# Patient Record
Sex: MALE | Race: WHITE | Employment: OTHER | URBAN - NONMETROPOLITAN AREA
[De-identification: names, ages, dates, MRNs, and addresses within clinical notes are randomized per-mention and may not be internally consistent; named-entity substitution may affect disease eponyms.]

---

## 2017-01-09 ENCOUNTER — HOSPITAL ENCOUNTER (OUTPATIENT)
Dept: CT IMAGING | Facility: MEDICAL CENTER | Age: 77
Discharge: HOME | End: 2017-01-09
Attending: INTERNAL MEDICINE | Admitting: INTERNAL MEDICINE

## 2017-01-09 DIAGNOSIS — R91.1 SOLITARY PULMONARY NODULE: ICD-10-CM

## 2017-01-09 DIAGNOSIS — R91.1 PULMONARY NODULE: ICD-10-CM

## 2017-01-11 ENCOUNTER — OFFICE VISIT (OUTPATIENT)
Dept: PULMONOLOGY | Age: 77
End: 2017-01-11

## 2017-01-11 VITALS
WEIGHT: 211 LBS | SYSTOLIC BLOOD PRESSURE: 124 MMHG | HEIGHT: 67 IN | RESPIRATION RATE: 18 BRPM | BODY MASS INDEX: 33.12 KG/M2 | OXYGEN SATURATION: 94 % | HEART RATE: 74 BPM | TEMPERATURE: 97 F | DIASTOLIC BLOOD PRESSURE: 62 MMHG

## 2017-01-11 DIAGNOSIS — R09.82 PND (POST-NASAL DRIP): ICD-10-CM

## 2017-01-11 DIAGNOSIS — R91.1 PULMONARY NODULE: Primary | ICD-10-CM

## 2017-01-11 DIAGNOSIS — J44.9 COPD, SEVERE (HCC): ICD-10-CM

## 2017-01-11 PROCEDURE — 99214 OFFICE O/P EST MOD 30 MIN: CPT | Performed by: INTERNAL MEDICINE

## 2017-01-11 RX ORDER — FLUTICASONE PROPIONATE 50 MCG
2 SPRAY, SUSPENSION (ML) NASAL DAILY
Qty: 1 BOTTLE | Refills: 11 | Status: SHIPPED | OUTPATIENT
Start: 2017-01-11 | End: 2017-05-24 | Stop reason: SDUPTHER

## 2017-01-27 ENCOUNTER — TELEPHONE (OUTPATIENT)
Dept: FAMILY MEDICINE CLINIC | Age: 77
End: 2017-01-27

## 2017-02-09 DIAGNOSIS — J43.1 PANLOBULAR EMPHYSEMA (HCC): ICD-10-CM

## 2017-02-10 RX ORDER — FLUTICASONE FUROATE AND VILANTEROL 100; 25 UG/1; UG/1
1 POWDER RESPIRATORY (INHALATION) DAILY
Qty: 30 EACH | Refills: 8 | Status: CANCELLED | OUTPATIENT
Start: 2017-02-10

## 2017-03-13 ENCOUNTER — OFFICE VISIT (OUTPATIENT)
Dept: FAMILY MEDICINE CLINIC | Age: 77
End: 2017-03-13

## 2017-03-13 VITALS
DIASTOLIC BLOOD PRESSURE: 62 MMHG | OXYGEN SATURATION: 92 % | WEIGHT: 215 LBS | HEART RATE: 61 BPM | BODY MASS INDEX: 33.74 KG/M2 | SYSTOLIC BLOOD PRESSURE: 142 MMHG | HEIGHT: 67 IN

## 2017-03-13 DIAGNOSIS — M54.2 NECK PAIN: Primary | ICD-10-CM

## 2017-03-13 DIAGNOSIS — J44.1 COPD WITH ACUTE EXACERBATION (HCC): ICD-10-CM

## 2017-03-13 PROCEDURE — 3023F SPIROM DOC REV: CPT | Performed by: FAMILY MEDICINE

## 2017-03-13 PROCEDURE — 3017F COLORECTAL CA SCREEN DOC REV: CPT | Performed by: FAMILY MEDICINE

## 2017-03-13 PROCEDURE — 4040F PNEUMOC VAC/ADMIN/RCVD: CPT | Performed by: FAMILY MEDICINE

## 2017-03-13 PROCEDURE — G8484 FLU IMMUNIZE NO ADMIN: HCPCS | Performed by: FAMILY MEDICINE

## 2017-03-13 PROCEDURE — G8417 CALC BMI ABV UP PARAM F/U: HCPCS | Performed by: FAMILY MEDICINE

## 2017-03-13 PROCEDURE — 1123F ACP DISCUSS/DSCN MKR DOCD: CPT | Performed by: FAMILY MEDICINE

## 2017-03-13 PROCEDURE — 99214 OFFICE O/P EST MOD 30 MIN: CPT | Performed by: FAMILY MEDICINE

## 2017-03-13 PROCEDURE — G8926 SPIRO NO PERF OR DOC: HCPCS | Performed by: FAMILY MEDICINE

## 2017-03-13 PROCEDURE — G8427 DOCREV CUR MEDS BY ELIG CLIN: HCPCS | Performed by: FAMILY MEDICINE

## 2017-03-13 PROCEDURE — 1036F TOBACCO NON-USER: CPT | Performed by: FAMILY MEDICINE

## 2017-03-13 RX ORDER — PREDNISONE 10 MG/1
20 TABLET ORAL 2 TIMES DAILY
Qty: 20 TABLET | Refills: 0 | Status: SHIPPED | OUTPATIENT
Start: 2017-03-13 | End: 2017-03-18

## 2017-03-13 RX ORDER — FLUTICASONE FUROATE AND VILANTEROL 100; 25 UG/1; UG/1
1 POWDER RESPIRATORY (INHALATION) DAILY
Qty: 2 EACH | Refills: 0 | COMMUNITY
Start: 2017-03-13 | End: 2017-05-24 | Stop reason: SDUPTHER

## 2017-03-13 RX ORDER — HYDROCODONE BITARTRATE AND ACETAMINOPHEN 5; 325 MG/1; MG/1
1 TABLET ORAL EVERY 6 HOURS PRN
Qty: 30 TABLET | Refills: 0 | Status: SHIPPED | OUTPATIENT
Start: 2017-03-13 | End: 2017-03-20

## 2017-03-13 ASSESSMENT — ENCOUNTER SYMPTOMS
WHEEZING: 1
COUGH: 1
SHORTNESS OF BREATH: 1

## 2017-05-24 ENCOUNTER — OFFICE VISIT (OUTPATIENT)
Dept: FAMILY MEDICINE CLINIC | Age: 77
End: 2017-05-24

## 2017-05-24 VITALS
BODY MASS INDEX: 34.37 KG/M2 | WEIGHT: 219 LBS | SYSTOLIC BLOOD PRESSURE: 134 MMHG | HEART RATE: 63 BPM | OXYGEN SATURATION: 94 % | HEIGHT: 67 IN | TEMPERATURE: 97.9 F | RESPIRATION RATE: 20 BRPM | DIASTOLIC BLOOD PRESSURE: 62 MMHG

## 2017-05-24 DIAGNOSIS — E78.5 HYPERLIPIDEMIA WITH TARGET LDL LESS THAN 130: ICD-10-CM

## 2017-05-24 DIAGNOSIS — I10 ESSENTIAL HYPERTENSION: ICD-10-CM

## 2017-05-24 DIAGNOSIS — J43.1 PANLOBULAR EMPHYSEMA (HCC): ICD-10-CM

## 2017-05-24 DIAGNOSIS — N40.1 BENIGN PROSTATIC HYPERPLASIA WITH LOWER URINARY TRACT SYMPTOMS, UNSPECIFIED MORPHOLOGY: ICD-10-CM

## 2017-05-24 DIAGNOSIS — F32.89 OTHER DEPRESSION: ICD-10-CM

## 2017-05-24 PROCEDURE — G8417 CALC BMI ABV UP PARAM F/U: HCPCS | Performed by: FAMILY MEDICINE

## 2017-05-24 PROCEDURE — G8926 SPIRO NO PERF OR DOC: HCPCS | Performed by: FAMILY MEDICINE

## 2017-05-24 PROCEDURE — G8427 DOCREV CUR MEDS BY ELIG CLIN: HCPCS | Performed by: FAMILY MEDICINE

## 2017-05-24 PROCEDURE — 4040F PNEUMOC VAC/ADMIN/RCVD: CPT | Performed by: FAMILY MEDICINE

## 2017-05-24 PROCEDURE — 1123F ACP DISCUSS/DSCN MKR DOCD: CPT | Performed by: FAMILY MEDICINE

## 2017-05-24 PROCEDURE — 3023F SPIROM DOC REV: CPT | Performed by: FAMILY MEDICINE

## 2017-05-24 PROCEDURE — 1036F TOBACCO NON-USER: CPT | Performed by: FAMILY MEDICINE

## 2017-05-24 PROCEDURE — 99214 OFFICE O/P EST MOD 30 MIN: CPT | Performed by: FAMILY MEDICINE

## 2017-05-24 PROCEDURE — 3017F COLORECTAL CA SCREEN DOC REV: CPT | Performed by: FAMILY MEDICINE

## 2017-05-24 RX ORDER — FLUOXETINE HYDROCHLORIDE 20 MG/1
CAPSULE ORAL
Qty: 30 CAPSULE | Refills: 5 | Status: SHIPPED | OUTPATIENT
Start: 2017-05-24 | End: 2017-11-10 | Stop reason: SDUPTHER

## 2017-05-24 RX ORDER — FLUTICASONE FUROATE AND VILANTEROL 100; 25 UG/1; UG/1
1 POWDER RESPIRATORY (INHALATION) DAILY
Qty: 30 EACH | Refills: 8 | Status: SHIPPED | OUTPATIENT
Start: 2017-05-24 | End: 2018-02-19 | Stop reason: SDUPTHER

## 2017-05-24 RX ORDER — ALBUTEROL SULFATE 90 UG/1
2 AEROSOL, METERED RESPIRATORY (INHALATION) EVERY 6 HOURS PRN
Qty: 3 INHALER | Refills: 5 | Status: SHIPPED | OUTPATIENT
Start: 2017-05-24 | End: 2018-01-11 | Stop reason: SDUPTHER

## 2017-05-24 RX ORDER — HYDROCHLOROTHIAZIDE 25 MG/1
25 TABLET ORAL DAILY
Qty: 30 TABLET | Refills: 5 | Status: SHIPPED | OUTPATIENT
Start: 2017-05-24 | End: 2017-11-10 | Stop reason: SDUPTHER

## 2017-05-24 RX ORDER — SIMVASTATIN 10 MG
TABLET ORAL
Qty: 30 TABLET | Refills: 5 | Status: SHIPPED | OUTPATIENT
Start: 2017-05-24 | End: 2017-11-10 | Stop reason: SDUPTHER

## 2017-05-24 RX ORDER — TAMSULOSIN HYDROCHLORIDE 0.4 MG/1
CAPSULE ORAL
Qty: 60 CAPSULE | Refills: 5 | Status: SHIPPED | OUTPATIENT
Start: 2017-05-24 | End: 2017-11-10 | Stop reason: SDUPTHER

## 2017-05-24 RX ORDER — ZOLPIDEM TARTRATE 5 MG/1
TABLET ORAL
Qty: 15 TABLET | Refills: 5 | Status: ON HOLD | OUTPATIENT
Start: 2017-05-24 | End: 2018-04-03 | Stop reason: HOSPADM

## 2017-05-24 RX ORDER — FLUTICASONE PROPIONATE 50 MCG
2 SPRAY, SUSPENSION (ML) NASAL DAILY
Qty: 1 BOTTLE | Refills: 11 | Status: SHIPPED | OUTPATIENT
Start: 2017-05-24 | End: 2018-08-13 | Stop reason: SDUPTHER

## 2017-05-24 RX ORDER — LISINOPRIL 20 MG/1
TABLET ORAL
Qty: 30 TABLET | Refills: 5 | Status: SHIPPED | OUTPATIENT
Start: 2017-05-24 | End: 2017-07-28 | Stop reason: SDUPTHER

## 2017-05-24 ASSESSMENT — PATIENT HEALTH QUESTIONNAIRE - PHQ9
2. FEELING DOWN, DEPRESSED OR HOPELESS: 0
SUM OF ALL RESPONSES TO PHQ QUESTIONS 1-9: 0
1. LITTLE INTEREST OR PLEASURE IN DOING THINGS: 0
SUM OF ALL RESPONSES TO PHQ9 QUESTIONS 1 & 2: 0

## 2017-05-24 ASSESSMENT — ENCOUNTER SYMPTOMS
GASTROINTESTINAL NEGATIVE: 1
EYES NEGATIVE: 1

## 2017-07-28 DIAGNOSIS — I10 ESSENTIAL HYPERTENSION: ICD-10-CM

## 2017-07-28 RX ORDER — LISINOPRIL 20 MG/1
TABLET ORAL
Qty: 30 TABLET | Refills: 4 | Status: SHIPPED | OUTPATIENT
Start: 2017-07-28 | End: 2017-11-10 | Stop reason: SDUPTHER

## 2017-11-10 ENCOUNTER — OFFICE VISIT (OUTPATIENT)
Dept: FAMILY MEDICINE CLINIC | Age: 77
End: 2017-11-10

## 2017-11-10 VITALS
DIASTOLIC BLOOD PRESSURE: 60 MMHG | HEIGHT: 67 IN | BODY MASS INDEX: 34.69 KG/M2 | OXYGEN SATURATION: 95 % | WEIGHT: 221 LBS | SYSTOLIC BLOOD PRESSURE: 116 MMHG | HEART RATE: 62 BPM

## 2017-11-10 DIAGNOSIS — F32.89 OTHER DEPRESSION: ICD-10-CM

## 2017-11-10 DIAGNOSIS — I10 ESSENTIAL HYPERTENSION: Primary | ICD-10-CM

## 2017-11-10 DIAGNOSIS — C34.32 MALIGNANT NEOPLASM OF LOWER LOBE OF LEFT LUNG (HCC): ICD-10-CM

## 2017-11-10 DIAGNOSIS — E78.5 HYPERLIPIDEMIA WITH TARGET LDL LESS THAN 130: ICD-10-CM

## 2017-11-10 LAB
A/G RATIO: 1.6 (ref 1.1–2.2)
ALBUMIN SERPL-MCNC: 4.4 G/DL (ref 3.4–5)
ALP BLD-CCNC: 58 U/L (ref 40–129)
ALT SERPL-CCNC: 23 U/L (ref 10–40)
ANION GAP SERPL CALCULATED.3IONS-SCNC: 12 MMOL/L (ref 3–16)
AST SERPL-CCNC: 22 U/L (ref 15–37)
BILIRUB SERPL-MCNC: 0.5 MG/DL (ref 0–1)
BUN BLDV-MCNC: 18 MG/DL (ref 7–20)
CALCIUM SERPL-MCNC: 10.7 MG/DL (ref 8.3–10.6)
CHLORIDE BLD-SCNC: 96 MMOL/L (ref 99–110)
CHOLESTEROL, TOTAL: 162 MG/DL (ref 0–199)
CO2: 33 MMOL/L (ref 21–32)
CREAT SERPL-MCNC: 0.7 MG/DL (ref 0.8–1.3)
GFR AFRICAN AMERICAN: >60
GFR NON-AFRICAN AMERICAN: >60
GLOBULIN: 2.7 G/DL
GLUCOSE BLD-MCNC: 97 MG/DL (ref 70–99)
HDLC SERPL-MCNC: 64 MG/DL (ref 40–60)
LDL CHOLESTEROL CALCULATED: 82 MG/DL
POTASSIUM SERPL-SCNC: 4.1 MMOL/L (ref 3.5–5.1)
SODIUM BLD-SCNC: 141 MMOL/L (ref 136–145)
TOTAL PROTEIN: 7.1 G/DL (ref 6.4–8.2)
TRIGL SERPL-MCNC: 78 MG/DL (ref 0–150)
VLDLC SERPL CALC-MCNC: 16 MG/DL

## 2017-11-10 PROCEDURE — G8427 DOCREV CUR MEDS BY ELIG CLIN: HCPCS | Performed by: FAMILY MEDICINE

## 2017-11-10 PROCEDURE — 4040F PNEUMOC VAC/ADMIN/RCVD: CPT | Performed by: FAMILY MEDICINE

## 2017-11-10 PROCEDURE — 1123F ACP DISCUSS/DSCN MKR DOCD: CPT | Performed by: FAMILY MEDICINE

## 2017-11-10 PROCEDURE — 3017F COLORECTAL CA SCREEN DOC REV: CPT | Performed by: FAMILY MEDICINE

## 2017-11-10 PROCEDURE — G8484 FLU IMMUNIZE NO ADMIN: HCPCS | Performed by: FAMILY MEDICINE

## 2017-11-10 PROCEDURE — 1036F TOBACCO NON-USER: CPT | Performed by: FAMILY MEDICINE

## 2017-11-10 PROCEDURE — 99214 OFFICE O/P EST MOD 30 MIN: CPT | Performed by: FAMILY MEDICINE

## 2017-11-10 PROCEDURE — G8417 CALC BMI ABV UP PARAM F/U: HCPCS | Performed by: FAMILY MEDICINE

## 2017-11-10 PROCEDURE — 36415 COLL VENOUS BLD VENIPUNCTURE: CPT | Performed by: FAMILY MEDICINE

## 2017-11-10 RX ORDER — TAMSULOSIN HYDROCHLORIDE 0.4 MG/1
CAPSULE ORAL
Qty: 60 CAPSULE | Refills: 5 | Status: SHIPPED | OUTPATIENT
Start: 2017-11-10 | End: 2018-02-27 | Stop reason: DRUGHIGH

## 2017-11-10 RX ORDER — HYDROCHLOROTHIAZIDE 25 MG/1
25 TABLET ORAL DAILY
Qty: 30 TABLET | Refills: 5 | Status: SHIPPED | OUTPATIENT
Start: 2017-11-10 | End: 2018-06-01 | Stop reason: ALTCHOICE

## 2017-11-10 RX ORDER — FINASTERIDE 5 MG/1
5 TABLET, FILM COATED ORAL DAILY
Status: ON HOLD | COMMUNITY
Start: 2017-08-22 | End: 2018-04-03 | Stop reason: HOSPADM

## 2017-11-10 RX ORDER — FLUOXETINE HYDROCHLORIDE 20 MG/1
CAPSULE ORAL
Qty: 30 CAPSULE | Refills: 5 | Status: ON HOLD | OUTPATIENT
Start: 2017-11-10 | End: 2018-04-03 | Stop reason: HOSPADM

## 2017-11-10 RX ORDER — SIMVASTATIN 10 MG
TABLET ORAL
Qty: 30 TABLET | Refills: 5 | Status: SHIPPED | OUTPATIENT
Start: 2017-11-10 | End: 2018-10-01 | Stop reason: SDUPTHER

## 2017-11-10 RX ORDER — FLUTICASONE FUROATE AND VILANTEROL 100; 25 UG/1; UG/1
POWDER RESPIRATORY (INHALATION)
Qty: 1 EACH | Refills: 0 | COMMUNITY
Start: 2017-11-10 | End: 2018-02-19 | Stop reason: SDUPTHER

## 2017-11-10 RX ORDER — LISINOPRIL 20 MG/1
TABLET ORAL
Qty: 30 TABLET | Refills: 5 | Status: SHIPPED | OUTPATIENT
Start: 2017-11-10 | End: 2018-06-01 | Stop reason: DRUGHIGH

## 2017-11-10 NOTE — PROGRESS NOTES
TABLET DAILY  -     Comprehensive Metabolic Panel    Other depression  -     FLUoxetine (PROZAC) 20 MG capsule; TAKE 1 CAPSULE ONCE DAILY    Hyperlipidemia with target LDL less than 130  -     simvastatin (ZOCOR) 10 MG tablet; TAKE ONE TABLET BY MOUTH EVERY EVENING  -     Lipid Panel  -     Comprehensive Metabolic Panel    Malignant neoplasm of lower lobe of left lung (HCC)    Other orders  -     hydrochlorothiazide (HYDRODIURIL) 25 MG tablet; Take 1 tablet by mouth daily  -     tamsulosin (FLOMAX) 0.4 MG capsule; TAKE 2 CAPSULES AT BEDTIME  -     Umeclidinium Bromide (INCRUSE ELLIPTA) 62.5 MCG/INH AEPB; LOT [id number]  EXP 08/2018  2 samples were given  -     fluticasone-vilanterol (BREO ELLIPTA) 100-25 MCG/INH AEPB inhaler; LOT [id number]  EXP 11/2018  2 samples were given           Plan:      Return in about 6 months (around 5/10/2018). There are no Patient Instructions on file for this visit.

## 2017-11-10 NOTE — LETTER
[place]  [facility] [address] [place] [address]  Phone: [phone]  Fax: [phone]    Светлана Singleton MD        November 10, 2017     Patient: Lizbeth Gamez   YOB: 1940   Date of Visit: 11/10/2017       To Whom It May Concern: It is my medical opinion that Ines Lan requires a disability parking placard for the following reasons:  He cannot walk 200 feet without stopping to rest.  Duration of need: permanent    If you have any questions or concerns, please don't hesitate to call.     Sincerely,          Светлана Singleton MD

## 2018-01-02 RX ORDER — FLUTICASONE FUROATE AND VILANTEROL TRIFENATATE 100; 25 UG/1; UG/1
POWDER RESPIRATORY (INHALATION)
Qty: 1 EACH | Refills: 5 | Status: SHIPPED | OUTPATIENT
Start: 2018-01-02 | End: 2018-10-01 | Stop reason: SDUPTHER

## 2018-01-09 ENCOUNTER — HOSPITAL ENCOUNTER (OUTPATIENT)
Dept: CT IMAGING | Facility: MEDICAL CENTER | Age: 78
Discharge: HOME | End: 2018-01-09
Attending: INTERNAL MEDICINE | Admitting: INTERNAL MEDICINE

## 2018-01-09 DIAGNOSIS — R91.1 PULMONARY NODULE: ICD-10-CM

## 2018-01-09 DIAGNOSIS — C80.1 PRIMARY MALIGNANT NEOPLASM (HCC): ICD-10-CM

## 2018-01-11 ENCOUNTER — OFFICE VISIT (OUTPATIENT)
Dept: PULMONOLOGY | Age: 78
End: 2018-01-11

## 2018-01-11 VITALS
OXYGEN SATURATION: 91 % | RESPIRATION RATE: 18 BRPM | WEIGHT: 226 LBS | HEIGHT: 67 IN | BODY MASS INDEX: 35.47 KG/M2 | HEART RATE: 72 BPM | SYSTOLIC BLOOD PRESSURE: 122 MMHG | DIASTOLIC BLOOD PRESSURE: 76 MMHG | TEMPERATURE: 97.8 F

## 2018-01-11 DIAGNOSIS — J44.9 STAGE 3 SEVERE COPD BY GOLD CLASSIFICATION (HCC): ICD-10-CM

## 2018-01-11 DIAGNOSIS — R91.1 PULMONARY NODULE: Primary | ICD-10-CM

## 2018-01-11 PROCEDURE — G8484 FLU IMMUNIZE NO ADMIN: HCPCS | Performed by: INTERNAL MEDICINE

## 2018-01-11 PROCEDURE — 3017F COLORECTAL CA SCREEN DOC REV: CPT | Performed by: INTERNAL MEDICINE

## 2018-01-11 PROCEDURE — 1123F ACP DISCUSS/DSCN MKR DOCD: CPT | Performed by: INTERNAL MEDICINE

## 2018-01-11 PROCEDURE — G8417 CALC BMI ABV UP PARAM F/U: HCPCS | Performed by: INTERNAL MEDICINE

## 2018-01-11 PROCEDURE — 4040F PNEUMOC VAC/ADMIN/RCVD: CPT | Performed by: INTERNAL MEDICINE

## 2018-01-11 PROCEDURE — 3023F SPIROM DOC REV: CPT | Performed by: INTERNAL MEDICINE

## 2018-01-11 PROCEDURE — 1036F TOBACCO NON-USER: CPT | Performed by: INTERNAL MEDICINE

## 2018-01-11 PROCEDURE — G8926 SPIRO NO PERF OR DOC: HCPCS | Performed by: INTERNAL MEDICINE

## 2018-01-11 PROCEDURE — 99214 OFFICE O/P EST MOD 30 MIN: CPT | Performed by: INTERNAL MEDICINE

## 2018-01-11 PROCEDURE — G8427 DOCREV CUR MEDS BY ELIG CLIN: HCPCS | Performed by: INTERNAL MEDICINE

## 2018-01-11 RX ORDER — ALBUTEROL SULFATE 90 UG/1
2 AEROSOL, METERED RESPIRATORY (INHALATION) EVERY 6 HOURS PRN
Qty: 1 INHALER | Refills: 5 | Status: SHIPPED | OUTPATIENT
Start: 2018-01-11 | End: 2019-02-21 | Stop reason: SDUPTHER

## 2018-01-11 NOTE — PATIENT INSTRUCTIONS
Please keep all of your future appointments scheduled by [facility] Naren MCCLELLAN [facility] Pulmonary office.

## 2018-02-19 ENCOUNTER — OFFICE VISIT (OUTPATIENT)
Dept: FAMILY MEDICINE CLINIC | Age: 78
End: 2018-02-19

## 2018-02-19 VITALS
BODY MASS INDEX: 35.4 KG/M2 | TEMPERATURE: 97.7 F | HEART RATE: 59 BPM | SYSTOLIC BLOOD PRESSURE: 129 MMHG | WEIGHT: 226 LBS | OXYGEN SATURATION: 94 % | DIASTOLIC BLOOD PRESSURE: 66 MMHG

## 2018-02-19 DIAGNOSIS — K59.00 CONSTIPATION, UNSPECIFIED CONSTIPATION TYPE: ICD-10-CM

## 2018-02-19 DIAGNOSIS — R10.30 LOWER ABDOMINAL PAIN: Primary | ICD-10-CM

## 2018-02-19 LAB
ALBUMIN SERPL-MCNC: 4.2 G/DL (ref 3.4–5)
ANION GAP SERPL CALCULATED.3IONS-SCNC: 17 MMOL/L (ref 3–16)
BASOPHILS ABSOLUTE: 0 K/UL (ref 0–0.2)
BASOPHILS RELATIVE PERCENT: 0.2 %
BUN BLDV-MCNC: 23 MG/DL (ref 7–20)
CALCIUM SERPL-MCNC: 9.4 MG/DL (ref 8.3–10.6)
CHLORIDE BLD-SCNC: 94 MMOL/L (ref 99–110)
CO2: 27 MMOL/L (ref 21–32)
CREAT SERPL-MCNC: 1.6 MG/DL (ref 0.8–1.3)
EOSINOPHILS ABSOLUTE: 0.1 K/UL (ref 0–0.6)
EOSINOPHILS RELATIVE PERCENT: 1 %
GFR AFRICAN AMERICAN: 51
GFR NON-AFRICAN AMERICAN: 42
GLUCOSE BLD-MCNC: 107 MG/DL (ref 70–99)
HCT VFR BLD CALC: 41.5 % (ref 40.5–52.5)
HEMOGLOBIN: 14 G/DL (ref 13.5–17.5)
LYMPHOCYTES ABSOLUTE: 1.1 K/UL (ref 1–5.1)
LYMPHOCYTES RELATIVE PERCENT: 12.6 %
MCH RBC QN AUTO: 31 PG (ref 26–34)
MCHC RBC AUTO-ENTMCNC: 33.7 G/DL (ref 31–36)
MCV RBC AUTO: 92 FL (ref 80–100)
MONOCYTES ABSOLUTE: 0.8 K/UL (ref 0–1.3)
MONOCYTES RELATIVE PERCENT: 8.5 %
NEUTROPHILS ABSOLUTE: 6.9 K/UL (ref 1.7–7.7)
NEUTROPHILS RELATIVE PERCENT: 77.7 %
PDW BLD-RTO: 14.3 % (ref 12.4–15.4)
PHOSPHORUS: 2.9 MG/DL (ref 2.5–4.9)
PLATELET # BLD: 198 K/UL (ref 135–450)
PMV BLD AUTO: 8.1 FL (ref 5–10.5)
POTASSIUM SERPL-SCNC: 3.5 MMOL/L (ref 3.5–5.1)
RBC # BLD: 4.51 M/UL (ref 4.2–5.9)
SODIUM BLD-SCNC: 138 MMOL/L (ref 136–145)
WBC # BLD: 8.9 K/UL (ref 4–11)

## 2018-02-19 PROCEDURE — G8417 CALC BMI ABV UP PARAM F/U: HCPCS | Performed by: FAMILY MEDICINE

## 2018-02-19 PROCEDURE — 36415 COLL VENOUS BLD VENIPUNCTURE: CPT | Performed by: FAMILY MEDICINE

## 2018-02-19 PROCEDURE — 3017F COLORECTAL CA SCREEN DOC REV: CPT | Performed by: FAMILY MEDICINE

## 2018-02-19 PROCEDURE — G8484 FLU IMMUNIZE NO ADMIN: HCPCS | Performed by: FAMILY MEDICINE

## 2018-02-19 PROCEDURE — 4040F PNEUMOC VAC/ADMIN/RCVD: CPT | Performed by: FAMILY MEDICINE

## 2018-02-19 PROCEDURE — 1123F ACP DISCUSS/DSCN MKR DOCD: CPT | Performed by: FAMILY MEDICINE

## 2018-02-19 PROCEDURE — 1036F TOBACCO NON-USER: CPT | Performed by: FAMILY MEDICINE

## 2018-02-19 PROCEDURE — 99214 OFFICE O/P EST MOD 30 MIN: CPT | Performed by: FAMILY MEDICINE

## 2018-02-19 PROCEDURE — G8427 DOCREV CUR MEDS BY ELIG CLIN: HCPCS | Performed by: FAMILY MEDICINE

## 2018-02-19 RX ORDER — LACTULOSE 10 G/15ML
10 SOLUTION ORAL EVERY EVENING
Qty: 240 ML | Refills: 0 | Status: SHIPPED | OUTPATIENT
Start: 2018-02-19

## 2018-02-19 NOTE — PROGRESS NOTES
regular rhythm and normal heart sounds. Pulmonary/Chest: Effort normal. He has wheezes (bilateral). He has no rales. He exhibits no tenderness. Bilateral diminished breath sounds   Abdominal: Soft. He exhibits no mass. There is no hepatosplenomegaly. There is no tenderness (patient states he would've been tender yesterday). No hernia. Musculoskeletal: He exhibits no edema. Neurological: He is alert. Skin: No cyanosis. No pallor. Nails show no clubbing. Psychiatric: He has a normal mood and affect. Nursing note and vitals reviewed. /66 (Site: Left Arm, Position: Sitting, Cuff Size: Medium Adult)   Pulse 59   Temp 97.7 °F (36.5 °C) (Oral)   Wt 226 lb (102.5 kg)   SpO2 94% Comment: room air  BMI 35.40 kg/m²       Assessment:      Cara Sevilla was seen today for abdominal pain, lower back pain and constipation. Diagnoses and all orders for this visit:    Lower abdominal pain  -     CBC Auto Differential  -     Renal Function Panel    Constipation, unspecified constipation type  -     lactulose (CHRONULAC) 10 GM/15ML solution; Take 15 mLs by mouth every evening           Plan:        Patient Instructions   May increase lactulose to one tablespoon twice a day if no result. May decrease to every other day if too loose    If more difficulties,please call.

## 2018-02-20 ENCOUNTER — NURSE ONLY (OUTPATIENT)
Dept: FAMILY MEDICINE CLINIC | Age: 78
End: 2018-02-20

## 2018-02-20 ENCOUNTER — TELEPHONE (OUTPATIENT)
Dept: FAMILY MEDICINE CLINIC | Age: 78
End: 2018-02-20

## 2018-02-20 DIAGNOSIS — R10.9 ABDOMINAL PAIN, UNSPECIFIED ABDOMINAL LOCATION: Primary | ICD-10-CM

## 2018-02-20 LAB
BILIRUBIN URINE: NEGATIVE
BILIRUBIN, POC: ABNORMAL
BLOOD URINE, POC: ABNORMAL
BLOOD, URINE: NEGATIVE
CLARITY, POC: CLEAR
CLARITY: CLEAR
COLOR, POC: YELLOW
COLOR: YELLOW
COMMENT UA: ABNORMAL
EPITHELIAL CELLS, UA: 6 /HPF (ref 0–5)
GLUCOSE URINE, POC: ABNORMAL
GLUCOSE URINE: NEGATIVE MG/DL
HYALINE CASTS: 1 /LPF (ref 0–8)
KETONES, POC: ABNORMAL
KETONES, URINE: NEGATIVE MG/DL
LEUKOCYTE EST, POC: ABNORMAL
LEUKOCYTE ESTERASE, URINE: ABNORMAL
MICROSCOPIC EXAMINATION: YES
NITRITE, POC: ABNORMAL
NITRITE, URINE: NEGATIVE
PH UA: 6
PH, POC: 5.5
PROTEIN UA: ABNORMAL MG/DL
PROTEIN, POC: ABNORMAL
RBC UA: 2 /HPF (ref 0–4)
SPECIFIC GRAVITY UA: 1.02
SPECIFIC GRAVITY, POC: 1.01
UROBILINOGEN, POC: 0.2
UROBILINOGEN, URINE: 0.2 E.U./DL
WBC UA: 8 /HPF (ref 0–5)

## 2018-02-20 PROCEDURE — 81002 URINALYSIS NONAUTO W/O SCOPE: CPT | Performed by: FAMILY MEDICINE

## 2018-02-20 ASSESSMENT — ENCOUNTER SYMPTOMS
BLOOD IN STOOL: 0
SHORTNESS OF BREATH: 1
RECTAL PAIN: 0
DIARRHEA: 0
ANAL BLEEDING: 0
ABDOMINAL PAIN: 1
ABDOMINAL DISTENTION: 1
CONSTIPATION: 1
NAUSEA: 0
VOMITING: 0

## 2018-02-21 ENCOUNTER — TELEPHONE (OUTPATIENT)
Dept: FAMILY MEDICINE CLINIC | Age: 78
End: 2018-02-21

## 2018-02-21 NOTE — TELEPHONE ENCOUNTER
Patient relates he still has not had a bowel movement even though he took the medicine. He is passing gas, but no stool. Patient can not remember when he had a good bowel movement.

## 2018-02-22 LAB — URINE CULTURE, ROUTINE: NORMAL

## 2018-02-27 ENCOUNTER — OFFICE VISIT (OUTPATIENT)
Dept: FAMILY MEDICINE CLINIC | Age: 78
End: 2018-02-27

## 2018-02-27 ENCOUNTER — HOSPITAL ENCOUNTER (OUTPATIENT)
Dept: OTHER | Age: 78
Discharge: HOME | End: 2018-02-27
Attending: FAMILY MEDICINE | Admitting: FAMILY MEDICINE

## 2018-02-27 VITALS
HEART RATE: 69 BPM | TEMPERATURE: 98.2 F | OXYGEN SATURATION: 94 % | BODY MASS INDEX: 36.02 KG/M2 | SYSTOLIC BLOOD PRESSURE: 138 MMHG | WEIGHT: 230 LBS | DIASTOLIC BLOOD PRESSURE: 83 MMHG

## 2018-02-27 DIAGNOSIS — E86.0 DEHYDRATION: Primary | ICD-10-CM

## 2018-02-27 DIAGNOSIS — M54.50 CHRONIC MIDLINE LOW BACK PAIN WITHOUT SCIATICA: ICD-10-CM

## 2018-02-27 DIAGNOSIS — R10.9 LEFT FLANK PAIN: ICD-10-CM

## 2018-02-27 DIAGNOSIS — G89.29 CHRONIC MIDLINE LOW BACK PAIN WITHOUT SCIATICA: ICD-10-CM

## 2018-02-27 DIAGNOSIS — K59.00 CONSTIPATION, UNSPECIFIED CONSTIPATION TYPE: ICD-10-CM

## 2018-02-27 PROCEDURE — 36415 COLL VENOUS BLD VENIPUNCTURE: CPT | Performed by: FAMILY MEDICINE

## 2018-02-27 PROCEDURE — G8417 CALC BMI ABV UP PARAM F/U: HCPCS | Performed by: FAMILY MEDICINE

## 2018-02-27 PROCEDURE — 3017F COLORECTAL CA SCREEN DOC REV: CPT | Performed by: FAMILY MEDICINE

## 2018-02-27 PROCEDURE — 1036F TOBACCO NON-USER: CPT | Performed by: FAMILY MEDICINE

## 2018-02-27 PROCEDURE — G8427 DOCREV CUR MEDS BY ELIG CLIN: HCPCS | Performed by: FAMILY MEDICINE

## 2018-02-27 PROCEDURE — 99214 OFFICE O/P EST MOD 30 MIN: CPT | Performed by: FAMILY MEDICINE

## 2018-02-27 PROCEDURE — G8484 FLU IMMUNIZE NO ADMIN: HCPCS | Performed by: FAMILY MEDICINE

## 2018-02-27 PROCEDURE — 4040F PNEUMOC VAC/ADMIN/RCVD: CPT | Performed by: FAMILY MEDICINE

## 2018-02-27 PROCEDURE — 1123F ACP DISCUSS/DSCN MKR DOCD: CPT | Performed by: FAMILY MEDICINE

## 2018-02-27 RX ORDER — TAMSULOSIN HYDROCHLORIDE 0.4 MG/1
CAPSULE ORAL
Qty: 30 CAPSULE | Refills: 5 | Status: SHIPPED
Start: 2018-02-27 | End: 2018-07-26 | Stop reason: SDUPTHER

## 2018-02-27 RX ORDER — MELOXICAM 15 MG/1
15 TABLET ORAL DAILY
Qty: 15 TABLET | Refills: 0 | Status: ON HOLD | OUTPATIENT
Start: 2018-02-27 | End: 2018-03-13 | Stop reason: HOSPADM

## 2018-02-27 ASSESSMENT — ENCOUNTER SYMPTOMS
BLOOD IN STOOL: 0
ABDOMINAL DISTENTION: 1
BACK PAIN: 1
ABDOMINAL PAIN: 0
RECTAL PAIN: 0
SHORTNESS OF BREATH: 1
CONSTIPATION: 1

## 2018-02-27 NOTE — PROGRESS NOTES
Subjective:      Patient ID: Wing Jiang is a 78 y.o. male. HPI  Chief Complaint   Patient presents with    Follow-up     1 week follow up stopped HCTZ     Lower Back Pain     follow up in one week    Abdominal Cramping     lactulose (CHRONULAC) 10 GM/15ML solution      Chief complaint and present illness: 44-year-old white male presents in follow-up to recent visit when he came in related to constipation. Renal panel at that time revealed an elevated creatinine.  of diuretic was stopped and is here in follow-up. With regard to the constipation, improved on lactulose twice a day but then he stopped using it and now he is constipated again. He also had some crampy pain this morning after using lactulose last night. On 70 did have a bowel movement which was satisfactory in size but somewhat hard. Patient feels slightly bloated. With regard to his renal dysfunction, recent states that he's been taking 5 tablets of 200 mg each ibuprofen 5 times a day. He did not disclose this on the last visit. Moreover he is also taking some other over-the-counter medicine which she did not bring with him for his pain. The pain is he is speaking about is his chronic lower mid lumbar pain but also new left-sided flank pain mild in nature which started with this entire illness. Patient also has known kidney stones. Discussion held RE too much NSAIDs. Review of Systems   Constitutional: Positive for activity change. Negative for appetite change and fever. Respiratory: Positive for shortness of breath (no change). Cardiovascular: Negative. Gastrointestinal: Positive for abdominal distention and constipation. Negative for abdominal pain, blood in stool and rectal pain. Genitourinary: Negative. Musculoskeletal: Positive for back pain. Negative for gait problem.        Objective:   Physical Exam   Constitutional:   Elderly alert white male who does not feel well   Cardiovascular: Normal rate and

## 2018-02-28 ENCOUNTER — TELEPHONE (OUTPATIENT)
Dept: FAMILY MEDICINE CLINIC | Age: 78
End: 2018-02-28

## 2018-02-28 ENCOUNTER — HOSPITAL ENCOUNTER (OUTPATIENT)
Dept: CT IMAGING | Facility: MEDICAL CENTER | Age: 78
Discharge: HOME | End: 2018-02-28
Attending: FAMILY MEDICINE | Admitting: FAMILY MEDICINE

## 2018-02-28 DIAGNOSIS — M54.50 LOW BACK PAIN: ICD-10-CM

## 2018-02-28 DIAGNOSIS — K59.00 CONSTIPATION, UNSPECIFIED CONSTIPATION TYPE: ICD-10-CM

## 2018-02-28 DIAGNOSIS — R10.9 FLANK PAIN: ICD-10-CM

## 2018-02-28 DIAGNOSIS — R94.4 KIDNEY FUNCTION TEST ABNORMAL: Primary | ICD-10-CM

## 2018-02-28 DIAGNOSIS — N20.0 KIDNEY STONE ON LEFT SIDE: ICD-10-CM

## 2018-02-28 DIAGNOSIS — M54.50 LOW BACK PAIN WITHOUT SCIATICA, UNSPECIFIED BACK PAIN LATERALITY, UNSPECIFIED CHRONICITY: Primary | ICD-10-CM

## 2018-02-28 DIAGNOSIS — M54.50 LOW BACK PAIN WITHOUT SCIATICA, UNSPECIFIED BACK PAIN LATERALITY, UNSPECIFIED CHRONICITY: ICD-10-CM

## 2018-02-28 LAB
A/G RATIO: 1.4 (ref 1.1–2.2)
ALBUMIN SERPL-MCNC: 3.7 G/DL (ref 3.4–5)
ALP BLD-CCNC: 51 U/L (ref 40–129)
ALT SERPL-CCNC: 17 U/L (ref 10–40)
ANION GAP SERPL CALCULATED.3IONS-SCNC: 14 MMOL/L (ref 3–16)
AST SERPL-CCNC: 19 U/L (ref 15–37)
BILIRUB SERPL-MCNC: <0.2 MG/DL (ref 0–1)
BUN BLDV-MCNC: 28 MG/DL (ref 7–20)
CALCIUM SERPL-MCNC: 8.7 MG/DL (ref 8.3–10.6)
CHLORIDE BLD-SCNC: 101 MMOL/L (ref 99–110)
CO2: 28 MMOL/L (ref 21–32)
CREAT SERPL-MCNC: 1.5 MG/DL (ref 0.8–1.3)
GFR AFRICAN AMERICAN: 55
GFR NON-AFRICAN AMERICAN: 46
GLOBULIN: 2.6 G/DL
GLUCOSE BLD-MCNC: 148 MG/DL (ref 70–99)
PHOSPHORUS: 2.2 MG/DL (ref 2.5–4.9)
POTASSIUM SERPL-SCNC: 4 MMOL/L (ref 3.5–5.1)
SODIUM BLD-SCNC: 143 MMOL/L (ref 136–145)
TOTAL PROTEIN: 6.3 G/DL (ref 6.4–8.2)

## 2018-03-06 ENCOUNTER — OFFICE VISIT (OUTPATIENT)
Dept: FAMILY MEDICINE CLINIC | Age: 78
End: 2018-03-06

## 2018-03-06 DIAGNOSIS — I10 ESSENTIAL HYPERTENSION: Primary | ICD-10-CM

## 2018-03-06 DIAGNOSIS — N18.30 STAGE 3 CHRONIC KIDNEY DISEASE (HCC): ICD-10-CM

## 2018-03-06 PROCEDURE — G8427 DOCREV CUR MEDS BY ELIG CLIN: HCPCS | Performed by: FAMILY MEDICINE

## 2018-03-06 PROCEDURE — G8417 CALC BMI ABV UP PARAM F/U: HCPCS | Performed by: FAMILY MEDICINE

## 2018-03-06 PROCEDURE — 1123F ACP DISCUSS/DSCN MKR DOCD: CPT | Performed by: FAMILY MEDICINE

## 2018-03-06 PROCEDURE — 1036F TOBACCO NON-USER: CPT | Performed by: FAMILY MEDICINE

## 2018-03-06 PROCEDURE — 3017F COLORECTAL CA SCREEN DOC REV: CPT | Performed by: FAMILY MEDICINE

## 2018-03-06 PROCEDURE — G8482 FLU IMMUNIZE ORDER/ADMIN: HCPCS | Performed by: FAMILY MEDICINE

## 2018-03-06 PROCEDURE — 36415 COLL VENOUS BLD VENIPUNCTURE: CPT | Performed by: FAMILY MEDICINE

## 2018-03-06 PROCEDURE — 4040F PNEUMOC VAC/ADMIN/RCVD: CPT | Performed by: FAMILY MEDICINE

## 2018-03-06 PROCEDURE — 99213 OFFICE O/P EST LOW 20 MIN: CPT | Performed by: FAMILY MEDICINE

## 2018-03-06 PROCEDURE — G8598 ASA/ANTIPLAT THER USED: HCPCS | Performed by: FAMILY MEDICINE

## 2018-03-06 NOTE — PROGRESS NOTES
Subjective:      Patient ID: Nita Machado is a 78 y.o. male. HPI  Chief Complaint   Patient presents with    Back Pain     1 week follow up left flank pain (kidney stone)      Chief complaint and present illness: 82-year-old white male presents in follow-up after recent series of investigations and office visits concerning abdominal pain, constipation and nephrolithiasis. Patient's abdominal pain and constipation are gone. A subsequent issue was his overuse of NSAIDs and resulting renal impairment. Review of Systems   Constitutional: Negative. Respiratory:        No change   Cardiovascular: Negative. Gastrointestinal: Negative. Neurological: Negative. Objective:   Physical Exam   Constitutional: He appears well-developed and well-nourished. No distress. Cardiovascular: Normal rate, regular rhythm and normal heart sounds. Pulmonary/Chest: Effort normal.   Decreased breath sounds on left per usual given his lung resection; otherwise clear   Nursing note and vitals reviewed. BP (!) 170/70 Comment: Off hydrochlorothiazide  Pulse 58   Temp 98.3 °F (36.8 °C) (Oral)   Resp 15   Wt 232 lb (105.2 kg)   SpO2 94%   BMI 36.34 kg/m²     Assessment:      Margo Sarabia was seen today for back pain. Diagnoses and all orders for this visit:    Essential hypertension  -     Renal Function Panel    Stage 3 chronic kidney disease  -     Renal Function Panel           Plan:        Patient Instructions   Once the blood work returns,we will decide on follow-up and what to prescribe for the blood pressure.

## 2018-03-07 LAB
ALBUMIN SERPL-MCNC: 3.6 G/DL (ref 3.4–5)
ANION GAP SERPL CALCULATED.3IONS-SCNC: 11 MMOL/L (ref 3–16)
BUN BLDV-MCNC: 17 MG/DL (ref 7–20)
CALCIUM SERPL-MCNC: 9.5 MG/DL (ref 8.3–10.6)
CHLORIDE BLD-SCNC: 100 MMOL/L (ref 99–110)
CO2: 33 MMOL/L (ref 21–32)
CREAT SERPL-MCNC: 0.9 MG/DL (ref 0.8–1.3)
GFR AFRICAN AMERICAN: >60
GFR NON-AFRICAN AMERICAN: >60
GLUCOSE BLD-MCNC: 144 MG/DL (ref 70–99)
PHOSPHORUS: 2.7 MG/DL (ref 2.5–4.9)
POTASSIUM SERPL-SCNC: 4 MMOL/L (ref 3.5–5.1)
SODIUM BLD-SCNC: 144 MMOL/L (ref 136–145)

## 2018-03-09 PROBLEM — I62.9 INTRACRANIAL HEMORRHAGE (HCC): Status: ACTIVE | Noted: 2018-03-09

## 2018-03-11 VITALS
OXYGEN SATURATION: 94 % | HEART RATE: 58 BPM | SYSTOLIC BLOOD PRESSURE: 170 MMHG | DIASTOLIC BLOOD PRESSURE: 70 MMHG | BODY MASS INDEX: 36.34 KG/M2 | TEMPERATURE: 98.3 F | WEIGHT: 232 LBS | RESPIRATION RATE: 15 BRPM

## 2018-03-11 ASSESSMENT — ENCOUNTER SYMPTOMS: GASTROINTESTINAL NEGATIVE: 1

## 2018-03-13 PROBLEM — I62.9: Status: ACTIVE | Noted: 2018-03-13

## 2018-03-29 ENCOUNTER — TELEPHONE (OUTPATIENT)
Dept: FAMILY MEDICINE CLINIC | Age: 78
End: 2018-03-29

## 2018-05-14 DIAGNOSIS — Z12.11 SCREENING FOR COLON CANCER: Primary | ICD-10-CM

## 2018-05-30 ENCOUNTER — TELEPHONE (OUTPATIENT)
Dept: FAMILY MEDICINE CLINIC | Age: 78
End: 2018-05-30

## 2018-05-30 DIAGNOSIS — R33.9 URINARY RETENTION: Primary | ICD-10-CM

## 2018-06-01 ENCOUNTER — OFFICE VISIT (OUTPATIENT)
Dept: FAMILY MEDICINE CLINIC | Age: 78
End: 2018-06-01

## 2018-06-01 ENCOUNTER — TELEPHONE (OUTPATIENT)
Dept: FAMILY MEDICINE CLINIC | Age: 78
End: 2018-06-01

## 2018-06-01 VITALS
DIASTOLIC BLOOD PRESSURE: 78 MMHG | OXYGEN SATURATION: 90 % | HEART RATE: 106 BPM | WEIGHT: 186 LBS | SYSTOLIC BLOOD PRESSURE: 124 MMHG | BODY MASS INDEX: 29.13 KG/M2 | TEMPERATURE: 98.6 F

## 2018-06-01 DIAGNOSIS — R12 HEARTBURN: ICD-10-CM

## 2018-06-01 DIAGNOSIS — I62.9 INTRACRANIAL HEMORRHAGE (HCC): Primary | ICD-10-CM

## 2018-06-01 DIAGNOSIS — I10 ESSENTIAL HYPERTENSION: ICD-10-CM

## 2018-06-01 PROCEDURE — 99495 TRANSJ CARE MGMT MOD F2F 14D: CPT | Performed by: FAMILY MEDICINE

## 2018-06-01 PROCEDURE — 1111F DSCHRG MED/CURRENT MED MERGE: CPT | Performed by: FAMILY MEDICINE

## 2018-06-01 RX ORDER — FINASTERIDE 5 MG/1
5 TABLET, FILM COATED ORAL DAILY
COMMUNITY
End: 2019-03-04 | Stop reason: SDUPTHER

## 2018-06-01 RX ORDER — LISINOPRIL 10 MG/1
TABLET ORAL
Qty: 90 TABLET | Refills: 1 | Status: SHIPPED
Start: 2018-06-01 | End: 2018-09-13 | Stop reason: SDUPTHER

## 2018-06-01 RX ORDER — POLYETHYLENE GLYCOL 3350 17 G/17G
17 POWDER, FOR SOLUTION ORAL DAILY
COMMUNITY

## 2018-06-01 RX ORDER — PANTOPRAZOLE SODIUM 40 MG/1
40 TABLET, DELAYED RELEASE ORAL DAILY
Qty: 30 TABLET | Refills: 3 | Status: SHIPPED | OUTPATIENT
Start: 2018-06-01 | End: 2018-09-24 | Stop reason: SDUPTHER

## 2018-06-01 RX ORDER — HYDROCHLOROTHIAZIDE 25 MG/1
25 TABLET ORAL DAILY
COMMUNITY
End: 2018-09-27 | Stop reason: SDUPTHER

## 2018-06-01 ASSESSMENT — PATIENT HEALTH QUESTIONNAIRE - PHQ9
2. FEELING DOWN, DEPRESSED OR HOPELESS: 1
SUM OF ALL RESPONSES TO PHQ9 QUESTIONS 1 & 2: 2
1. LITTLE INTEREST OR PLEASURE IN DOING THINGS: 1
SUM OF ALL RESPONSES TO PHQ QUESTIONS 1-9: 2

## 2018-06-04 ASSESSMENT — ENCOUNTER SYMPTOMS
GASTROINTESTINAL NEGATIVE: 1
WHEEZING: 1
COUGH: 1

## 2018-06-08 ENCOUNTER — TELEPHONE (OUTPATIENT)
Dept: FAMILY MEDICINE CLINIC | Age: 78
End: 2018-06-08

## 2018-07-02 ENCOUNTER — OFFICE VISIT (OUTPATIENT)
Dept: FAMILY MEDICINE CLINIC | Age: 78
End: 2018-07-02

## 2018-07-02 DIAGNOSIS — J43.1 PANLOBULAR EMPHYSEMA (HCC): Primary | ICD-10-CM

## 2018-07-02 DIAGNOSIS — I48.0 PAROXYSMAL ATRIAL FIBRILLATION (HCC): ICD-10-CM

## 2018-07-02 DIAGNOSIS — I62.9 INTRACRANIAL HEMORRHAGE (HCC): ICD-10-CM

## 2018-07-02 PROCEDURE — G8598 ASA/ANTIPLAT THER USED: HCPCS | Performed by: FAMILY MEDICINE

## 2018-07-02 PROCEDURE — 3023F SPIROM DOC REV: CPT | Performed by: FAMILY MEDICINE

## 2018-07-02 PROCEDURE — 99214 OFFICE O/P EST MOD 30 MIN: CPT | Performed by: FAMILY MEDICINE

## 2018-07-02 PROCEDURE — 4040F PNEUMOC VAC/ADMIN/RCVD: CPT | Performed by: FAMILY MEDICINE

## 2018-07-02 PROCEDURE — G8417 CALC BMI ABV UP PARAM F/U: HCPCS | Performed by: FAMILY MEDICINE

## 2018-07-02 PROCEDURE — 1036F TOBACCO NON-USER: CPT | Performed by: FAMILY MEDICINE

## 2018-07-02 PROCEDURE — 3017F COLORECTAL CA SCREEN DOC REV: CPT | Performed by: FAMILY MEDICINE

## 2018-07-02 PROCEDURE — 1123F ACP DISCUSS/DSCN MKR DOCD: CPT | Performed by: FAMILY MEDICINE

## 2018-07-02 PROCEDURE — G8926 SPIRO NO PERF OR DOC: HCPCS | Performed by: FAMILY MEDICINE

## 2018-07-02 PROCEDURE — G8427 DOCREV CUR MEDS BY ELIG CLIN: HCPCS | Performed by: FAMILY MEDICINE

## 2018-07-02 NOTE — PROGRESS NOTES
Subjective:      Patient ID: Sagrario Gonzales is a 78 y.o. male. HPI  Chief Complaint   Patient presents with    Follow-up     4 week f/u     COPD     on 2l;never before;seeing [employer];baudilio screwup with spiriva;uses rescue inhaler bid;    Hypertension    Hyperlipidemia    Other     depression;discussed;    Other     stroke-walker at home;no trouble eating;has pt/ot in home;     Chief complaint present illness: 68-year-old white male presents accompanied by spouse in follow-up to chronic medical problems as well as his recent stroke and reintroduction to life at home. Wife doesn't say a lot but she seems to be a little stressed out by the changes in the patient. Patient does admit to some depression. Patient does admit to some limitations in activities and thinking. But tends to make light of them. Review of Systems   Constitutional: Positive for activity change and fatigue. Negative for appetite change and fever. Respiratory: Positive for cough and shortness of breath. On oxygen   Cardiovascular: Negative. Gastrointestinal: Negative. Negative for blood in stool and constipation. Genitourinary: Negative. Musculoskeletal: Positive for gait problem (uses walker at home; no falls in the last month). Skin: Negative. Neurological: Positive for speech difficulty (admits to some word finding difficulties) and weakness (continues with some weakness in the left upper extremity). Negative for headaches. Psychiatric/Behavioral: Positive for dysphoric mood. Negative for confusion. background/entire past medical,social and family history obtained and reviewed/updated today   Objective:   Physical Exam   Constitutional: He appears well-developed and well-nourished. No distress. Cardiovascular: Normal rate and normal heart sounds. Slight irregularity of rhythmrare PVC or PAC? Pulmonary/Chest: Effort normal. He has rales (scattered rales).    Bilateral markedly diminished breath sounds   Musculoskeletal: He exhibits no edema or tenderness. Neurological: He is alert. Skin: Skin is warm. No rash noted. No pallor. Psychiatric: He has a normal mood and affect. Nursing note and vitals reviewed. /60   Pulse 61   Temp 98.3 °F (36.8 °C) (Oral)   Wt 181 lb 3.2 oz (82.2 kg)   SpO2 92%   BMI 28.38 kg/m²     Assessment:      Yusuf Wright was seen today for follow-up, copd, hypertension, hyperlipidemia, other and other. Diagnoses and all orders for this visit:    Panlobular emphysema ([address]    Intracranial hemorrhage ([facility] [address]           Plan:      Return in about 3 months (around 10/2/2018). There are no Patient Instructions on file for this visit.

## 2018-07-03 NOTE — TELEPHONE ENCOUNTER
Have 2 possibilitiessome type of card so that he can get it cheaper or put him on ipratropium solution unit dose to use via hand-held nebulizer 4 times a day

## 2018-07-03 NOTE — TELEPHONE ENCOUNTER
I spoke to the pharmacy and they checked to see if there was anything else cheaper then spirva and there is not. If we do the ipratropium we will have to send that to a [facility] pharmacy. We need to check with patient to see if he is willing to do a nebulizer. I have not looked for any copay cards for spiriva yet.

## 2018-07-03 NOTE — TELEPHONE ENCOUNTER
There is no savings/discount card for spiriva, so I called the patient and asked him if he wanted to do a nebulizer with solution 4 times a day.   Patient stated he would have to think about and then let us know

## 2018-07-04 VITALS
OXYGEN SATURATION: 92 % | DIASTOLIC BLOOD PRESSURE: 60 MMHG | TEMPERATURE: 98.3 F | SYSTOLIC BLOOD PRESSURE: 120 MMHG | WEIGHT: 181.2 LBS | BODY MASS INDEX: 28.38 KG/M2 | HEART RATE: 61 BPM

## 2018-07-04 ASSESSMENT — ENCOUNTER SYMPTOMS
GASTROINTESTINAL NEGATIVE: 1
CONSTIPATION: 0
BLOOD IN STOOL: 0
SHORTNESS OF BREATH: 1
COUGH: 1

## 2018-07-26 RX ORDER — TAMSULOSIN HYDROCHLORIDE 0.4 MG/1
CAPSULE ORAL
Qty: 60 CAPSULE | Refills: 5 | Status: SHIPPED | OUTPATIENT
Start: 2018-07-26 | End: 2018-10-01 | Stop reason: SDUPTHER

## 2018-08-03 ENCOUNTER — TELEPHONE (OUTPATIENT)
Dept: FAMILY MEDICINE CLINIC | Age: 78
End: 2018-08-03

## 2018-08-03 NOTE — TELEPHONE ENCOUNTER
[id number] is re-certifying patient for in home physical therapy on 8-5-18, twice a week for 8 weeks.

## 2018-08-13 RX ORDER — FLUTICASONE PROPIONATE 50 MCG
SPRAY, SUSPENSION (ML) NASAL
Qty: 16 G | Refills: 5 | Status: SHIPPED | OUTPATIENT
Start: 2018-08-13 | End: 2018-10-01 | Stop reason: SDUPTHER

## 2018-08-21 NOTE — TELEPHONE ENCOUNTER
Refill Request for sertraline (ZOLOFT) 50 MG tablet     Last visit- 7/2/18    Told to follow up- 3 months    Pending appointment- None    Additional Comments -

## 2018-09-13 DIAGNOSIS — I10 ESSENTIAL HYPERTENSION: ICD-10-CM

## 2018-09-13 RX ORDER — LISINOPRIL 10 MG/1
TABLET ORAL
Qty: 90 TABLET | Refills: 1 | Status: SHIPPED | OUTPATIENT
Start: 2018-09-13 | End: 2018-10-01 | Stop reason: SDUPTHER

## 2018-09-13 NOTE — TELEPHONE ENCOUNTER
Patient went to refill his Lisinopril but the pharmacy only has an rx for 20 MG. On 6/21/18 you changed his medication from 20 MG to 10 MG and the new Rx was never sent to the pharmacy.

## 2018-09-24 DIAGNOSIS — R12 HEARTBURN: ICD-10-CM

## 2018-09-24 RX ORDER — PANTOPRAZOLE SODIUM 40 MG/1
TABLET, DELAYED RELEASE ORAL
Qty: 30 TABLET | Refills: 0 | Status: SHIPPED | OUTPATIENT
Start: 2018-09-24 | End: 2018-10-01 | Stop reason: SDUPTHER

## 2018-09-27 DIAGNOSIS — I10 ESSENTIAL HYPERTENSION: ICD-10-CM

## 2018-09-27 RX ORDER — HYDROCHLOROTHIAZIDE 25 MG/1
TABLET ORAL
Qty: 30 TABLET | Refills: 0 | Status: SHIPPED | OUTPATIENT
Start: 2018-09-27 | End: 2018-10-01 | Stop reason: SDUPTHER

## 2018-10-01 ENCOUNTER — OFFICE VISIT (OUTPATIENT)
Dept: FAMILY MEDICINE CLINIC | Age: 78
End: 2018-10-01
Payer: MEDICARE

## 2018-10-01 VITALS
SYSTOLIC BLOOD PRESSURE: 126 MMHG | WEIGHT: 182 LBS | BODY MASS INDEX: 28.51 KG/M2 | DIASTOLIC BLOOD PRESSURE: 58 MMHG | OXYGEN SATURATION: 94 % | TEMPERATURE: 97.3 F | HEART RATE: 64 BPM

## 2018-10-01 DIAGNOSIS — I10 ESSENTIAL HYPERTENSION: ICD-10-CM

## 2018-10-01 DIAGNOSIS — F32.A DEPRESSION, UNSPECIFIED DEPRESSION TYPE: ICD-10-CM

## 2018-10-01 DIAGNOSIS — J43.1 PANLOBULAR EMPHYSEMA (HCC): ICD-10-CM

## 2018-10-01 DIAGNOSIS — E78.5 HYPERLIPIDEMIA WITH TARGET LDL LESS THAN 130: ICD-10-CM

## 2018-10-01 DIAGNOSIS — Z23 NEED FOR INFLUENZA VACCINATION: Primary | ICD-10-CM

## 2018-10-01 DIAGNOSIS — R12 HEARTBURN: ICD-10-CM

## 2018-10-01 PROCEDURE — G8926 SPIRO NO PERF OR DOC: HCPCS | Performed by: FAMILY MEDICINE

## 2018-10-01 PROCEDURE — G8417 CALC BMI ABV UP PARAM F/U: HCPCS | Performed by: FAMILY MEDICINE

## 2018-10-01 PROCEDURE — G0008 ADMIN INFLUENZA VIRUS VAC: HCPCS | Performed by: FAMILY MEDICINE

## 2018-10-01 PROCEDURE — 4040F PNEUMOC VAC/ADMIN/RCVD: CPT | Performed by: FAMILY MEDICINE

## 2018-10-01 PROCEDURE — G8598 ASA/ANTIPLAT THER USED: HCPCS | Performed by: FAMILY MEDICINE

## 2018-10-01 PROCEDURE — G8427 DOCREV CUR MEDS BY ELIG CLIN: HCPCS | Performed by: FAMILY MEDICINE

## 2018-10-01 PROCEDURE — 3023F SPIROM DOC REV: CPT | Performed by: FAMILY MEDICINE

## 2018-10-01 PROCEDURE — 99214 OFFICE O/P EST MOD 30 MIN: CPT | Performed by: FAMILY MEDICINE

## 2018-10-01 PROCEDURE — 36415 COLL VENOUS BLD VENIPUNCTURE: CPT | Performed by: FAMILY MEDICINE

## 2018-10-01 PROCEDURE — 1101F PT FALLS ASSESS-DOCD LE1/YR: CPT | Performed by: FAMILY MEDICINE

## 2018-10-01 PROCEDURE — 1123F ACP DISCUSS/DSCN MKR DOCD: CPT | Performed by: FAMILY MEDICINE

## 2018-10-01 PROCEDURE — G8482 FLU IMMUNIZE ORDER/ADMIN: HCPCS | Performed by: FAMILY MEDICINE

## 2018-10-01 PROCEDURE — 90662 IIV NO PRSV INCREASED AG IM: CPT | Performed by: FAMILY MEDICINE

## 2018-10-01 PROCEDURE — 1036F TOBACCO NON-USER: CPT | Performed by: FAMILY MEDICINE

## 2018-10-01 RX ORDER — SIMVASTATIN 10 MG
TABLET ORAL
Qty: 30 TABLET | Refills: 3 | Status: SHIPPED | OUTPATIENT
Start: 2018-10-01 | End: 2018-10-08 | Stop reason: ALTCHOICE

## 2018-10-01 RX ORDER — FLUTICASONE FUROATE AND VILANTEROL 100; 25 UG/1; UG/1
POWDER RESPIRATORY (INHALATION)
Qty: 1 EACH | Refills: 5 | Status: SHIPPED | OUTPATIENT
Start: 2018-10-01 | End: 2019-07-18 | Stop reason: SDUPTHER

## 2018-10-01 RX ORDER — FLUTICASONE PROPIONATE 50 MCG
SPRAY, SUSPENSION (ML) NASAL
Qty: 16 G | Refills: 5 | Status: SHIPPED | OUTPATIENT
Start: 2018-10-01 | End: 2019-09-24 | Stop reason: SDUPTHER

## 2018-10-01 RX ORDER — PANTOPRAZOLE SODIUM 40 MG/1
TABLET, DELAYED RELEASE ORAL
Qty: 30 TABLET | Refills: 3 | Status: SHIPPED | OUTPATIENT
Start: 2018-10-01 | End: 2019-01-04 | Stop reason: SDUPTHER

## 2018-10-01 RX ORDER — HYDROCHLOROTHIAZIDE 25 MG/1
TABLET ORAL
Qty: 30 TABLET | Refills: 3 | Status: SHIPPED | OUTPATIENT
Start: 2018-10-01 | End: 2019-01-04 | Stop reason: SDUPTHER

## 2018-10-01 RX ORDER — LISINOPRIL 10 MG/1
TABLET ORAL
Qty: 90 TABLET | Refills: 1 | Status: SHIPPED | OUTPATIENT
Start: 2018-10-01 | End: 2019-01-04 | Stop reason: SDUPTHER

## 2018-10-01 RX ORDER — TAMSULOSIN HYDROCHLORIDE 0.4 MG/1
CAPSULE ORAL
Qty: 60 CAPSULE | Refills: 3 | Status: SHIPPED | OUTPATIENT
Start: 2018-10-01 | End: 2019-01-04 | Stop reason: SDUPTHER

## 2018-10-01 NOTE — PROGRESS NOTES
Blood drawn per order. Needle size: 21 g  Site: L Antecubital.  First attempt successful Yes    Second attempt na    Pressure applied until bleeding stopped. Cotton ball and band aid  applied. Patient informed to call office or return if bleeding reoccurs and unable to stop.     Tubes drawn: 1 purple     1 red
hypertension  -     hydrochlorothiazide (HYDRODIURIL) 25 MG tablet; TAKE (1) TABLET DAILY  -     lisinopril (PRINIVIL;ZESTRIL) 10 MG tablet; TAKE (1) TABLET DAILY  -     metoprolol tartrate (LOPRESSOR) 25 MG tablet; TAKE 1 TABLET TWICE DAILY  -     Renal Function Panel    Heartburn  -     pantoprazole (PROTONIX) 40 MG tablet; TAKE (1) TABLET DAILY    Hyperlipidemia with target LDL less than 130  -     Lipid Panel  -     simvastatin (ZOCOR) 10 MG tablet; TAKE ONE TABLET BY MOUTH EVERY EVENING    Depression, unspecified depression type  -     sertraline (ZOLOFT) 50 MG tablet; TAKE (1) TABLET DAILY    Panlobular emphysema (HCC)  -     fluticasone-vilanterol (BREO ELLIPTA) 100-25 MCG/INH AEPB inhaler; INHALE 1 PUFF DAILY    Other orders  -     INFLUENZA, HIGH DOSE, 65 YRS +, IM, PF, PREFILL SYR, 0.5ML (FLUZONE HD)  -     fluticasone (FLONASE) 50 MCG/ACT nasal spray; USE 2 SPRAYS INTO EACH NOSTRIL DAILY  -     tamsulosin (FLOMAX) 0.4 MG capsule; TAKE 2 CAPSULES AT BEDTIME           Plan:      Return in about 3 months (around 1/1/2019). There are no Patient Instructions on file for this visit.          Leander Carrillo MA

## 2018-10-02 LAB
ALBUMIN SERPL-MCNC: 4.2 G/DL (ref 3.4–5)
ANION GAP SERPL CALCULATED.3IONS-SCNC: 13 MMOL/L (ref 3–16)
BUN BLDV-MCNC: 13 MG/DL (ref 7–20)
CALCIUM SERPL-MCNC: 10.1 MG/DL (ref 8.3–10.6)
CHLORIDE BLD-SCNC: 93 MMOL/L (ref 99–110)
CHOLESTEROL, TOTAL: 199 MG/DL (ref 0–199)
CO2: 32 MMOL/L (ref 21–32)
CREAT SERPL-MCNC: 0.7 MG/DL (ref 0.8–1.3)
GFR AFRICAN AMERICAN: >60
GFR NON-AFRICAN AMERICAN: >60
GLUCOSE BLD-MCNC: 120 MG/DL (ref 70–99)
HDLC SERPL-MCNC: 54 MG/DL (ref 40–60)
LDL CHOLESTEROL CALCULATED: 129 MG/DL
PHOSPHORUS: 2.8 MG/DL (ref 2.5–4.9)
POTASSIUM SERPL-SCNC: 3.8 MMOL/L (ref 3.5–5.1)
SODIUM BLD-SCNC: 138 MMOL/L (ref 136–145)
TRIGL SERPL-MCNC: 82 MG/DL (ref 0–150)
VLDLC SERPL CALC-MCNC: 16 MG/DL

## 2018-10-08 RX ORDER — ATORVASTATIN CALCIUM 20 MG/1
20 TABLET, FILM COATED ORAL DAILY
Qty: 30 TABLET | Refills: 5 | Status: SHIPPED | OUTPATIENT
Start: 2018-10-08 | End: 2019-01-04 | Stop reason: SDUPTHER

## 2018-10-09 ENCOUNTER — TELEPHONE (OUTPATIENT)
Dept: FAMILY MEDICINE CLINIC | Age: 78
End: 2018-10-09

## 2018-10-09 NOTE — TELEPHONE ENCOUNTER
Patient states he got a flu shot on 10/1 and he is having terrible pain in his left arm and he can't lift his arm, states the pain is really bad. That is the arm that was affected by the stroke when he had it, he is wondering if that has anything to do with why he is having so much pain from the injection?   And what needs to be done to evaluate why he is having this pain

## 2018-10-10 ENCOUNTER — OFFICE VISIT (OUTPATIENT)
Dept: FAMILY MEDICINE CLINIC | Age: 78
End: 2018-10-10
Payer: MEDICARE

## 2018-10-10 VITALS
TEMPERATURE: 97.4 F | SYSTOLIC BLOOD PRESSURE: 105 MMHG | DIASTOLIC BLOOD PRESSURE: 60 MMHG | OXYGEN SATURATION: 95 % | HEART RATE: 59 BPM

## 2018-10-10 DIAGNOSIS — M19.212 OTHER SECONDARY OSTEOARTHRITIS OF LEFT SHOULDER: Primary | ICD-10-CM

## 2018-10-10 PROCEDURE — 1036F TOBACCO NON-USER: CPT | Performed by: FAMILY MEDICINE

## 2018-10-10 PROCEDURE — G8427 DOCREV CUR MEDS BY ELIG CLIN: HCPCS | Performed by: FAMILY MEDICINE

## 2018-10-10 PROCEDURE — 1123F ACP DISCUSS/DSCN MKR DOCD: CPT | Performed by: FAMILY MEDICINE

## 2018-10-10 PROCEDURE — G8598 ASA/ANTIPLAT THER USED: HCPCS | Performed by: FAMILY MEDICINE

## 2018-10-10 PROCEDURE — 20605 DRAIN/INJ JOINT/BURSA W/O US: CPT | Performed by: FAMILY MEDICINE

## 2018-10-10 PROCEDURE — G8482 FLU IMMUNIZE ORDER/ADMIN: HCPCS | Performed by: FAMILY MEDICINE

## 2018-10-10 PROCEDURE — 1101F PT FALLS ASSESS-DOCD LE1/YR: CPT | Performed by: FAMILY MEDICINE

## 2018-10-10 PROCEDURE — 4040F PNEUMOC VAC/ADMIN/RCVD: CPT | Performed by: FAMILY MEDICINE

## 2018-10-10 PROCEDURE — 99213 OFFICE O/P EST LOW 20 MIN: CPT | Performed by: FAMILY MEDICINE

## 2018-10-10 PROCEDURE — G8417 CALC BMI ABV UP PARAM F/U: HCPCS | Performed by: FAMILY MEDICINE

## 2018-10-11 ENCOUNTER — APPOINTMENT (OUTPATIENT)
Dept: GENERAL RADIOLOGY | Age: 78
End: 2018-10-11
Payer: MEDICARE

## 2018-10-11 ENCOUNTER — HOSPITAL ENCOUNTER (EMERGENCY)
Age: 78
Discharge: HOME | End: 2018-10-11
Payer: MEDICARE

## 2018-10-11 VITALS
BODY MASS INDEX: 28.25 KG/M2 | WEIGHT: 180 LBS | OXYGEN SATURATION: 94 % | HEIGHT: 67 IN | TEMPERATURE: 98.1 F | RESPIRATION RATE: 20 BRPM | HEART RATE: 58 BPM | DIASTOLIC BLOOD PRESSURE: 82 MMHG | SYSTOLIC BLOOD PRESSURE: 107 MMHG
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
## 2018-10-11 DIAGNOSIS — M25.552 LEFT HIP PAIN: ICD-10-CM

## 2018-10-11 DIAGNOSIS — S39.012A LOW BACK STRAIN, INITIAL ENCOUNTER: ICD-10-CM

## 2018-10-11 DIAGNOSIS — M25.512 ARTHRALGIA OF SHOULDER REGION, LEFT: Primary | ICD-10-CM

## 2018-10-11 LAB
BILIRUBIN URINE: NEGATIVE
BLOOD, URINE: NEGATIVE
CLARITY: CLEAR
COLOR: YELLOW
GLUCOSE URINE: NEGATIVE MG/DL
KETONES, URINE: NEGATIVE MG/DL
LEUKOCYTE ESTERASE, URINE: NEGATIVE
MICROSCOPIC EXAMINATION: NORMAL
NITRITE, URINE: NEGATIVE
PH UA: 6.5
PROTEIN UA: NEGATIVE MG/DL
SPECIFIC GRAVITY UA: 1.01
URINE REFLEX TO CULTURE: NORMAL
URINE TYPE: NORMAL
UROBILINOGEN, URINE: 1 E.U./DL

## 2018-10-11 PROCEDURE — 72100 X-RAY EXAM L-S SPINE 2/3 VWS: CPT

## 2018-10-11 PROCEDURE — 72170 X-RAY EXAM OF PELVIS: CPT

## 2018-10-11 PROCEDURE — 99284 EMERGENCY DEPT VISIT MOD MDM: CPT

## 2018-10-11 PROCEDURE — 81003 URINALYSIS AUTO W/O SCOPE: CPT

## 2018-10-11 PROCEDURE — 73030 X-RAY EXAM OF SHOULDER: CPT

## 2018-10-11 PROCEDURE — 6370000000 HC RX 637 (ALT 250 FOR IP): Performed by: PHYSICIAN ASSISTANT

## 2018-10-11 RX ORDER — METHOCARBAMOL 500 MG/1
500 TABLET, FILM COATED ORAL 3 TIMES DAILY
Qty: 10 TABLET | Refills: 0 | Status: SHIPPED | OUTPATIENT
Start: 2018-10-11 | End: 2018-10-15

## 2018-10-11 RX ORDER — HYDROCODONE BITARTRATE AND ACETAMINOPHEN 5; 325 MG/1; MG/1
1 TABLET ORAL ONCE
Status: COMPLETED | OUTPATIENT
Start: 2018-10-11 | End: 2018-10-11

## 2018-10-11 RX ADMIN — HYDROCODONE BITARTRATE AND ACETAMINOPHEN 1 TABLET: 5; 325 TABLET ORAL at 15:20

## 2018-10-11 ASSESSMENT — ENCOUNTER SYMPTOMS
SHORTNESS OF BREATH: 0
COLOR CHANGE: 0
ABDOMINAL PAIN: 0
VOMITING: 0
BACK PAIN: 1

## 2018-10-11 ASSESSMENT — PAIN DESCRIPTION - LOCATION
LOCATION: BACK;SHOULDER
LOCATION: SHOULDER;BACK

## 2018-10-11 ASSESSMENT — PAIN DESCRIPTION - DESCRIPTORS
DESCRIPTORS: ACHING;DISCOMFORT
DESCRIPTORS: ACHING;DISCOMFORT

## 2018-10-11 ASSESSMENT — PAIN SCALES - GENERAL
PAINLEVEL_OUTOF10: 6
PAINLEVEL_OUTOF10: 10
PAINLEVEL_OUTOF10: 8

## 2018-10-11 ASSESSMENT — PAIN DESCRIPTION - PAIN TYPE
TYPE: ACUTE PAIN
TYPE: CHRONIC PAIN

## 2018-10-11 ASSESSMENT — PAIN DESCRIPTION - ORIENTATION
ORIENTATION: LOWER
ORIENTATION: LEFT;LOWER

## 2018-10-11 ASSESSMENT — PAIN DESCRIPTION - FREQUENCY
FREQUENCY: INTERMITTENT
FREQUENCY: INTERMITTENT

## 2018-10-11 NOTE — ED PROVIDER NOTES
Evaluated by JUNIOR supervising physician available for consult    Patient came in complaining of low back pain and left shoulder pain. States he fell about 5 days ago he was walking into the bedroom and his wife was coming out states he thought she was going to trip over his oxygen tank and he bent over to move it out of the way and he stumbled and lost his balance he fell down on his knees. States did not fall onto the back, did not hit his head or loss consciousness. No dizziness. mechanical fall. States his lower back has been bothering him since the fall thinks he twisted it. Hx chronic low back pain. States even more painful is his left shoulder this has been bothering him for the past 2 weeks constant pain states it started when he got his flu shot at the left upper arm. States it was a little sore and then it worsened and now painful with movement and putting pressure on his arm and difficulty with movement of his shoulder. He have a history of prior stroke with residual left-sided weakness of left arm and left leg. No redness, swelling or any signs of infection of the shoulder. States pain is deep in the shoulder joint hurts when he moves his shoulder. He saw his family doctor yesterday who gave cortisone injection at the shoulder states it has not helped. States they did not give him any pain medication because they were afraid he would fall again. He is requesting pain medication here. He has a history of left shoulder problem with evaluation MRI of his shoulder in 2013 showing tendinitis. Does not think he hit his shoulder during the fall but may have twisted it exacerbating his pain. Patient denies any chest pain. No shortness of breath. Has chronic lung disease. States nothing else bothering him. No abdominal pain no urinary symptoms. No pain into legs states a little soreness at left hip. Has been ambulatory.  States his doctor told him he will need to see orthopedist. Alvah Schaumann in today for fluticasone (FLONASE) 50 MCG/ACT nasal spray USE 2 SPRAYS INTO EACH NOSTRIL DAILY 10/1/18  Yes Rickey Hoffmann MD   hydrochlorothiazide (HYDRODIURIL) 25 MG tablet TAKE (1) TABLET DAILY 10/1/18  Yes Rickey Hoffmann MD   lisinopril (PRINIVIL;ZESTRIL) 10 MG tablet TAKE (1) TABLET DAILY 10/1/18  Yes Rickey Hoffmann MD   metoprolol tartrate (LOPRESSOR) 25 MG tablet TAKE 1 TABLET TWICE DAILY 10/1/18  Yes Rickey Hoffmann MD   pantoprazole (PROTONIX) 40 MG tablet TAKE (1) TABLET DAILY 10/1/18  Yes Rickey Hoffmann MD   sertraline (ZOLOFT) 50 MG tablet TAKE (1) TABLET DAILY 10/1/18  Yes Rickey Hoffmann MD   tamsulosin (FLOMAX) 0.4 MG capsule TAKE 2 CAPSULES AT BEDTIME 10/1/18  Yes Rickey Hoffmann MD   tiotropium (SPIRIVA) 18 MCG inhalation capsule Inhale 1 capsule into the lungs daily 6/8/18  Yes Rickey Hoffmann MD   finasteride (PROSCAR) 5 MG tablet Take 5 mg by mouth daily   Yes Historical Provider, MD   polyethylene glycol (GLYCOLAX) powder Take 17 g by mouth daily   Yes Historical Provider, MD   acetaminophen (TYLENOL) 325 MG tablet Take 2 tablets by mouth every 4 hours as needed for Fever (Fever >100.5 (38 C), pain) 4/3/18  Yes Willy Bustamante MD   lactulose (CHRONULAC) 10 GM/15ML solution Take 15 mLs by mouth every evening 2/19/18  Yes Rickey Hoffmann MD   albuterol sulfate HFA (VENTOLIN HFA) 108 (90 Base) MCG/ACT inhaler Inhale 2 puffs into the lungs every 6 hours as needed for Wheezing 1/11/18  Yes Fede Fine MD   potassium chloride (KLOR-CON M) 20 MEQ extended release tablet Take 1 tablet by mouth daily (with breakfast) 4/4/18   Willy Bustamante MD        ALLERGIES  has No Known Allergies. /82   Pulse 58   Temp 98.1 °F (36.7 °C) (Oral)   Resp 20   Ht 5' 7\" (1.702 m)   Wt 180 lb (81.6 kg)   SpO2 94%   BMI 28.19 kg/m²     Physical Exam   Constitutional: He is oriented to person, place, and time. He appears well-developed and well-nourished.   Non-toxic

## 2018-10-16 ENCOUNTER — OFFICE VISIT (OUTPATIENT)
Dept: ORTHOPEDIC SURGERY | Age: 78
End: 2018-10-16
Payer: MEDICARE

## 2018-10-16 VITALS — BODY MASS INDEX: 28.24 KG/M2 | WEIGHT: 179.9 LBS | HEIGHT: 67 IN

## 2018-10-16 DIAGNOSIS — M25.512 ACUTE PAIN OF LEFT SHOULDER: Primary | ICD-10-CM

## 2018-10-16 PROCEDURE — G8482 FLU IMMUNIZE ORDER/ADMIN: HCPCS | Performed by: ORTHOPAEDIC SURGERY

## 2018-10-16 PROCEDURE — 99203 OFFICE O/P NEW LOW 30 MIN: CPT | Performed by: ORTHOPAEDIC SURGERY

## 2018-10-16 PROCEDURE — 4040F PNEUMOC VAC/ADMIN/RCVD: CPT | Performed by: ORTHOPAEDIC SURGERY

## 2018-10-16 PROCEDURE — 1036F TOBACCO NON-USER: CPT | Performed by: ORTHOPAEDIC SURGERY

## 2018-10-16 PROCEDURE — G8417 CALC BMI ABV UP PARAM F/U: HCPCS | Performed by: ORTHOPAEDIC SURGERY

## 2018-10-16 PROCEDURE — G8427 DOCREV CUR MEDS BY ELIG CLIN: HCPCS | Performed by: ORTHOPAEDIC SURGERY

## 2018-10-16 PROCEDURE — 1101F PT FALLS ASSESS-DOCD LE1/YR: CPT | Performed by: ORTHOPAEDIC SURGERY

## 2018-10-16 PROCEDURE — G8598 ASA/ANTIPLAT THER USED: HCPCS | Performed by: ORTHOPAEDIC SURGERY

## 2018-10-16 PROCEDURE — 1123F ACP DISCUSS/DSCN MKR DOCD: CPT | Performed by: ORTHOPAEDIC SURGERY

## 2018-10-16 RX ORDER — TRAMADOL HYDROCHLORIDE 50 MG/1
50 TABLET ORAL EVERY 6 HOURS PRN
Qty: 28 TABLET | Refills: 0 | Status: SHIPPED | OUTPATIENT
Start: 2018-10-16 | End: 2018-10-23

## 2018-10-16 NOTE — PROGRESS NOTES
Packs/day: 1.00     Years: 52.00     Types: Cigarettes     Quit date: 5/6/2010    Smokeless tobacco: Never Used    Alcohol use 0.0 oz/week      Comment: occasionally    Drug use: No    Sexual activity: Not Currently     Partners: Female     Other Topics Concern    Not on file     Social History Narrative    No narrative on file       Family History   Problem Relation Age of Onset    Emphysema Mother     Cancer Brother         lung cancer    Asthma Neg Hx     Heart Failure Neg Hx         Review of Systems  Pertinent items are noted in HPI  Review of systems reviewed from Patient History Form dated on 10/16/18 and available in the patient's chart under the Media tab. Vital Signs  There were no vitals filed for this visit. General Exam:   Constitutional: Patient is adequately groomed with no evidence of malnutrition  Mental Status: The patient is oriented to time, place and person. The patient's mood and affect are appropriate. Lymphatic: The lymphatic examination bilaterally reveals all areas to be without enlargement or induration. Neurological: The patient has good coordination. There is no weakness or sensory deficit. Gait: normal    Left Shoulder Examination    Inspection:  No visible atrophy, slight bruising lateral aspect of deltoid likely at the site of his flu shot    Palpation:  Mild tenderness globally around the shoulder including the trapezius    Cervical Exam reproduces shoulder symptoms: Negative    Range of Motion: Forward elevation: 100 versus 170  External rotation at 0 degrees abduction: 30 versus 30  Internal rotation to:  Hip versus L5  External rotation at 90 degrees abduction: 90  Internal rotation at 90 degrees abduction: 80    Sensation: In tact to light touch all nerve distributions     Strength:   3/5 supraspinatus  4/5 external rotation  4/5 internal rotation  5/5 anterior deltoid strength testing  5/5 abduction strength testing  Lift off: not done, mild weakness

## 2018-10-19 ENCOUNTER — HOSPITAL ENCOUNTER (OUTPATIENT)
Dept: MRI IMAGING | Age: 78
Discharge: HOME | End: 2018-10-19
Payer: MEDICARE

## 2018-10-19 DIAGNOSIS — M25.512 ACUTE PAIN OF LEFT SHOULDER: ICD-10-CM

## 2018-10-19 PROCEDURE — 73221 MRI JOINT UPR EXTREM W/O DYE: CPT

## 2018-11-06 ENCOUNTER — OFFICE VISIT (OUTPATIENT)
Dept: ORTHOPEDIC SURGERY | Age: 78
End: 2018-11-06
Payer: MEDICARE

## 2018-11-06 VITALS — BODY MASS INDEX: 28.24 KG/M2 | HEIGHT: 67 IN | WEIGHT: 179.9 LBS

## 2018-11-06 DIAGNOSIS — M75.112 INCOMPLETE TEAR OF LEFT ROTATOR CUFF: Primary | ICD-10-CM

## 2018-11-06 PROCEDURE — 1101F PT FALLS ASSESS-DOCD LE1/YR: CPT | Performed by: ORTHOPAEDIC SURGERY

## 2018-11-06 PROCEDURE — 1123F ACP DISCUSS/DSCN MKR DOCD: CPT | Performed by: ORTHOPAEDIC SURGERY

## 2018-11-06 PROCEDURE — 99213 OFFICE O/P EST LOW 20 MIN: CPT | Performed by: ORTHOPAEDIC SURGERY

## 2018-11-06 PROCEDURE — 4040F PNEUMOC VAC/ADMIN/RCVD: CPT | Performed by: ORTHOPAEDIC SURGERY

## 2018-11-06 PROCEDURE — 1036F TOBACCO NON-USER: CPT | Performed by: ORTHOPAEDIC SURGERY

## 2018-11-06 PROCEDURE — G8427 DOCREV CUR MEDS BY ELIG CLIN: HCPCS | Performed by: ORTHOPAEDIC SURGERY

## 2018-11-06 PROCEDURE — G8598 ASA/ANTIPLAT THER USED: HCPCS | Performed by: ORTHOPAEDIC SURGERY

## 2018-11-06 PROCEDURE — G8417 CALC BMI ABV UP PARAM F/U: HCPCS | Performed by: ORTHOPAEDIC SURGERY

## 2018-11-06 PROCEDURE — G8482 FLU IMMUNIZE ORDER/ADMIN: HCPCS | Performed by: ORTHOPAEDIC SURGERY

## 2019-01-04 ENCOUNTER — OFFICE VISIT (OUTPATIENT)
Dept: FAMILY MEDICINE CLINIC | Age: 79
End: 2019-01-04
Payer: MEDICARE

## 2019-01-04 DIAGNOSIS — F32.A DEPRESSION, UNSPECIFIED DEPRESSION TYPE: ICD-10-CM

## 2019-01-04 DIAGNOSIS — I69.391 DYSPHAGIA AS LATE EFFECT OF CEREBROVASCULAR ACCIDENT (CVA): ICD-10-CM

## 2019-01-04 DIAGNOSIS — N40.1 BENIGN PROSTATIC HYPERPLASIA WITH LOWER URINARY TRACT SYMPTOMS, SYMPTOM DETAILS UNSPECIFIED: ICD-10-CM

## 2019-01-04 DIAGNOSIS — R12 HEARTBURN: ICD-10-CM

## 2019-01-04 DIAGNOSIS — R53.83 OTHER FATIGUE: ICD-10-CM

## 2019-01-04 DIAGNOSIS — I48.0 PAROXYSMAL ATRIAL FIBRILLATION (HCC): ICD-10-CM

## 2019-01-04 DIAGNOSIS — I10 ESSENTIAL HYPERTENSION: Primary | ICD-10-CM

## 2019-01-04 DIAGNOSIS — J43.1 PANLOBULAR EMPHYSEMA (HCC): ICD-10-CM

## 2019-01-04 LAB
A/G RATIO: 2 (ref 1.1–2.2)
ALBUMIN SERPL-MCNC: 4.3 G/DL (ref 3.4–5)
ALP BLD-CCNC: 69 U/L (ref 40–129)
ALT SERPL-CCNC: 14 U/L (ref 10–40)
ANION GAP SERPL CALCULATED.3IONS-SCNC: 14 MMOL/L (ref 3–16)
AST SERPL-CCNC: 16 U/L (ref 15–37)
BILIRUB SERPL-MCNC: 0.7 MG/DL (ref 0–1)
BUN BLDV-MCNC: 9 MG/DL (ref 7–20)
CALCIUM SERPL-MCNC: 9.9 MG/DL (ref 8.3–10.6)
CHLORIDE BLD-SCNC: 85 MMOL/L (ref 99–110)
CO2: 31 MMOL/L (ref 21–32)
CREAT SERPL-MCNC: 0.5 MG/DL (ref 0.8–1.3)
GFR AFRICAN AMERICAN: >60
GFR NON-AFRICAN AMERICAN: >60
GLOBULIN: 2.2 G/DL
GLUCOSE BLD-MCNC: 95 MG/DL (ref 70–99)
HCT VFR BLD CALC: 39.8 % (ref 40.5–52.5)
HEMOGLOBIN: 13.5 G/DL (ref 13.5–17.5)
MCH RBC QN AUTO: 31.5 PG (ref 26–34)
MCHC RBC AUTO-ENTMCNC: 34 G/DL (ref 31–36)
MCV RBC AUTO: 92.8 FL (ref 80–100)
PDW BLD-RTO: 13.8 % (ref 12.4–15.4)
PLATELET # BLD: 202 K/UL (ref 135–450)
PMV BLD AUTO: 7.3 FL (ref 5–10.5)
POTASSIUM SERPL-SCNC: 3.7 MMOL/L (ref 3.5–5.1)
PROSTATE SPECIFIC ANTIGEN: 0.45 NG/ML (ref 0–4)
RBC # BLD: 4.29 M/UL (ref 4.2–5.9)
SODIUM BLD-SCNC: 130 MMOL/L (ref 136–145)
TOTAL PROTEIN: 6.5 G/DL (ref 6.4–8.2)
WBC # BLD: 6.8 K/UL (ref 4–11)

## 2019-01-04 PROCEDURE — 36415 COLL VENOUS BLD VENIPUNCTURE: CPT | Performed by: FAMILY MEDICINE

## 2019-01-04 PROCEDURE — G8427 DOCREV CUR MEDS BY ELIG CLIN: HCPCS | Performed by: FAMILY MEDICINE

## 2019-01-04 PROCEDURE — 99214 OFFICE O/P EST MOD 30 MIN: CPT | Performed by: FAMILY MEDICINE

## 2019-01-04 PROCEDURE — 3023F SPIROM DOC REV: CPT | Performed by: FAMILY MEDICINE

## 2019-01-04 PROCEDURE — 1036F TOBACCO NON-USER: CPT | Performed by: FAMILY MEDICINE

## 2019-01-04 PROCEDURE — G8417 CALC BMI ABV UP PARAM F/U: HCPCS | Performed by: FAMILY MEDICINE

## 2019-01-04 PROCEDURE — 4040F PNEUMOC VAC/ADMIN/RCVD: CPT | Performed by: FAMILY MEDICINE

## 2019-01-04 PROCEDURE — G8926 SPIRO NO PERF OR DOC: HCPCS | Performed by: FAMILY MEDICINE

## 2019-01-04 PROCEDURE — 1101F PT FALLS ASSESS-DOCD LE1/YR: CPT | Performed by: FAMILY MEDICINE

## 2019-01-04 PROCEDURE — G8482 FLU IMMUNIZE ORDER/ADMIN: HCPCS | Performed by: FAMILY MEDICINE

## 2019-01-04 PROCEDURE — 1123F ACP DISCUSS/DSCN MKR DOCD: CPT | Performed by: FAMILY MEDICINE

## 2019-01-04 RX ORDER — TAMSULOSIN HYDROCHLORIDE 0.4 MG/1
CAPSULE ORAL
Qty: 60 CAPSULE | Refills: 5 | Status: SHIPPED | OUTPATIENT
Start: 2019-01-04 | End: 2019-09-12 | Stop reason: SDUPTHER

## 2019-01-04 RX ORDER — PANTOPRAZOLE SODIUM 40 MG/1
TABLET, DELAYED RELEASE ORAL
Qty: 30 TABLET | Refills: 5 | Status: SHIPPED | OUTPATIENT
Start: 2019-01-04

## 2019-01-04 RX ORDER — LISINOPRIL 10 MG/1
TABLET ORAL
Qty: 30 TABLET | Refills: 5 | Status: SHIPPED | OUTPATIENT
Start: 2019-01-04 | End: 2019-06-20 | Stop reason: SDUPTHER

## 2019-01-04 RX ORDER — M-VIT,TX,IRON,MINS/CALC/FOLIC 27MG-0.4MG
1 TABLET ORAL DAILY
COMMUNITY

## 2019-01-04 RX ORDER — HYDROCHLOROTHIAZIDE 25 MG/1
TABLET ORAL
Qty: 30 TABLET | Refills: 5 | Status: SHIPPED | OUTPATIENT
Start: 2019-01-04 | End: 2019-09-27 | Stop reason: SDUPTHER

## 2019-01-04 RX ORDER — ATORVASTATIN CALCIUM 20 MG/1
20 TABLET, FILM COATED ORAL DAILY
Qty: 30 TABLET | Refills: 5 | Status: SHIPPED | OUTPATIENT
Start: 2019-01-04 | End: 2019-09-27 | Stop reason: SDUPTHER

## 2019-01-04 RX ORDER — FLUTICASONE FUROATE AND VILANTEROL 100; 25 UG/1; UG/1
1 POWDER RESPIRATORY (INHALATION) DAILY
Qty: 3 EACH | Refills: 0 | COMMUNITY
Start: 2019-01-04 | End: 2019-02-27 | Stop reason: SDUPTHER

## 2019-01-07 ENCOUNTER — TELEPHONE (OUTPATIENT)
Dept: FAMILY MEDICINE CLINIC | Age: 79
End: 2019-01-07

## 2019-01-07 VITALS
OXYGEN SATURATION: 94 % | TEMPERATURE: 98.4 F | SYSTOLIC BLOOD PRESSURE: 130 MMHG | WEIGHT: 180 LBS | HEART RATE: 56 BPM | DIASTOLIC BLOOD PRESSURE: 60 MMHG | BODY MASS INDEX: 28.19 KG/M2

## 2019-01-07 DIAGNOSIS — R79.89 LOW SERUM SODIUM: Primary | ICD-10-CM

## 2019-01-07 ASSESSMENT — ENCOUNTER SYMPTOMS
TROUBLE SWALLOWING: 1
CHOKING: 1
GASTROINTESTINAL NEGATIVE: 1

## 2019-01-11 ENCOUNTER — HOSPITAL ENCOUNTER (OUTPATIENT)
Dept: CT IMAGING | Age: 79
Discharge: HOME | End: 2019-01-11
Payer: MEDICARE

## 2019-01-11 DIAGNOSIS — R91.1 PULMONARY NODULE: ICD-10-CM

## 2019-01-11 DIAGNOSIS — R91.1 SOLITARY PULMONARY NODULE: ICD-10-CM

## 2019-01-11 PROCEDURE — 71250 CT THORAX DX C-: CPT

## 2019-01-14 ENCOUNTER — TELEPHONE (OUTPATIENT)
Dept: PULMONOLOGY | Age: 79
End: 2019-01-14

## 2019-01-15 ENCOUNTER — TELEPHONE (OUTPATIENT)
Dept: FAMILY MEDICINE CLINIC | Age: 79
End: 2019-01-15

## 2019-01-23 ENCOUNTER — OFFICE VISIT (OUTPATIENT)
Dept: FAMILY MEDICINE CLINIC | Age: 79
End: 2019-01-23
Payer: MEDICARE

## 2019-01-23 VITALS
SYSTOLIC BLOOD PRESSURE: 108 MMHG | HEART RATE: 64 BPM | BODY MASS INDEX: 28.12 KG/M2 | WEIGHT: 179.6 LBS | DIASTOLIC BLOOD PRESSURE: 62 MMHG | OXYGEN SATURATION: 94 %

## 2019-01-23 DIAGNOSIS — M48.50XA VERTEBRAL COMPRESSION FRACTURE (HCC): ICD-10-CM

## 2019-01-23 DIAGNOSIS — E87.1 HYPONATREMIA: ICD-10-CM

## 2019-01-23 DIAGNOSIS — R13.19 ESOPHAGEAL DYSPHAGIA: Primary | ICD-10-CM

## 2019-01-23 DIAGNOSIS — M80.00XA AGE-RELATED OSTEOPOROSIS WITH CURRENT PATHOLOGICAL FRACTURE, INITIAL ENCOUNTER: ICD-10-CM

## 2019-01-23 PROCEDURE — G8482 FLU IMMUNIZE ORDER/ADMIN: HCPCS | Performed by: FAMILY MEDICINE

## 2019-01-23 PROCEDURE — G8427 DOCREV CUR MEDS BY ELIG CLIN: HCPCS | Performed by: FAMILY MEDICINE

## 2019-01-23 PROCEDURE — 1123F ACP DISCUSS/DSCN MKR DOCD: CPT | Performed by: FAMILY MEDICINE

## 2019-01-23 PROCEDURE — 4040F PNEUMOC VAC/ADMIN/RCVD: CPT | Performed by: FAMILY MEDICINE

## 2019-01-23 PROCEDURE — 1036F TOBACCO NON-USER: CPT | Performed by: FAMILY MEDICINE

## 2019-01-23 PROCEDURE — 1101F PT FALLS ASSESS-DOCD LE1/YR: CPT | Performed by: FAMILY MEDICINE

## 2019-01-23 PROCEDURE — 36415 COLL VENOUS BLD VENIPUNCTURE: CPT | Performed by: FAMILY MEDICINE

## 2019-01-23 PROCEDURE — G8417 CALC BMI ABV UP PARAM F/U: HCPCS | Performed by: FAMILY MEDICINE

## 2019-01-23 PROCEDURE — 99214 OFFICE O/P EST MOD 30 MIN: CPT | Performed by: FAMILY MEDICINE

## 2019-01-24 LAB
ALBUMIN SERPL-MCNC: 4.4 G/DL (ref 3.4–5)
ANION GAP SERPL CALCULATED.3IONS-SCNC: 12 MMOL/L (ref 3–16)
BUN BLDV-MCNC: 10 MG/DL (ref 7–20)
CALCIUM SERPL-MCNC: 9.8 MG/DL (ref 8.3–10.6)
CHLORIDE BLD-SCNC: 88 MMOL/L (ref 99–110)
CO2: 29 MMOL/L (ref 21–32)
CREAT SERPL-MCNC: 0.5 MG/DL (ref 0.8–1.3)
GFR AFRICAN AMERICAN: >60
GFR NON-AFRICAN AMERICAN: >60
GLUCOSE BLD-MCNC: 99 MG/DL (ref 70–99)
PHOSPHORUS: 3.8 MG/DL (ref 2.5–4.9)
POTASSIUM SERPL-SCNC: 3.8 MMOL/L (ref 3.5–5.1)
SODIUM BLD-SCNC: 129 MMOL/L (ref 136–145)
VITAMIN D 25-HYDROXY: 36.8 NG/ML

## 2019-02-20 ENCOUNTER — TELEPHONE (OUTPATIENT)
Dept: FAMILY MEDICINE CLINIC | Age: 79
End: 2019-02-20

## 2019-02-20 DIAGNOSIS — R13.10 DYSPHAGIA, UNSPECIFIED TYPE: Primary | ICD-10-CM

## 2019-02-20 DIAGNOSIS — E87.1 HYPONATREMIA: ICD-10-CM

## 2019-02-20 DIAGNOSIS — M48.50XA VERTEBRAL COMPRESSION FRACTURE (HCC): ICD-10-CM

## 2019-02-21 RX ORDER — ZOLEDRONIC ACID 5 MG/100ML
5 INJECTION, SOLUTION INTRAVENOUS ONCE
Qty: 100 ML | Refills: 0 | Status: SHIPPED | OUTPATIENT
Start: 2019-02-21 | End: 2019-02-21 | Stop reason: CLARIF

## 2019-02-25 DIAGNOSIS — C34.32 MALIGNANT NEOPLASM OF BRONCHUS OF LEFT LOWER LOBE (HCC): Primary | ICD-10-CM

## 2019-02-25 DIAGNOSIS — R13.19 ESOPHAGEAL DYSPHAGIA: ICD-10-CM

## 2019-02-25 PROBLEM — R13.10 DYSPHAGIA: Status: ACTIVE | Noted: 2019-02-25

## 2019-02-25 RX ORDER — SODIUM CHLORIDE 0.9 % (FLUSH) 0.9 %
10 SYRINGE (ML) INJECTION PRN
Status: CANCELLED | OUTPATIENT
Start: 2019-03-01

## 2019-02-25 RX ORDER — ZOLEDRONIC ACID 5 MG/100ML
5 INJECTION, SOLUTION INTRAVENOUS ONCE
Status: CANCELLED | OUTPATIENT
Start: 2019-03-01 | End: 2019-02-28

## 2019-02-25 RX ORDER — 0.9 % SODIUM CHLORIDE 0.9 %
250 INTRAVENOUS SOLUTION INTRAVENOUS ONCE
Status: CANCELLED | OUTPATIENT
Start: 2019-03-01 | End: 2019-02-28

## 2019-02-27 ENCOUNTER — APPOINTMENT (OUTPATIENT)
Dept: GENERAL RADIOLOGY | Age: 79
End: 2019-02-27
Payer: MEDICARE

## 2019-02-27 ENCOUNTER — HOSPITAL ENCOUNTER (EMERGENCY)
Age: 79
Discharge: HOME | End: 2019-02-27
Attending: EMERGENCY MEDICINE
Payer: MEDICARE

## 2019-02-27 ENCOUNTER — HOSPITAL ENCOUNTER (OUTPATIENT)
Age: 79
Discharge: HOME | End: 2019-02-27
Payer: MEDICARE

## 2019-02-27 ENCOUNTER — OFFICE VISIT (OUTPATIENT)
Dept: PULMONOLOGY | Age: 79
End: 2019-02-27
Payer: MEDICARE

## 2019-02-27 VITALS
OXYGEN SATURATION: 94 % | DIASTOLIC BLOOD PRESSURE: 72 MMHG | WEIGHT: 180 LBS | TEMPERATURE: 97.3 F | SYSTOLIC BLOOD PRESSURE: 124 MMHG | BODY MASS INDEX: 28.25 KG/M2 | HEIGHT: 67 IN | HEART RATE: 59 BPM | RESPIRATION RATE: 20 BRPM

## 2019-02-27 VITALS
WEIGHT: 180 LBS | RESPIRATION RATE: 18 BRPM | BODY MASS INDEX: 28.19 KG/M2 | OXYGEN SATURATION: 94 % | DIASTOLIC BLOOD PRESSURE: 69 MMHG | SYSTOLIC BLOOD PRESSURE: 139 MMHG | TEMPERATURE: 98 F | HEART RATE: 66 BPM

## 2019-02-27 DIAGNOSIS — R13.10 DYSPHAGIA, UNSPECIFIED TYPE: ICD-10-CM

## 2019-02-27 DIAGNOSIS — S51.002A OPEN WOUND OF LEFT ELBOW, INITIAL ENCOUNTER: ICD-10-CM

## 2019-02-27 DIAGNOSIS — R91.1 PULMONARY NODULE: Primary | ICD-10-CM

## 2019-02-27 DIAGNOSIS — S41.112A ARM LACERATION, LEFT, INITIAL ENCOUNTER: ICD-10-CM

## 2019-02-27 DIAGNOSIS — J44.9 COPD, SEVERE (HCC): ICD-10-CM
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
## 2019-02-27 DIAGNOSIS — E87.1 HYPONATREMIA: ICD-10-CM

## 2019-02-27 DIAGNOSIS — M48.50XA VERTEBRAL COMPRESSION FRACTURE (HCC): ICD-10-CM

## 2019-02-27 DIAGNOSIS — J43.1 PANLOBULAR EMPHYSEMA (HCC): ICD-10-CM

## 2019-02-27 PROCEDURE — 80069 RENAL FUNCTION PANEL: CPT

## 2019-02-27 PROCEDURE — G8417 CALC BMI ABV UP PARAM F/U: HCPCS | Performed by: INTERNAL MEDICINE

## 2019-02-27 PROCEDURE — G8427 DOCREV CUR MEDS BY ELIG CLIN: HCPCS | Performed by: INTERNAL MEDICINE

## 2019-02-27 PROCEDURE — 4500000024 HC ED LEVEL 4 PROCEDURE

## 2019-02-27 PROCEDURE — 99284 EMERGENCY DEPT VISIT MOD MDM: CPT

## 2019-02-27 PROCEDURE — 4040F PNEUMOC VAC/ADMIN/RCVD: CPT | Performed by: INTERNAL MEDICINE

## 2019-02-27 PROCEDURE — G8926 SPIRO NO PERF OR DOC: HCPCS | Performed by: INTERNAL MEDICINE

## 2019-02-27 PROCEDURE — 36415 COLL VENOUS BLD VENIPUNCTURE: CPT

## 2019-02-27 PROCEDURE — 73080 X-RAY EXAM OF ELBOW: CPT

## 2019-02-27 PROCEDURE — 1101F PT FALLS ASSESS-DOCD LE1/YR: CPT | Performed by: INTERNAL MEDICINE

## 2019-02-27 PROCEDURE — 1036F TOBACCO NON-USER: CPT | Performed by: INTERNAL MEDICINE

## 2019-02-27 PROCEDURE — 1123F ACP DISCUSS/DSCN MKR DOCD: CPT | Performed by: INTERNAL MEDICINE

## 2019-02-27 PROCEDURE — 3023F SPIROM DOC REV: CPT | Performed by: INTERNAL MEDICINE

## 2019-02-27 PROCEDURE — G8482 FLU IMMUNIZE ORDER/ADMIN: HCPCS | Performed by: INTERNAL MEDICINE

## 2019-02-27 PROCEDURE — 73060 X-RAY EXAM OF HUMERUS: CPT

## 2019-02-27 PROCEDURE — 99214 OFFICE O/P EST MOD 30 MIN: CPT | Performed by: INTERNAL MEDICINE

## 2019-02-27 RX ORDER — FLUTICASONE FUROATE AND VILANTEROL 100; 25 UG/1; UG/1
1 POWDER RESPIRATORY (INHALATION) DAILY
Qty: 2 EACH | Refills: 0 | COMMUNITY
Start: 2019-02-27 | End: 2019-03-25 | Stop reason: ALTCHOICE

## 2019-02-27 ASSESSMENT — PAIN SCALES - GENERAL: PAINLEVEL_OUTOF10: 8

## 2019-02-27 ASSESSMENT — PAIN DESCRIPTION - ORIENTATION: ORIENTATION: LEFT

## 2019-02-27 ASSESSMENT — ENCOUNTER SYMPTOMS: RESPIRATORY NEGATIVE: 1

## 2019-02-27 ASSESSMENT — PAIN DESCRIPTION - LOCATION: LOCATION: ARM

## 2019-02-28 LAB
ALBUMIN SERPL-MCNC: 4.2 G/DL (ref 3.4–5)
ANION GAP SERPL CALCULATED.3IONS-SCNC: 14 MMOL/L (ref 3–16)
BUN BLDV-MCNC: 11 MG/DL (ref 7–20)
CALCIUM SERPL-MCNC: 9.9 MG/DL (ref 8.3–10.6)
CHLORIDE BLD-SCNC: 85 MMOL/L (ref 99–110)
CO2: 31 MMOL/L (ref 21–32)
CREAT SERPL-MCNC: <0.5 MG/DL (ref 0.8–1.3)
GFR AFRICAN AMERICAN: >60
GFR NON-AFRICAN AMERICAN: >60
GLUCOSE BLD-MCNC: 106 MG/DL (ref 70–99)
PHOSPHORUS: 2.4 MG/DL (ref 2.5–4.9)
POTASSIUM SERPL-SCNC: 3.4 MMOL/L (ref 3.5–5.1)
SODIUM BLD-SCNC: 130 MMOL/L (ref 136–145)

## 2019-03-01 ENCOUNTER — HOSPITAL ENCOUNTER (OUTPATIENT)
Dept: NURSING | Age: 79
Setting detail: INFUSION SERIES
Discharge: HOME | End: 2019-03-01
Payer: MEDICARE

## 2019-03-01 VITALS
WEIGHT: 180 LBS | SYSTOLIC BLOOD PRESSURE: 139 MMHG | RESPIRATION RATE: 18 BRPM | BODY MASS INDEX: 28.25 KG/M2 | HEART RATE: 57 BPM | OXYGEN SATURATION: 95 % | DIASTOLIC BLOOD PRESSURE: 81 MMHG | HEIGHT: 67 IN

## 2019-03-01 DIAGNOSIS — R13.10 DYSPHAGIA, UNSPECIFIED TYPE: ICD-10-CM

## 2019-03-01 DIAGNOSIS — M48.50XA VERTEBRAL COMPRESSION FRACTURE (HCC): ICD-10-CM

## 2019-03-01 DIAGNOSIS — C34.32 MALIGNANT NEOPLASM OF BRONCHUS OF LEFT LOWER LOBE (HCC): ICD-10-CM

## 2019-03-01 PROCEDURE — 6360000002 HC RX W HCPCS: Performed by: FAMILY MEDICINE

## 2019-03-01 PROCEDURE — 96365 THER/PROPH/DIAG IV INF INIT: CPT

## 2019-03-01 PROCEDURE — 2580000003 HC RX 258: Performed by: FAMILY MEDICINE

## 2019-03-01 RX ORDER — SODIUM CHLORIDE 0.9 % (FLUSH) 0.9 %
10 SYRINGE (ML) INJECTION PRN
Status: CANCELLED | OUTPATIENT
Start: 2019-03-01

## 2019-03-01 RX ORDER — ZOLEDRONIC ACID 5 MG/100ML
5 INJECTION, SOLUTION INTRAVENOUS ONCE
Status: CANCELLED | OUTPATIENT
Start: 2019-03-01 | End: 2019-03-01

## 2019-03-01 RX ORDER — 0.9 % SODIUM CHLORIDE 0.9 %
250 INTRAVENOUS SOLUTION INTRAVENOUS ONCE
Status: CANCELLED | OUTPATIENT
Start: 2019-03-01 | End: 2019-03-01

## 2019-03-01 RX ORDER — ZOLEDRONIC ACID 5 MG/100ML
5 INJECTION, SOLUTION INTRAVENOUS ONCE
Status: COMPLETED | OUTPATIENT
Start: 2019-03-01 | End: 2019-03-01

## 2019-03-01 RX ORDER — 0.9 % SODIUM CHLORIDE 0.9 %
250 INTRAVENOUS SOLUTION INTRAVENOUS ONCE
Status: COMPLETED | OUTPATIENT
Start: 2019-03-01 | End: 2019-03-01

## 2019-03-01 RX ORDER — SODIUM CHLORIDE 0.9 % (FLUSH) 0.9 %
10 SYRINGE (ML) INJECTION PRN
Status: DISCONTINUED | OUTPATIENT
Start: 2019-03-01 | End: 2019-03-01

## 2019-03-01 RX ADMIN — SODIUM CHLORIDE 100 ML: 9 INJECTION, SOLUTION INTRAVENOUS at 10:50

## 2019-03-01 RX ADMIN — ZOLEDRONIC ACID 5 MG: 5 INJECTION, SOLUTION INTRAVENOUS at 10:50

## 2019-03-01 RX ADMIN — Medication 10 ML: at 10:49

## 2019-03-01 ASSESSMENT — PAIN SCALES - GENERAL: PAINLEVEL_OUTOF10: 0

## 2019-03-01 NOTE — PROGRESS NOTES
Pt returned from [facility] without difficulty  IV was removed  Cath tip intact  Pressure then pressure dressing was applied and secured with a coban dressing  Site unremarkable  Discharge instructions reviewed with pt and his sons and understanding verbalized  Copy given then pt was discharged via w/c per his sons in stable condition

## 2019-03-01 NOTE — PROGRESS NOTES
reclast infused and tubing was flushed with NS  Pt reports he needs to go to [facility]  Tubing disconnected  Pt assisted up to w/c per his sons and taken to the [facility]  Pt moses well

## 2019-03-01 NOTE — PROGRESS NOTES
Pt here via w/c per sons for a reclast infusion  Pt reports that he has never had this infusion before Discussed reclast infusion with all parties and questions were answered to their satisfaction  Print out from [facility] given to the sons for them to review  They are aware that is will also be on the discharge papers for today  All are aware that I will keep pt for 15 to 20 mins after the infusion is completed to monitor for any signs of allergic reactions   IV # 22 started in [facility] on 1st attempt without difficulty  Pt moses well  reclast 5 mg IVPB starting to infuse  Pt moses well  Legs elevated  Pt watching TV  Will monitor  Sons remain at side

## 2019-03-02 ENCOUNTER — APPOINTMENT (OUTPATIENT)
Dept: CT IMAGING | Age: 79
End: 2019-03-02
Payer: MEDICARE

## 2019-03-02 ENCOUNTER — HOSPITAL ENCOUNTER (EMERGENCY)
Age: 79
Discharge: HOME | End: 2019-03-02
Attending: EMERGENCY MEDICINE
Payer: MEDICARE

## 2019-03-02 VITALS
RESPIRATION RATE: 18 BRPM | HEIGHT: 67 IN | TEMPERATURE: 98.6 F | OXYGEN SATURATION: 90 % | HEART RATE: 87 BPM | BODY MASS INDEX: 28.25 KG/M2 | SYSTOLIC BLOOD PRESSURE: 115 MMHG | WEIGHT: 180 LBS | DIASTOLIC BLOOD PRESSURE: 62 MMHG

## 2019-03-02 DIAGNOSIS — I71.40 ABDOMINAL ANEURYSM: ICD-10-CM

## 2019-03-02 DIAGNOSIS — R19.7 DIARRHEA, UNSPECIFIED TYPE: ICD-10-CM

## 2019-03-02 DIAGNOSIS — R53.1 GENERAL WEAKNESS: Primary | ICD-10-CM

## 2019-03-02 LAB
A/G RATIO: 1.4 (ref 1.1–2.2)
ALBUMIN SERPL-MCNC: 3.7 G/DL (ref 3.4–5)
ALP BLD-CCNC: 65 U/L (ref 40–129)
ALT SERPL-CCNC: 20 U/L (ref 10–40)
ANION GAP SERPL CALCULATED.3IONS-SCNC: 12 MMOL/L (ref 3–16)
AST SERPL-CCNC: 26 U/L (ref 15–37)
BASOPHILS ABSOLUTE: 0 K/UL (ref 0–0.2)
BASOPHILS RELATIVE PERCENT: 0.4 %
BILIRUB SERPL-MCNC: 0.7 MG/DL (ref 0–1)
BILIRUBIN URINE: NEGATIVE
BLOOD, URINE: NEGATIVE
BUN BLDV-MCNC: 12 MG/DL (ref 7–20)
CALCIUM SERPL-MCNC: 9.1 MG/DL (ref 8.3–10.6)
CHLORIDE BLD-SCNC: 87 MMOL/L (ref 99–110)
CLARITY: CLEAR
CO2: 30 MMOL/L (ref 21–32)
COLOR: YELLOW
CREAT SERPL-MCNC: 0.7 MG/DL (ref 0.8–1.3)
EKG ATRIAL RATE: 64 BPM
EKG DIAGNOSIS: NORMAL
EKG P AXIS: 71 DEGREES
EKG P-R INTERVAL: 202 MS
EKG Q-T INTERVAL: 370 MS
EKG QRS DURATION: 88 MS
EKG QTC CALCULATION (BAZETT): 381 MS
EKG R AXIS: 76 DEGREES
EKG T AXIS: 74 DEGREES
EKG VENTRICULAR RATE: 64 BPM
EOSINOPHILS ABSOLUTE: 0 K/UL (ref 0–0.6)
EOSINOPHILS RELATIVE PERCENT: 0 %
GFR AFRICAN AMERICAN: >60
GFR NON-AFRICAN AMERICAN: >60
GLOBULIN: 2.7 G/DL
GLUCOSE BLD-MCNC: 138 MG/DL (ref 70–99)
GLUCOSE URINE: NEGATIVE MG/DL
HCT VFR BLD CALC: 38.9 % (ref 40.5–52.5)
HEMOGLOBIN: 13.4 G/DL (ref 13.5–17.5)
KETONES, URINE: NEGATIVE MG/DL
LEUKOCYTE ESTERASE, URINE: NEGATIVE
LYMPHOCYTES ABSOLUTE: 0.2 K/UL (ref 1–5.1)
LYMPHOCYTES RELATIVE PERCENT: 2.5 %
MCH RBC QN AUTO: 32 PG (ref 26–34)
MCHC RBC AUTO-ENTMCNC: 34.5 G/DL (ref 31–36)
MCV RBC AUTO: 92.8 FL (ref 80–100)
MICROSCOPIC EXAMINATION: NORMAL
MONOCYTES ABSOLUTE: 0.2 K/UL (ref 0–1.3)
MONOCYTES RELATIVE PERCENT: 1.6 %
NEUTROPHILS ABSOLUTE: 9.4 K/UL (ref 1.7–7.7)
NEUTROPHILS RELATIVE PERCENT: 95.5 %
NITRITE, URINE: NEGATIVE
PDW BLD-RTO: 13.4 % (ref 12.4–15.4)
PH UA: 8 (ref 5–8)
PLATELET # BLD: 174 K/UL (ref 135–450)
PMV BLD AUTO: 6.9 FL (ref 5–10.5)
POTASSIUM SERPL-SCNC: 3.6 MMOL/L (ref 3.5–5.1)
PROTEIN UA: NEGATIVE MG/DL
RAPID INFLUENZA  B AGN: NEGATIVE
RAPID INFLUENZA A AGN: NEGATIVE
RBC # BLD: 4.19 M/UL (ref 4.2–5.9)
SODIUM BLD-SCNC: 129 MMOL/L (ref 136–145)
SPECIFIC GRAVITY UA: <=1.005 (ref 1–1.03)
TOTAL PROTEIN: 6.4 G/DL (ref 6.4–8.2)
URINE REFLEX TO CULTURE: NORMAL
URINE TYPE: NORMAL
UROBILINOGEN, URINE: 0.2 E.U./DL
WBC # BLD: 9.9 K/UL (ref 4–11)

## 2019-03-02 PROCEDURE — 80053 COMPREHEN METABOLIC PANEL: CPT

## 2019-03-02 PROCEDURE — 6360000004 HC RX CONTRAST MEDICATION: Performed by: EMERGENCY MEDICINE

## 2019-03-02 PROCEDURE — 93010 ELECTROCARDIOGRAM REPORT: CPT | Performed by: INTERNAL MEDICINE

## 2019-03-02 PROCEDURE — 81003 URINALYSIS AUTO W/O SCOPE: CPT

## 2019-03-02 PROCEDURE — 87804 INFLUENZA ASSAY W/OPTIC: CPT

## 2019-03-02 PROCEDURE — 2580000003 HC RX 258: Performed by: EMERGENCY MEDICINE

## 2019-03-02 PROCEDURE — 85025 COMPLETE CBC W/AUTO DIFF WBC: CPT

## 2019-03-02 PROCEDURE — 74177 CT ABD & PELVIS W/CONTRAST: CPT

## 2019-03-02 PROCEDURE — 93005 ELECTROCARDIOGRAM TRACING: CPT | Performed by: EMERGENCY MEDICINE

## 2019-03-02 PROCEDURE — 96360 HYDRATION IV INFUSION INIT: CPT

## 2019-03-02 PROCEDURE — 99285 EMERGENCY DEPT VISIT HI MDM: CPT

## 2019-03-02 RX ORDER — 0.9 % SODIUM CHLORIDE 0.9 %
1000 INTRAVENOUS SOLUTION INTRAVENOUS ONCE
Status: COMPLETED | OUTPATIENT
Start: 2019-03-02 | End: 2019-03-02

## 2019-03-02 RX ADMIN — SODIUM CHLORIDE 1000 ML: 9 INJECTION, SOLUTION INTRAVENOUS at 12:17

## 2019-03-02 RX ADMIN — IOPAMIDOL 75 ML: 755 INJECTION, SOLUTION INTRAVENOUS at 11:17

## 2019-03-04 RX ORDER — FINASTERIDE 5 MG/1
5 TABLET, FILM COATED ORAL DAILY
Qty: 30 TABLET | Refills: 11 | Status: SHIPPED | OUTPATIENT
Start: 2019-03-04 | End: 2020-02-20

## 2019-03-11 ENCOUNTER — OFFICE VISIT (OUTPATIENT)
Dept: FAMILY MEDICINE CLINIC | Age: 79
End: 2019-03-11
Payer: MEDICARE

## 2019-03-11 VITALS — HEART RATE: 63 BPM | SYSTOLIC BLOOD PRESSURE: 100 MMHG | DIASTOLIC BLOOD PRESSURE: 60 MMHG | OXYGEN SATURATION: 93 %

## 2019-03-11 DIAGNOSIS — S41.112A LACERATION OF LEFT UPPER EXTREMITY WITH COMPLICATION, INITIAL ENCOUNTER: Primary | ICD-10-CM

## 2019-03-11 PROCEDURE — 1123F ACP DISCUSS/DSCN MKR DOCD: CPT | Performed by: FAMILY MEDICINE

## 2019-03-11 PROCEDURE — G8482 FLU IMMUNIZE ORDER/ADMIN: HCPCS | Performed by: FAMILY MEDICINE

## 2019-03-11 PROCEDURE — 1101F PT FALLS ASSESS-DOCD LE1/YR: CPT | Performed by: FAMILY MEDICINE

## 2019-03-11 PROCEDURE — 1036F TOBACCO NON-USER: CPT | Performed by: FAMILY MEDICINE

## 2019-03-11 PROCEDURE — G8417 CALC BMI ABV UP PARAM F/U: HCPCS | Performed by: FAMILY MEDICINE

## 2019-03-11 PROCEDURE — G8427 DOCREV CUR MEDS BY ELIG CLIN: HCPCS | Performed by: FAMILY MEDICINE

## 2019-03-11 PROCEDURE — 4040F PNEUMOC VAC/ADMIN/RCVD: CPT | Performed by: FAMILY MEDICINE

## 2019-03-11 PROCEDURE — 99213 OFFICE O/P EST LOW 20 MIN: CPT | Performed by: FAMILY MEDICINE

## 2019-03-12 RX ORDER — FLUTICASONE FUROATE AND VILANTEROL 100; 25 UG/1; UG/1
POWDER RESPIRATORY (INHALATION)
Qty: 2 EACH | Refills: 0 | COMMUNITY
Start: 2019-03-12 | End: 2019-03-25 | Stop reason: ALTCHOICE

## 2019-03-25 ENCOUNTER — OFFICE VISIT (OUTPATIENT)
Dept: FAMILY MEDICINE CLINIC | Age: 79
End: 2019-03-25
Payer: MEDICARE

## 2019-03-25 VITALS
SYSTOLIC BLOOD PRESSURE: 128 MMHG | DIASTOLIC BLOOD PRESSURE: 64 MMHG | OXYGEN SATURATION: 97 % | HEART RATE: 56 BPM | BODY MASS INDEX: 27.41 KG/M2 | WEIGHT: 175 LBS

## 2019-03-25 DIAGNOSIS — Z85.118 HISTORY OF LUNG CANCER: ICD-10-CM

## 2019-03-25 DIAGNOSIS — I48.0 PAROXYSMAL ATRIAL FIBRILLATION (HCC): ICD-10-CM

## 2019-03-25 DIAGNOSIS — J43.1 PANLOBULAR EMPHYSEMA (HCC): Primary | ICD-10-CM

## 2019-03-25 DIAGNOSIS — E87.1 HYPONATREMIA: ICD-10-CM

## 2019-03-25 DIAGNOSIS — E78.5 HYPERLIPIDEMIA WITH TARGET LDL LESS THAN 130: ICD-10-CM

## 2019-03-25 DIAGNOSIS — N40.1 BENIGN PROSTATIC HYPERPLASIA WITH LOWER URINARY TRACT SYMPTOMS, SYMPTOM DETAILS UNSPECIFIED: ICD-10-CM

## 2019-03-25 DIAGNOSIS — Z86.73 HISTORY OF CVA (CEREBROVASCULAR ACCIDENT): ICD-10-CM

## 2019-03-25 DIAGNOSIS — I10 ESSENTIAL HYPERTENSION: ICD-10-CM

## 2019-03-25 DIAGNOSIS — R26.81 UNSTEADY GAIT: ICD-10-CM

## 2019-03-25 DIAGNOSIS — R53.1 LEFT-SIDED WEAKNESS: ICD-10-CM

## 2019-03-25 DIAGNOSIS — F32.A DEPRESSION, UNSPECIFIED DEPRESSION TYPE: ICD-10-CM

## 2019-03-25 PROBLEM — C34.32 MALIGNANT NEOPLASM OF BRONCHUS OF LEFT LOWER LOBE (HCC): Status: RESOLVED | Noted: 2019-02-25 | Resolved: 2019-03-25

## 2019-03-25 PROBLEM — C34.32 MALIGNANT NEOPLASM OF LOWER LOBE OF LEFT LUNG (HCC): Status: RESOLVED | Noted: 2017-11-10 | Resolved: 2019-03-25

## 2019-03-25 PROBLEM — I62.9: Status: RESOLVED | Noted: 2018-03-13 | Resolved: 2019-03-25

## 2019-03-25 PROBLEM — I62.9 INTRACRANIAL HEMORRHAGE (HCC): Status: RESOLVED | Noted: 2018-03-09 | Resolved: 2019-03-25

## 2019-03-25 PROCEDURE — G8417 CALC BMI ABV UP PARAM F/U: HCPCS | Performed by: NURSE PRACTITIONER

## 2019-03-25 PROCEDURE — 1101F PT FALLS ASSESS-DOCD LE1/YR: CPT | Performed by: NURSE PRACTITIONER

## 2019-03-25 PROCEDURE — 1036F TOBACCO NON-USER: CPT | Performed by: NURSE PRACTITIONER

## 2019-03-25 PROCEDURE — 1123F ACP DISCUSS/DSCN MKR DOCD: CPT | Performed by: NURSE PRACTITIONER

## 2019-03-25 PROCEDURE — G8427 DOCREV CUR MEDS BY ELIG CLIN: HCPCS | Performed by: NURSE PRACTITIONER

## 2019-03-25 PROCEDURE — G8926 SPIRO NO PERF OR DOC: HCPCS | Performed by: NURSE PRACTITIONER

## 2019-03-25 PROCEDURE — 4040F PNEUMOC VAC/ADMIN/RCVD: CPT | Performed by: NURSE PRACTITIONER

## 2019-03-25 PROCEDURE — G8482 FLU IMMUNIZE ORDER/ADMIN: HCPCS | Performed by: NURSE PRACTITIONER

## 2019-03-25 PROCEDURE — 3023F SPIROM DOC REV: CPT | Performed by: NURSE PRACTITIONER

## 2019-03-25 PROCEDURE — 99215 OFFICE O/P EST HI 40 MIN: CPT | Performed by: NURSE PRACTITIONER

## 2019-03-25 PROCEDURE — 36415 COLL VENOUS BLD VENIPUNCTURE: CPT | Performed by: NURSE PRACTITIONER

## 2019-03-25 RX ORDER — SERTRALINE HYDROCHLORIDE 100 MG/1
TABLET, FILM COATED ORAL
Qty: 30 TABLET | Refills: 2 | Status: SHIPPED | OUTPATIENT
Start: 2019-03-25 | End: 2019-04-17

## 2019-03-25 ASSESSMENT — ENCOUNTER SYMPTOMS
EYES NEGATIVE: 1
ALLERGIC/IMMUNOLOGIC NEGATIVE: 1
GASTROINTESTINAL NEGATIVE: 1
TROUBLE SWALLOWING: 1
SHORTNESS OF BREATH: 1
COUGH: 1

## 2019-03-26 LAB
ANION GAP SERPL CALCULATED.3IONS-SCNC: 15 MMOL/L (ref 3–16)
BUN BLDV-MCNC: 12 MG/DL (ref 7–20)
CALCIUM SERPL-MCNC: 9.6 MG/DL (ref 8.3–10.6)
CHLORIDE BLD-SCNC: 89 MMOL/L (ref 99–110)
CO2: 32 MMOL/L (ref 21–32)
CREAT SERPL-MCNC: 0.6 MG/DL (ref 0.8–1.3)
GFR AFRICAN AMERICAN: >60
GFR NON-AFRICAN AMERICAN: >60
GLUCOSE BLD-MCNC: 104 MG/DL (ref 70–99)
POTASSIUM SERPL-SCNC: 3.5 MMOL/L (ref 3.5–5.1)
SODIUM BLD-SCNC: 136 MMOL/L (ref 136–145)

## 2019-04-17 ENCOUNTER — OFFICE VISIT (OUTPATIENT)
Dept: FAMILY MEDICINE CLINIC | Age: 79
End: 2019-04-17
Payer: MEDICARE

## 2019-04-17 VITALS — OXYGEN SATURATION: 93 % | SYSTOLIC BLOOD PRESSURE: 138 MMHG | HEART RATE: 72 BPM | DIASTOLIC BLOOD PRESSURE: 74 MMHG

## 2019-04-17 DIAGNOSIS — F32.A DEPRESSION, UNSPECIFIED DEPRESSION TYPE: ICD-10-CM

## 2019-04-17 DIAGNOSIS — F51.02 ADJUSTMENT INSOMNIA: Primary | ICD-10-CM

## 2019-04-17 PROCEDURE — 1123F ACP DISCUSS/DSCN MKR DOCD: CPT | Performed by: NURSE PRACTITIONER

## 2019-04-17 PROCEDURE — 99213 OFFICE O/P EST LOW 20 MIN: CPT | Performed by: NURSE PRACTITIONER

## 2019-04-17 PROCEDURE — 4040F PNEUMOC VAC/ADMIN/RCVD: CPT | Performed by: NURSE PRACTITIONER

## 2019-04-17 PROCEDURE — G8427 DOCREV CUR MEDS BY ELIG CLIN: HCPCS | Performed by: NURSE PRACTITIONER

## 2019-04-17 PROCEDURE — 1036F TOBACCO NON-USER: CPT | Performed by: NURSE PRACTITIONER

## 2019-04-17 PROCEDURE — G8417 CALC BMI ABV UP PARAM F/U: HCPCS | Performed by: NURSE PRACTITIONER

## 2019-04-17 RX ORDER — SERTRALINE HYDROCHLORIDE 25 MG/1
TABLET, FILM COATED ORAL
Qty: 7 TABLET | Refills: 0 | Status: SHIPPED | OUTPATIENT
Start: 2019-04-17 | End: 2019-05-08 | Stop reason: ALTCHOICE

## 2019-04-17 RX ORDER — SERTRALINE HYDROCHLORIDE 25 MG/1
TABLET, FILM COATED ORAL
Qty: 7 TABLET | Refills: 0 | Status: SHIPPED | OUTPATIENT
Start: 2019-04-17 | End: 2019-04-17 | Stop reason: SDUPTHER

## 2019-04-17 RX ORDER — TRAZODONE HYDROCHLORIDE 50 MG/1
25 TABLET ORAL NIGHTLY
Qty: 15 TABLET | Refills: 3 | Status: SHIPPED | OUTPATIENT
Start: 2019-04-17 | End: 2019-04-17

## 2019-04-17 RX ORDER — TRAZODONE HYDROCHLORIDE 50 MG/1
25 TABLET ORAL NIGHTLY
Qty: 15 TABLET | Refills: 3 | Status: SHIPPED | OUTPATIENT
Start: 2019-04-17 | End: 2019-06-20 | Stop reason: SDUPTHER

## 2019-04-17 ASSESSMENT — ENCOUNTER SYMPTOMS
EYES NEGATIVE: 1
GASTROINTESTINAL NEGATIVE: 1
ALLERGIC/IMMUNOLOGIC NEGATIVE: 1
RESPIRATORY NEGATIVE: 1

## 2019-04-17 NOTE — PATIENT INSTRUCTIONS
the evening, because it can cause you to wake in the middle of the night. · Do not eat a big meal close to bedtime. If you are hungry, eat a light snack. · Do not drink a lot of water close to bedtime, because the need to urinate may wake you up during the night. · Do not read or watch TV in bed. Use the bed only for sleeping and sexual activity. What to try  · Go to bed at the same time every night, and wake up at the same time every morning. Do not take naps during the day. · Keep your bedroom quiet, dark, and cool. · Sleep on a comfortable pillow and mattress. · If watching the clock makes you anxious, turn it facing away from you so you cannot see the time. · If you worry when you lie down, start a worry book. Well before bedtime, write down your worries, and then set the book and your concerns aside. · Try meditation or other relaxation techniques before you go to bed. · If you cannot fall asleep, get up and go to another room until you feel sleepy. Do something relaxing. Repeat your bedtime routine before you go to bed again. · Make your house quiet and calm about an hour before bedtime. Turn down the lights, turn off the TV, log off the computer, and turn down the volume on music. This can help you relax after a busy day. When should you call for help? Watch closely for changes in your health, and be sure to contact your doctor if:    · Your efforts to improve your sleep do not work.     · Your insomnia gets worse.     · You have been feeling down, depressed, or hopeless or have lost interest in things that you usually enjoy. Where can you learn more? Go to [email or web address] and sign in to your [employer] account. Enter P513 in the [employer] Backlinks box to learn more about \"Insomnia: Care Instructions. \"     If you do not have an account, please click on the \"Sign Up Now\" link. Current as of: June 28, 2018  Content Version: 11.9  © 1264-1875 [employer]. Care instructions adapted under license by [facility] ([facility]). If you have questions about a medical condition or this instruction, always ask your healthcare professional. [address] any warranty or liability for your use of this information. Patient Education        Learning About Sleeping Well  What does sleeping well mean? Sleeping well means getting enough sleep. How much sleep is enough varies among people. The number of hours you sleep is not as important as how you feel when you wake up. If you do not feel refreshed, you probably need more sleep. Another sign of not getting enough sleep is feeling tired during the day. The average total nightly sleep time is 7½ to 8 hours. Healthy adults may need a little more or a little less than this. Why is getting enough sleep important? Getting enough quality sleep is a basic part of good health. When your sleep suffers, your mood and your thoughts can suffer too. You may find yourself feeling more grumpy or stressed. Not getting enough sleep also can lead to serious problems, including injury, accidents, anxiety, and depression. What might cause poor sleeping? Many things can cause sleep problems, including:  · Stress. Stress can be caused by fear about a single event, such as giving a speech. Or you may have ongoing stress, such as worry about work or school. · Depression, anxiety, and other mental or emotional conditions. · Changes in your sleep habits or surroundings. This includes changes that happen where you sleep, such as noise, light, or sleeping in a different bed. It also includes changes in your sleep pattern, such as having jet lag or working a late shift. · Health problems, such as pain, breathing problems, and restless legs syndrome. · Lack of regular exercise. How can you help yourself? Here are some tips that may help you sleep more soundly and wake up feeling more refreshed.   Your sleeping area  · Use your sign in to your [employer] account. Enter G135 in the [facility] box to learn more about \"Learning About Sleeping Well. \"     If you do not have an account, please click on the \"Sign Up Now\" link. Current as of: September 11, 2018  Content Version: 11.9  © 5736-0637 [employer]. Care instructions adapted under license by [facility] [address] If you have questions about a medical condition or this instruction, always ask your healthcare professional. [address] any warranty or liability for your use of this information. Patient Education        trazodone  Pronunciation:  Santosh Granda oh done  Brand:  Oleptro  What is the most important information I should know about trazodone? You should not use trazodone if you are allergic to it, or if you are being treated with methylene blue injection. Do not use trazodone if you have taken an MAO inhibitor in the past 14 days. A dangerous drug interaction could occur. MAO inhibitors include isocarboxazid, linezolid, phenelzine, rasagiline, selegiline, and tranylcypromine. Some young people have thoughts about suicide when first taking an antidepressant. Your doctor will need to check your progress at regular visits while you are using trazodone. Your family or other caregivers should also be alert to changes in your mood or symptoms. Report any new or worsening symptoms to your doctor, such as: mood or behavior changes, anxiety, panic attacks, trouble sleeping, or if you feel impulsive, irritable, agitated, hostile, aggressive, restless, hyperactive (mentally or physically), more depressed, or have thoughts about suicide or hurting yourself. Do not give this medicine to anyone younger than 25years old without the advice of a doctor. Trazodone is not approved for use in children. What is trazodone? Trazodone is an antidepressant medicine.  It affects chemicals in the brain that may be unbalanced in people with depression. Trazodone is used to treat major depressive disorder. Trazodone may also be used for purposes not listed in this medication guide. What should I discuss with my healthcare provider before taking trazodone? You should not use trazodone if you are allergic to it, or if you are being treated with methylene blue injection. Do not use trazodone if you have taken an MAO inhibitor in the past 14 days. A dangerous drug interaction could occur. MAO inhibitors include isocarboxazid, linezolid, phenelzine, rasagiline, selegiline, and tranylcypromine. After you stop taking trazodone, you must wait at least 14 days before you start taking an MAOI. To make sure trazodone is safe for you, tell your doctor if you have:  · liver or kidney disease;  · heart disease;  · a bleeding or blood clotting disorder;  · seizures or epilepsy;  · narrow-angle glaucoma;  · bipolar disorder (manic depression);  · a history of Long QT syndrome;  · a history of drug abuse or suicidal thoughts; or  · if you have recently had a heart attack. Some young people have thoughts about suicide when first taking an antidepressant. Your doctor will need to check your progress at regular visits while you are using trazodone. Your family or other caregivers should also be alert to changes in your mood or symptoms. Taking an SSRI antidepressant during pregnancy may cause serious lung problems or other complications in the baby. However, you may have a relapse of depression if you stop taking your antidepressant. Tell your doctor right away if you become pregnant while taking trazodone. Do not start or stop taking this medicine during pregnancy without your doctor's advice. It is not known whether trazodone passes into breast milk or if it could harm a nursing baby. Tell your doctor if you are breast-feeding a baby. Do not give this medicine to anyone younger than 25years old without the advice of a doctor.  Trazodone is not approved for use in children. How should I take trazodone? Follow all directions on your prescription label. Your doctor may occasionally change your dose to make sure you get the best results. Do not take this medicine in larger or smaller amounts or for longer than recommended. The trazodone immediate-release tablet should be taken after a meal or a snack. Take Oleptro on an empty stomach at bedtime or late in the evening. Do not crush, chew, or break an extended-release tablet. Swallow it whole. You may break an Oleptro tablet in half along the score line if needed. It may take up to 2 weeks before your symptoms improve. Keep using the medication as directed and tell your doctor if your symptoms do not improve. Do not stop using trazodone suddenly, or you could have unpleasant withdrawal symptoms. Ask your doctor how to safely stop using trazodone. Store at room temperature away from moisture, heat, and light. What happens if I miss a dose? Take the missed dose as soon as you remember. Skip the missed dose if it is almost time for your next scheduled dose. Do not  take extra medicine to make up the missed dose. What happens if I overdose? Seek emergency medical attention or call the Poison Help line at 1-728.211.2691. An overdose of trazodone can be fatal when it is taken with alcohol, barbiturates such as phenobarbital, or sedatives such as diazepam (Valium). Overdose symptoms may include extreme drowsiness, vomiting, penis erection that is painful or prolonged, fast or pounding heartbeat, seizure (black-out or convulsions), or breathing that slows or stops. What should I avoid while taking trazodone? Do not drink alcohol. Trazodone can increase the effects of alcohol, which could be dangerous. Avoid getting up too fast from a sitting or lying position, or you may feel dizzy. Get up slowly and steady yourself to prevent a fall. Trazodone may impair your thinking or reactions.  Be careful if you drive or do anything that requires you to be alert. Ask your doctor before taking a nonsteroidal anti-inflammatory drug (NSAID) for pain, arthritis, fever, or swelling. This includes aspirin, ibuprofen (Advil, Motrin), naproxen (Aleve), celecoxib (Celebrex), diclofenac, indomethacin, meloxicam, and others. Using an NSAID with trazodone may cause you to bruise or bleed easily. What are the possible side effects of trazodone? Stop taking trazodone and call your doctor at once if you have a penis erection that is painful or lasts 6 hours or longer. This is a medical emergency and could lead to a serious condition that must be corrected with surgery. Get emergency medical help if you have signs of an allergic reaction:  hives; difficulty breathing; swelling of your face, lips, tongue, or throat. Report any new or worsening symptoms to your doctor, such as: mood or behavior changes, anxiety, panic attacks, trouble sleeping, or if you feel impulsive, irritable, agitated, hostile, aggressive, restless, hyperactive (mentally or physically), more depressed, or have thoughts about suicide or hurting yourself. Call your doctor at once if you have:  · blurred vision, tunnel vision, eye pain or swelling, or seeing halos around lights;  · headache with chest pain and severe dizziness, fainting, fast or pounding heartbeats;  · chest pain or pressure, tight feeling in your neck or jaw, sweating, pain spreading to your arm or shoulder;  · high levels of serotonin in the body --agitation, hallucinations, fever, fast heart rate, overactive reflexes, nausea, vomiting, diarrhea, loss of coordination, fainting;  · low levels of sodium in the body --headache, confusion, slurred speech, severe weakness, vomiting, loss of coordination, feeling unsteady; or  · severe nervous system reaction --very stiff (rigid) muscles, high fever, sweating, confusion, fast or uneven heartbeats, tremors, feeling like you might pass out.   Common side effects may include:  · drowsiness, dizziness;  · vision changes;  · constipation; or  · dry mouth, altered sense of taste. This is not a complete list of side effects and others may occur. Call your doctor for medical advice about side effects. You may report side effects to FDA at 2-410-GFK-2943. What other drugs will affect trazodone? Taking this medicine with other drugs that make you sleepy can worsen this effect. Ask your doctor before taking trazodone with a sleeping pill, narcotic pain medicine, muscle relaxer, or medicine for anxiety, depression, or seizures. Many drugs can interact with trazodone. Not all possible interactions are listed here. Tell your doctor about all your medications and any you start or stop using during treatment with trazodone, especially:  · any other antidepressant;  · anagrelide;  · droperidol;  · methadone;  · ondansetron;  · an antibiotic --azithromycin, clarithromycin, erythromycin, levofloxacin, moxifloxacin, pentamidine;  · cancer medicine --arsenic trioxide, vandetanib;  · anti-malaria medication --chloroquine, halofantrine;  · heart rhythm medicine --amiodarone, disopyramide, dofetilide, dronedarone, flecainide, ibutilide, quinidine, sotalol; or  · medicine to treat a psychiatric disorder --chlorpromazine, haloperidol, pimozide, thioridazine. This list is not complete and many other drugs can interact with trazodone. This includes prescription and over-the-counter medicines, vitamins, and herbal products. Give a list of all your medicines to any healthcare provider who treats you. Where can I get more information? Your pharmacist can provide more information about trazodone. Remember, keep this and all other medicines out of the reach of children, never share your medicines with others, and use this medication only for the indication prescribed.   Every effort has been made to ensure that the information provided by [address] is accurate, up-to-date, and complete, but no guarantee is made to that effect. Drug information contained herein may be time sensitive. [employer] information has been compiled for use by healthcare practitioners and consumers in the [place] and therefore [employer] does not warrant that uses outside of the [place] are appropriate, unless specifically indicated otherwise. [facility]'s drug information does not endorse drugs, diagnose patients or recommend therapy. [facility] drug information is an informational resource designed to assist licensed healthcare practitioners in caring for their patients and/or to serve consumers viewing this service as a supplement to, and not a substitute for, the expertise, skill, knowledge and judgment of healthcare practitioners. The absence of a warning for a given drug or drug combination in no way should be construed to indicate that the drug or drug combination is safe, effective or appropriate for any given patient. [facility] does not assume any responsibility for any aspect of healthcare administered with the aid of information [facility] provides. The information contained herein is not intended to cover all possible uses, directions, precautions, warnings, drug interactions, allergic reactions, or adverse effects. If you have questions about the drugs you are taking, check with your doctor, nurse or pharmacist.  Copyright 8826-2264 [address]: 9.03. Revision date: 9/25/2015. Care instructions adapted under license by [facility] ([facility]). If you have questions about a medical condition or this instruction, always ask your healthcare professional. [place] [address] any warranty or liability for your use of this information.

## 2019-04-17 NOTE — PROGRESS NOTES
TAKE (1) TABLET DAILY 30 tablet 5    lisinopril (PRINIVIL;ZESTRIL) 10 MG tablet TAKE (1) TABLET DAILY 30 tablet 5    metoprolol tartrate (LOPRESSOR) 25 MG tablet TAKE 1 TABLET TWICE DAILY 60 tablet 5    pantoprazole (PROTONIX) 40 MG tablet TAKE (1) TABLET DAILY 30 tablet 5    tamsulosin (FLOMAX) 0.4 MG capsule TAKE 2 CAPSULES AT BEDTIME 60 capsule 5    aspirin 81 MG tablet Take 81 mg by mouth daily      fluticasone-vilanterol (BREO ELLIPTA) 100-25 MCG/INH AEPB inhaler INHALE 1 PUFF DAILY 1 each 5    fluticasone (FLONASE) 50 MCG/ACT nasal spray USE 2 SPRAYS INTO EACH NOSTRIL DAILY 16 g 5    polyethylene glycol (GLYCOLAX) powder Take 17 g by mouth daily      acetaminophen (TYLENOL) 325 MG tablet Take 2 tablets by mouth every 4 hours as needed for Fever (Fever >100.5 (38 C), pain) 120 tablet 3    potassium chloride (KLOR-CON M) 20 MEQ extended release tablet Take 1 tablet by mouth daily (with breakfast) 60 tablet 3    lactulose (CHRONULAC) 10 GM/15ML solution Take 15 mLs by mouth every evening 240 mL 0       No Known Allergies    Social History     Tobacco Use    Smoking status: Former Smoker     Packs/day: 1.00     Years: 52.00     Pack years: 52.00     Types: Cigarettes     Last attempt to quit: 2010     Years since quittin.9    Smokeless tobacco: Never Used   Substance Use Topics    Alcohol use: Not Currently     Alcohol/week: 0.0 oz     Comment: occasionally       Objective:   /74   Pulse 72   SpO2 93%     Physical Exam   Constitutional: He is oriented to person, place, and time. He appears well-developed and well-nourished. HENT:   Head: Normocephalic and atraumatic. Eyes: Conjunctivae and EOM are normal.   Neck: Neck supple. Cardiovascular: Normal rate, regular rhythm, normal heart sounds and intact distal pulses. Pulmonary/Chest: Effort normal and breath sounds normal.   Abdominal: Soft.  Bowel sounds are normal.   Neurological: He is alert and oriented to person, place, and time.   Skin: Skin is warm and dry. No rash noted. Psychiatric: His speech is normal and behavior is normal. Thought content is not paranoid and not delusional. He exhibits a depressed mood. He expresses no homicidal and no suicidal ideation. He expresses no suicidal plans and no homicidal plans. Assessment/Plan:   1. Adjustment insomnia  Patient presents today with complaints of difficulties sleeping over the past few weeks that has progressively worsened over the past few days. Patient also reports negative thoughts and is concerned that he may be having adverse effects from the Zoloft. Patient denies any homicidal or suicidal ideations. Commend patient reduce sertraline to 50 mg by mouth daily ×1 week and 25 mg by mouth daily times daily ×1 week then discontinue. Discussed other options for sleep and recommend patient trialed trazodone 25 mg by mouth at at bedtime. Discussed possible side effects. Encouraged to call with any questions or concerns. Patient's last white verbalized understanding and agreeable to plan. Also see patient instructions. - traZODone (DESYREL) 50 MG tablet; Take 0.5 tablets by mouth nightly  Dispense: 15 tablet; Refill: 3  - sertraline (ZOLOFT) 25 MG tablet; after completing the week of 50 mg by mouth daily then start  TAKE (1) TABLET DAILY X 7 days then discontinue  Dispense: 7 tablet; Refill: 0    2. Depression, unspecified depression type  See note above. - sertraline (ZOLOFT) 25 MG tablet; after completing the week of 50 mg by mouth daily then start  TAKE (1) TABLET DAILY X 7 days then discontinue  Dispense: 7 tablet;  Refill: 0

## 2019-04-25 RX ORDER — ALBUTEROL SULFATE 90 UG/1
AEROSOL, METERED RESPIRATORY (INHALATION)
Qty: 1 INHALER | Refills: 5 | Status: SHIPPED | OUTPATIENT
Start: 2019-04-25 | End: 2019-09-24 | Stop reason: SDUPTHER

## 2019-05-08 ENCOUNTER — OFFICE VISIT (OUTPATIENT)
Dept: FAMILY MEDICINE CLINIC | Age: 79
End: 2019-05-08
Payer: MEDICARE

## 2019-05-08 VITALS
WEIGHT: 177 LBS | BODY MASS INDEX: 27.72 KG/M2 | OXYGEN SATURATION: 93 % | DIASTOLIC BLOOD PRESSURE: 70 MMHG | HEART RATE: 64 BPM | SYSTOLIC BLOOD PRESSURE: 132 MMHG

## 2019-05-08 DIAGNOSIS — F33.1 MODERATE EPISODE OF RECURRENT MAJOR DEPRESSIVE DISORDER (HCC): ICD-10-CM

## 2019-05-08 DIAGNOSIS — R53.83 FATIGUE, UNSPECIFIED TYPE: Primary | ICD-10-CM

## 2019-05-08 PROCEDURE — G8427 DOCREV CUR MEDS BY ELIG CLIN: HCPCS | Performed by: NURSE PRACTITIONER

## 2019-05-08 PROCEDURE — 96372 THER/PROPH/DIAG INJ SC/IM: CPT | Performed by: NURSE PRACTITIONER

## 2019-05-08 PROCEDURE — 36415 COLL VENOUS BLD VENIPUNCTURE: CPT | Performed by: NURSE PRACTITIONER

## 2019-05-08 PROCEDURE — 1036F TOBACCO NON-USER: CPT | Performed by: NURSE PRACTITIONER

## 2019-05-08 PROCEDURE — G8417 CALC BMI ABV UP PARAM F/U: HCPCS | Performed by: NURSE PRACTITIONER

## 2019-05-08 PROCEDURE — 4040F PNEUMOC VAC/ADMIN/RCVD: CPT | Performed by: NURSE PRACTITIONER

## 2019-05-08 PROCEDURE — 1123F ACP DISCUSS/DSCN MKR DOCD: CPT | Performed by: NURSE PRACTITIONER

## 2019-05-08 PROCEDURE — 99214 OFFICE O/P EST MOD 30 MIN: CPT | Performed by: NURSE PRACTITIONER

## 2019-05-08 RX ORDER — ESCITALOPRAM OXALATE 10 MG/1
10 TABLET ORAL DAILY
Qty: 30 TABLET | Refills: 3 | Status: SHIPPED | OUTPATIENT
Start: 2019-05-08 | End: 2019-08-22 | Stop reason: SDUPTHER

## 2019-05-08 RX ORDER — CYANOCOBALAMIN 1000 UG/ML
1000 INJECTION INTRAMUSCULAR; SUBCUTANEOUS ONCE
Status: COMPLETED | OUTPATIENT
Start: 2019-05-08 | End: 2019-05-08

## 2019-05-08 RX ADMIN — CYANOCOBALAMIN 1000 MCG: 1000 INJECTION INTRAMUSCULAR; SUBCUTANEOUS at 15:24

## 2019-05-08 ASSESSMENT — ENCOUNTER SYMPTOMS
EYES NEGATIVE: 1
RESPIRATORY NEGATIVE: 1
GASTROINTESTINAL NEGATIVE: 1

## 2019-05-08 NOTE — PATIENT INSTRUCTIONS
Please read the healthy family handout that you were given and share it with your family. Please compare this printed medication list with your medications at home to be sure they are the same. If you have any medications that are different please contact us immediately at [phone]. Also review your allergies that we have listed, these may also include medications that you have not been able to tolerate, make sure everything listed is correct. If you have any allergies that are different please contact us immediately at [phone]. Patient Education        Depression Treatment: Care Instructions  Your Care Instructions    Depression is a condition that affects the way you feel, think, and act. It causes symptoms such as low energy, loss of interest in daily activities, and sadness or grouchiness that goes on for a long time. Depression is very common and affects men and women of all ages. Depression is a medical illness caused by changes in the natural chemicals in your brain. It is not a character flaw, and it does not mean that you are a bad or weak person. It does not mean that you are going crazy. It is important to know that depression can be treated. Medicines, counseling, and self-care can all help. Many people do not get help because they are embarrassed or think that they will get over the depression on their own. But some people do not get better without treatment. Follow-up care is a key part of your treatment and safety. Be sure to make and go to all appointments, and call your doctor if you are having problems. It's also a good idea to know your test results and keep a list of the medicines you take. How can you care for yourself at home? Learn about antidepressant medicines  Antidepressant medicines can improve or end the symptoms of depression. You may need to take the medicine for at least 6 months, and often longer. Keep taking your medicine even if you feel better.  If you stop doctor has not prescribed for you. They may interfere with your treatment. · Spend time with family and friends. It may help to speak openly about your depression with people you trust.  · Take your medicines exactly as prescribed. Call your doctor if you think you are having a problem with your medicine. · Do not make major life decisions while you are depressed. Depression may change the way you think. You will be able to make better decisions after you feel better. · Think positively. Challenge negative thoughts with statements such as \"I am hopeful\"; \"Things will get better\"; and \"I can ask for the help I need. \" Write down these statements and read them often, even if you don't believe them yet. · Be patient with yourself. It took time for your depression to develop, and it will take time for your symptoms to improve. Do not take on too much or be too hard on yourself. · Learn all you can about depression from written and online materials. · Check out behavioral health classes to learn more about dealing with depression. · Keep the numbers for these national suicide hotlines: 5-895-288-TALK (0-299.328.8992) and 4-462-JEOLISW (8-755.576.9216). If you or someone you know talks about suicide or feeling hopeless, get help right away. When should you call for help? Call 911 anytime you think you may need emergency care. For example, call if:    · You feel you cannot stop from hurting yourself or someone else.   [facility] your doctor now or seek immediate medical care if:    · You hear voices.     · You feel much more depressed.    Watch closely for changes in your health, and be sure to contact your doctor if:    · You are having problems with your depression medicine.     · You are not getting better as expected. Where can you learn more? Go to [email or web address]://[email or web address].[email or web address] org and sign in to your CircuLite account.  Enter Q004 in the Minka box to learn more about \"Depression Treatment: Care Instructions. \"     If you do not have an account, please click on the \"Sign Up Now\" link. Current as of: September 11, 2018  Content Version: 12.0  © 5916-8120 [employer]. Care instructions adapted under license by [facility] [address] If you have questions about a medical condition or this instruction, always ask your healthcare professional. [address] any warranty or liability for your use of this information. Patient Education        escitalopram  Pronunciation:  ADRY sterling  Brand:  Lexapro  What is the most important information I should know about escitalopram?  You should not use this medicine you also take pimozide (Orap) or citalopram (Celexa). Do not use escitalopram within 14 days before or 14 days after you have used an MAO inhibitor, such as isocarboxazid, linezolid, methylene blue injection, phenelzine, rasagiline, selegiline, or tranylcypromine. Some young people have thoughts about suicide when first taking an antidepressant. Stay alert to changes in your mood or symptoms. Report any new or worsening symptoms to your doctor. Seek medical attention right away if you have symptoms of serotonin syndrome, such as: agitation, hallucinations, fever, sweating, shivering, fast heart rate, muscle stiffness, twitching, loss of coordination, nausea, vomiting, or diarrhea. Do not give this medicine to anyone under 12 years. What is escitalopram?  Escitalopram is an antidepressant in a group of drugs called selective serotonin reuptake inhibitors (SSRIs). Escitalopram affects chemicals in the brain that may be unbalanced in people with depression or anxiety. Escitalopram is used to treat anxiety in adults. Escitalopram is also used to treat major depressive disorder in adults and adolescents who are at least 15years old. Escitalopram may also be used for purposes not listed in this medication guide.   What should I discuss with my healthcare provider before taking escitalopram?  You should not use this medicine if you are allergic to escitalopram or citalopram (Celexa), or if:  · you also take pimozide or citalopram.  Do not use escitalopram within 14 days before or 14 days after you have used an MAO inhibitor. A dangerous drug interaction could occur. MAO inhibitors include isocarboxazid, linezolid, phenelzine, rasagiline, selegiline, and tranylcypromine. Some medicines can interact with escitalopram and cause a serious condition called serotonin syndrome. Be sure your doctor knows if you also take stimulant medicine, opioid medicine, herbal products, or medicine for depression, mental illness, Parkinson's disease, migraine headaches, serious infections, or prevention of nausea and vomiting. Ask your doctor before making any changes in how or when you take your medications. To make sure escitalopram is safe for you, tell your doctor if you have ever had:  · liver or kidney disease;  · seizures;  · low levels of sodium in your blood;  · heart disease, high blood pressure;  · a stroke;  · a bleeding or blood clotting disorder;  · bipolar disorder (manic depression); or  · drug addiction or suicidal thoughts. Some young people have thoughts about suicide when first taking an antidepressant. Your doctor should check your progress at regular visits. Your family or other caregivers should also be alert to changes in your mood or symptoms. Taking an SSRI antidepressant during pregnancy may cause serious lung problems or other complications in the baby. However, you may have a relapse of depression if you stop taking your antidepressant. Tell your doctor right away if you become pregnant while taking escitalopram. Do not start or stop taking this medicine during pregnancy without your doctor's advice. Escitalopram can pass into breast milk and may harm a nursing baby. Tell your doctor if you are breast-feeding a baby.   Escitalopram should not be given to a child younger than 15years old. How should I take escitalopram?  Follow all directions on your prescription label. Your doctor may occasionally change your dose. Do not take this medicine in larger or smaller amounts or for longer than recommended. You may take escitalopram with or without food. Try to take the medicine at the same time each day. Measure liquid medicine with the dosing syringe provided, or with a special dose-measuring spoon or medicine cup. If you do not have a dose-measuring device, ask your pharmacist for one. It may take up to 4 weeks before your symptoms improve. Keep using the medication as directed and tell your doctor if your symptoms do not improve. Do not stop using escitalopram suddenly, or you could have unpleasant withdrawal symptoms. Follow your doctor's instructions about tapering your dose. Store at room temperature away from moisture and heat. What happens if I miss a dose? Take the missed dose as soon as you remember. Skip the missed dose if it is almost time for your next scheduled dose. Do not take extra medicine to make up the missed dose. What happens if I overdose? Seek emergency medical attention or call the Poison Help line at 1-131.592.6981. What should I avoid while taking escitalopram?  Ask your doctor before taking a nonsteroidal anti-inflammatory drug (NSAID) for pain, arthritis, fever, or swelling. This includes aspirin, ibuprofen (Advil, Motrin), naproxen (Aleve), celecoxib (Celebrex), diclofenac, indomethacin, meloxicam, and others. Using an NSAID with escitalopram may cause you to bruise or bleed easily. Drinking alcohol with this medicine can cause side effects. Escitalopram may impair your thinking or reactions. Be careful if you drive or do anything that requires you to be alert.   What are the possible side effects of escitalopram?  Get emergency medical help if you have signs of an allergic reaction: skin rash or hives; difficulty breathing; swelling of your face, lips, tongue, or throat. Report any new or worsening symptoms to your doctor, such as: mood or behavior changes, anxiety, panic attacks, trouble sleeping, or if you feel impulsive, irritable, agitated, hostile, aggressive, restless, hyperactive (mentally or physically), more depressed, or have thoughts about suicide or hurting yourself. Call your doctor at once if you have:  · blurred vision, tunnel vision, eye pain or swelling, or seeing halos around lights;  · racing thoughts, unusual risk-taking behavior, feelings of extreme happiness or sadness;  · low levels of sodium in the body --headache, confusion, slurred speech, severe weakness, vomiting, loss of coordination, feeling unsteady; or  · severe nervous system reaction --very stiff (rigid) muscles, high fever, sweating, confusion, fast or uneven heartbeats, tremors, feeling like you might pass out. Seek medical attention right away if you have symptoms of serotonin syndrome, such as: agitation, hallucinations, fever, sweating, shivering, fast heart rate, muscle stiffness, twitching, loss of coordination, nausea, vomiting, or diarrhea. Common side effects may include:  · dizziness, drowsiness, weakness;  · sweating, feeling shaky or anxious;  · sleep problems (insomnia);  · dry mouth, loss of appetite;  · nausea, constipation;  · yawning;  · weight changes; or  · decreased sex drive, impotence, or difficulty having an orgasm. This is not a complete list of side effects and others may occur. Call your doctor for medical advice about side effects. You may report side effects to FDA at 3-897-FDA-1455. What other drugs will affect escitalopram?  Taking escitalopram with other drugs that make you sleepy can worsen this effect. Ask your doctor before taking a sleeping pill, narcotic medication, muscle relaxer, or medicine for anxiety, depression, or seizures.   Tell your doctor about all medicines you use, and those you start or stop using during your treatment with escitalopram, especially:  · any other antidepressant;  · medicine to treat anxiety, mood disorders, or mental illness;  · lithium, [place]'s wort, tramadol, or tryptophan (sometimes called L-tryptophan);  · a blood thinner --warfarin, Coumadin, Jantoven;  · migraine headache medication --sumatriptan, rizatriptan, and others;  · narcotic pain medication --fentanyl or tramadol; or  · stimulants or ADHD medication --Adderall, Concerta, Ritalin, and others; This list is not complete. Other drugs may interact with escitalopram, including prescription and over-the-counter medicines, vitamins, and herbal products. Not all possible interactions are listed in this medication guide. Where can I get more information? Your pharmacist can provide more information about escitalopram.  Remember, keep this and all other medicines out of the reach of children, never share your medicines with others, and use this medication only for the indication prescribed. Every effort has been made to ensure that the information provided by [address] is accurate, up-to-date, and complete, but no guarantee is made to that effect. Drug information contained herein may be time sensitive. [facility] information has been compiled for use by healthcare practitioners and consumers in the [place] and therefore [facility] does not warrant that uses outside of the [place] are appropriate, unless specifically indicated otherwise. [facility] drug information does not endorse drugs, diagnose patients or recommend therapy. [facility] drug information is an informational resource designed to assist licensed healthcare practitioners in caring for their patients and/or to serve consumers viewing this service as a supplement to, and not a substitute for, the expertise, skill, knowledge and judgment of healthcare practitioners.  The absence of a warning for a given drug or drug combination in no way should be medications as directed. To treat pernicious anemia, you may have to use cyanocobalamin for the rest of your life. Do not stop using the medicine unless your doctor tells you to. Untreated vitamin B12 deficiency can lead to a recurrence of anemia and irreversible nerve damage. Store this medication at room temperature away from moisture, heat, and light. What happens if I miss a dose? Call your doctor for instructions if you miss a dose. What happens if I overdose? Seek emergency medical attention or call the Poison Help line at 1-904.438.9326. What should I avoid while using cyanocobalamin injection? Avoid drinking large amounts of alcohol while you are being treated with cyanocobalamin. What are the possible side effects of cyanocobalamin injection? Get emergency medical help if you have any of these signs of an allergic reaction:  hives; difficulty breathing; swelling of your face, lips, tongue, or throat. Call your doctor at once if you have:  · numbness or tingling in your hands or feet;  · signs of fluid build-up around your lungs --anxiety, sweating, pale skin, severe shortness of breath, wheezing, gasping for breath, cough with foamy mucus, chest pain; or  · signs of low potassium --confusion, uneven heart rate, extreme thirst, increased urination, leg discomfort, muscle weakness or limp feeling. Common side effects may include:  · swelling, rapid weight gain;  · diarrhea; or  · itching or mild rash. This is not a complete list of side effects and others may occur. Call your doctor for medical advice about side effects. You may report side effects to FDA at 7-962-DUJ-5906. What other drugs will affect cyanocobalamin injection? Other drugs may interact with cyanocobalamin, including prescription and over-the-counter medicines, vitamins, and herbal products. Tell each of your health care providers about all medicines you use now and any medicine you start or stop using.   Where can I get more information? Your pharmacist can provide more information about cyanocobalamin injection. Remember, keep this and all other medicines out of the reach of children, never share your medicines with others, and use this medication only for the indication prescribed. Every effort has been made to ensure that the information provided by [address] is accurate, up-to-date, and complete, but no guarantee is made to that effect. Drug information contained herein may be time sensitive. [facility] information has been compiled for use by healthcare practitioners and consumers in the [place] and therefore [facility] does not warrant that uses outside of the [place] are appropriate, unless specifically indicated otherwise. [facility]'s drug information does not endorse drugs, diagnose patients or recommend therapy. [facility] drug information is an informational resource designed to assist licensed healthcare practitioners in caring for their patients and/or to serve consumers viewing this service as a supplement to, and not a substitute for, the expertise, skill, knowledge and judgment of healthcare practitioners. The absence of a warning for a given drug or drug combination in no way should be construed to indicate that the drug or drug combination is safe, effective or appropriate for any given patient. [facility] does not assume any responsibility for any aspect of healthcare administered with the aid of information [facility] provides. The information contained herein is not intended to cover all possible uses, directions, precautions, warnings, drug interactions, allergic reactions, or adverse effects. If you have questions about the drugs you are taking, check with your doctor, nurse or pharmacist.  Copyright 4983-4235 [place] [address] [place] [address] Avenue: 2.01. Revision date: 2/27/2014. Care instructions adapted under license by [facility] ([facility]).  If you have questions about a medical condition or this instruction, always ask your healthcare professional. [place] [address] any warranty or liability for your use of this information. Patient Education        Fatigue: Care Instructions  Your Care Instructions    Fatigue is a feeling of tiredness, exhaustion, or lack of energy. You may feel fatigue because of too much or not enough activity. It can also come from stress, lack of sleep, boredom, and poor diet. Many medical problems, such as viral infections, can cause fatigue. Emotional problems, especially depression, are often the cause of fatigue. Fatigue is most often a symptom of another problem. Treatment for fatigue depends on the cause. For example, if you have fatigue because you have a certain health problem, treating this problem also treats your fatigue. If depression or anxiety is the cause, treatment may help. Follow-up care is a key part of your treatment and safety. Be sure to make and go to all appointments, and call your doctor if you are having problems. It's also a good idea to know your test results and keep a list of the medicines you take. How can you care for yourself at home? · Get regular exercise. But don't overdo it. Go back and forth between rest and exercise. · Get plenty of rest.  · Eat a healthy diet. Do not skip meals, especially breakfast.  · Reduce your use of caffeine, tobacco, and alcohol. Caffeine is most often found in coffee, tea, cola drinks, and chocolate. · Limit medicines that can cause fatigue. This includes tranquilizers and cold and allergy medicines. When should you call for help? Watch closely for changes in your health, and be sure to contact your doctor if:    · You have new symptoms such as fever or a rash.     · Your fatigue gets worse.     · You have been feeling down, depressed, or hopeless. Or you may have lost interest in things that you usually enjoy.     · You are not getting better as expected. Where can you learn more?   Go to [email or web address] and sign in to your Swallow Solutionshart account. Enter R871 in the SeeFuturehire box to learn more about \"Fatigue: Care Instructions. \"     If you do not have an account, please click on the \"Sign Up Now\" link. Current as of: September 23, 2018  Content Version: 12.0  © 6309-3718 Healthwise, [facility]. Care instructions adapted under license by [facility] ([facility]). If you have questions about a medical condition or this instruction, always ask your healthcare professional. [address] any warranty or liability for your use of this information.

## 2019-05-08 NOTE — PROGRESS NOTES
evening 240 mL 0       No Known Allergies    Social History     Tobacco Use    Smoking status: Former Smoker     Packs/day: 1.00     Years: 52.00     Pack years: 52.00     Types: Cigarettes     Last attempt to quit: 2010     Years since quittin.0    Smokeless tobacco: Never Used   Substance Use Topics    Alcohol use: Not Currently     Alcohol/week: 0.0 oz     Comment: occasionally       Objective:   /70   Pulse 64   SpO2 93%     Physical Exam   Constitutional: He is oriented to person, place, and time. He appears well-developed and well-nourished. HENT:   Head: Normocephalic and atraumatic. Eyes: Conjunctivae and EOM are normal.   Neck: Neck supple. Cardiovascular: Normal rate, regular rhythm, normal heart sounds and intact distal pulses. Pulmonary/Chest: Effort normal and breath sounds normal.   Abdominal: Soft. Bowel sounds are normal.   Neurological: He is alert and oriented to person, place, and time. Skin: Skin is warm and dry. No rash noted. Psychiatric: He has a normal mood and affect. Assessment/Plan:   1. Fatigue, unspecified type  Patient presents today with complaints of daytime fatigue despite that he is sleeping significantly better with trazodone. Patient reports he does wake up occasionally however typically is able to fall back asleep fairly quickly. Discussed other possible causes of fatigue including anemia as, thyroid disease, vitamin D deficiency and depression to name a few. I suspect depression is a significant contributing factor. Recommend labs as below and discussed other options for treatment of depression. Recommend Lexapro 10 mg by mouth daily. Discussed possible side effects/adverse reactions. Advised patient to follow-up in office in 6 weeks or sooner with no improvement or worsening of symptoms.   At least 25 minutes of face-to-face time with patient and more than 50% of this time was dedicated to counseling patient on fatigue/depression and treatment/management. - CBC Auto Differential  - VITAMIN B12 & FOLATE  - TSH with Reflex  - VITAMIN D 25 HYDROXY  - escitalopram (LEXAPRO) 10 MG tablet; Take 1 tablet by mouth daily  Dispense: 30 tablet; Refill: 3  - cyanocobalamin injection 1,000 mcg    2. Moderate episode of recurrent major depressive disorder ([facility] [address]  See note above. - escitalopram (LEXAPRO) 10 MG tablet; Take 1 tablet by mouth daily  Dispense: 30 tablet;  Refill: 3

## 2019-05-09 LAB
BASOPHILS ABSOLUTE: 0 K/UL (ref 0–0.2)
BASOPHILS RELATIVE PERCENT: 0.4 %
EOSINOPHILS ABSOLUTE: 0.2 K/UL (ref 0–0.6)
EOSINOPHILS RELATIVE PERCENT: 3.1 %
FOLATE: >20 NG/ML (ref 4.78–24.2)
HCT VFR BLD CALC: 41.7 % (ref 40.5–52.5)
HEMOGLOBIN: 14.3 G/DL (ref 13.5–17.5)
LYMPHOCYTES ABSOLUTE: 1.5 K/UL (ref 1–5.1)
LYMPHOCYTES RELATIVE PERCENT: 24.3 %
MCH RBC QN AUTO: 32.2 PG (ref 26–34)
MCHC RBC AUTO-ENTMCNC: 34.3 G/DL (ref 31–36)
MCV RBC AUTO: 94 FL (ref 80–100)
MONOCYTES ABSOLUTE: 0.5 K/UL (ref 0–1.3)
MONOCYTES RELATIVE PERCENT: 7.4 %
NEUTROPHILS ABSOLUTE: 4 K/UL (ref 1.7–7.7)
NEUTROPHILS RELATIVE PERCENT: 64.8 %
PDW BLD-RTO: 13.9 % (ref 12.4–15.4)
PLATELET # BLD: 187 K/UL (ref 135–450)
PMV BLD AUTO: 7.4 FL (ref 5–10.5)
RBC # BLD: 4.44 M/UL (ref 4.2–5.9)
TSH REFLEX: 1.02 UIU/ML (ref 0.27–4.2)
VITAMIN B-12: >2000 PG/ML (ref 211–911)
VITAMIN D 25-HYDROXY: 33.5 NG/ML
WBC # BLD: 6.2 K/UL (ref 4–11)

## 2019-05-24 ENCOUNTER — TELEPHONE (OUTPATIENT)
Dept: FAMILY MEDICINE CLINIC | Age: 79
End: 2019-05-24

## 2019-06-05 ENCOUNTER — OFFICE VISIT (OUTPATIENT)
Dept: FAMILY MEDICINE CLINIC | Age: 79
End: 2019-06-05
Payer: MEDICARE

## 2019-06-05 VITALS — HEART RATE: 140 BPM | OXYGEN SATURATION: 94 % | SYSTOLIC BLOOD PRESSURE: 131 MMHG | DIASTOLIC BLOOD PRESSURE: 74 MMHG

## 2019-06-05 DIAGNOSIS — S29.012A MUSCLE STRAIN OF RIGHT UPPER BACK, INITIAL ENCOUNTER: Primary | ICD-10-CM

## 2019-06-05 PROCEDURE — G8417 CALC BMI ABV UP PARAM F/U: HCPCS | Performed by: NURSE PRACTITIONER

## 2019-06-05 PROCEDURE — 1036F TOBACCO NON-USER: CPT | Performed by: NURSE PRACTITIONER

## 2019-06-05 PROCEDURE — 4040F PNEUMOC VAC/ADMIN/RCVD: CPT | Performed by: NURSE PRACTITIONER

## 2019-06-05 PROCEDURE — 99213 OFFICE O/P EST LOW 20 MIN: CPT | Performed by: NURSE PRACTITIONER

## 2019-06-05 PROCEDURE — G8427 DOCREV CUR MEDS BY ELIG CLIN: HCPCS | Performed by: NURSE PRACTITIONER

## 2019-06-05 PROCEDURE — 1123F ACP DISCUSS/DSCN MKR DOCD: CPT | Performed by: NURSE PRACTITIONER

## 2019-06-05 RX ORDER — TIZANIDINE 2 MG/1
2 TABLET ORAL 3 TIMES DAILY PRN
Qty: 30 TABLET | Refills: 0 | Status: SHIPPED | OUTPATIENT
Start: 2019-06-05 | End: 2019-06-15

## 2019-06-05 RX ORDER — TRAMADOL HYDROCHLORIDE 50 MG/1
50 TABLET ORAL EVERY 6 HOURS PRN
Qty: 28 TABLET | Refills: 0 | Status: SHIPPED | OUTPATIENT
Start: 2019-06-05 | End: 2019-06-12

## 2019-06-05 ASSESSMENT — ENCOUNTER SYMPTOMS
COUGH: 1
EYES NEGATIVE: 1
GASTROINTESTINAL NEGATIVE: 1
WHEEZING: 0
SHORTNESS OF BREATH: 0
BACK PAIN: 1

## 2019-06-05 NOTE — PROGRESS NOTES
Subjective:      Patient ID: Shadi Priest is a 70 y.o. male. HPI    Chief Complaint   Patient presents with    Other     right sided pain in his lung     Back Pain  Patient presents for evaluation of low back problems. Symptoms have been present for 4 days and include pain in right mid back (aching and sharp in character; 10/10 in severity). Initial inciting event: none. Symptoms are worse bending and deep breathing. Alleviating factors identifiable by the patient are rest. Aggravating factors identifiable by the patient are as previously noted. Treatments initiated by the patient: none. Previous lower back problems: none. Previous work up: none. Previous treatments: none. Review of Systems   Constitutional: Negative for appetite change, chills and fever. HENT: Negative. Eyes: Negative. Respiratory: Positive for cough (occasional ). Negative for shortness of breath and wheezing. Cardiovascular: Negative. Gastrointestinal: Negative. Endocrine: Negative. Musculoskeletal: Positive for back pain. Skin: Negative. Neurological: Negative. Psychiatric/Behavioral: Negative. Negative for sleep disturbance.        Patient Active Problem List   Diagnosis    BPH (benign prostatic hyperplasia)    Hyperlipidemia with target LDL less than 130    Hypertension    COPD (chronic obstructive pulmonary disease) (HCC)    Colon polyps    Paroxysmal atrial fibrillation (HCC)    Vertebral compression fracture (HCC)    Dysphagia    History of CVA (cerebrovascular accident)    History of lung cancer       Outpatient Medications Marked as Taking for the 6/5/19 encounter (Office Visit) with SONAL Munoz CNP   Medication Sig Dispense Refill    escitalopram (LEXAPRO) 10 MG tablet Take 1 tablet by mouth daily 30 tablet 3    albuterol sulfate  (90 Base) MCG/ACT inhaler INHALE 2 PUFFS EVERY 6 HOURS AS NEEDED 1 Inhaler 5    traZODone (DESYREL) 50 MG tablet Take 0.5 tablets by well-developed and well-nourished. HENT:   Head: Normocephalic and atraumatic. Eyes: Conjunctivae and EOM are normal.   Neck: Neck supple. Cardiovascular: Normal rate, regular rhythm, normal heart sounds and intact distal pulses. Pulmonary/Chest: Effort normal. No accessory muscle usage. No respiratory distress. He has decreased breath sounds (slightly diminished throughout - baseline). He has no wheezes. He has no rhonchi. He has no rales. Abdominal: Soft. Bowel sounds are normal.   Musculoskeletal:        Thoracic back: He exhibits tenderness, pain and spasm. He exhibits no bony tenderness and no deformity. Back:    Lymphadenopathy:     He has no cervical adenopathy. Neurological: He is alert and oriented to person, place, and time. Skin: Skin is warm and dry. No rash noted. Psychiatric: He has a normal mood and affect. His speech is normal and behavior is normal.       Assessment/Plan:   1. Muscle strain of right upper back, initial encounter  Patient presents today with complaints of right upper back pain ×4 days. Patient is exercising and doing strength training with some light weights. On exam noted fairly significant muscle tenderness in the right upper back as noted above. I suspect muscle strain and recommend treatment as below. Advised patient to ice for 20 minute intervals every 2-3 hours avoid activity that aggravates him to follow-up if no better or worsening of symptoms. Patient's/son verbalized understanding and agreeable to plan. - tiZANidine (ZANAFLEX) 2 MG tablet; Take 1 tablet by mouth 3 times daily as needed (back pain/spasms)  Dispense: 30 tablet; Refill: 0  - traMADol (ULTRAM) 50 MG tablet; Take 1 tablet by mouth every 6 hours as needed for Pain for up to 7 days. Intended supply: 7 days. Take lowest dose possible to manage pain  Dispense: 28 tablet;  Refill: 0         Controlled Substance Monitoring:    Acute and Chronic Pain Monitoring:   RX Monitoring 6/5/2019

## 2019-06-05 NOTE — PATIENT INSTRUCTIONS
Please read the healthy family handout that you were given and share it with your family. Please compare this printed medication list with your medications at home to be sure they are the same. If you have any medications that are different please contact us immediately at [phone]. Also review your allergies that we have listed, these may also include medications that you have not been able to tolerate, make sure everything listed is correct. If you have any allergies that are different please contact us immediately at [phone]. Patient Education       ICE upper back for 20 min intervals every 2-3 hours, take muscle relaxants and pain medication as prescribed and Patient to f/u if no better or worsening of symptoms. Muscle Strain: Care Instructions  Your Care Instructions    A muscle strain happens when you overstretch, or pull, a muscle. It can happen when you exercise or lift something or when you have an accident. Rest and other home care can help the muscle heal.  Follow-up care is a key part of your treatment and safety. Be sure to make and go to all appointments, and call your doctor if you are having problems. It's also a good idea to know your test results and keep a list of the medicines you take. How can you care for yourself at home? · Rest the strained muscle. Do not put weight on it for a day or two. If your doctor advises you to, use crutches or a sling to rest a sore limb. · Put ice or a cold pack on the sore muscle for 10 to 20 minutes at a time to stop swelling. Put a thin cloth between the ice pack and your skin. · Prop up the sore arm or leg on a pillow when you ice it or anytime you sit or lie down during the next 3 days. Try to keep it above the level of your heart. This will help reduce swelling. · Take pain medicines exactly as directed. ? If the doctor gave you a prescription medicine for pain, take it as prescribed.   ? If you are not taking a prescription pain feel dizzy. Get up slowly and steady yourself to prevent a fall. What are the possible side effects of tizanidine? Get emergency medical help if you have signs of an allergic reaction: hives; difficult breathing; swelling of your face, lips, tongue, or throat. Call your doctor at once if you have:  · a light-headed feeling, like you might pass out;  · weak or shallow breathing;  · confusion, hallucinations; or  · pain or burning when you urinate. Common side effects may include:  · drowsiness, dizziness, weakness;  · feeling nervous;  · blurred vision;  · flu-like symptoms;  · dry mouth, trouble speaking;  · abnormal liver function tests;  · runny nose, sore throat;  · urination problems;  · vomiting, constipation; or  · uncontrolled muscle movements. This is not a complete list of side effects and others may occur. Call your doctor for medical advice about side effects. You may report side effects to FDA at 5-200-FDA-4023. What other drugs will affect tizanidine? Taking tizanidine with other drugs that make you sleepy or slow your breathing can cause dangerous side effects or death. Ask your doctor before taking a sleeping pill, narcotic pain medicine, prescription cough medicine, a muscle relaxer, or medicine for anxiety, depression, or seizures. Tell your doctor about all your current medicines and any you start or stop using, especially:  · acyclovir;  · ticlopidine;  · zileuton;  · birth control pills;  · an antibiotic --ciprofloxacin, gemifloxacin, levofloxacin, moxifloxacin, or ofloxacin;  · blood pressure medicine --clonidine, guanfacine, methyldopa;  · heart rhythm medicine --amiodarone, mexiletine, propafenone, verapamil; or  · stomach acid medicine --cimetidine, famotidine. This list is not complete. Other drugs may interact with tizanidine, including prescription and over-the-counter medicines, vitamins, and herbal products.  Not all possible interactions are listed in this medication guide.  Where can I get more information? Your pharmacist can provide more information about tizanidine. Remember, keep this and all other medicines out of the reach of children, never share your medicines with others, and use this medication only for the indication prescribed. Every effort has been made to ensure that the information provided by [address] is accurate, up-to-date, and complete, but no guarantee is made to that effect. Drug information contained herein may be time sensitive. [facility] information has been compiled for use by healthcare practitioners and consumers in the [place] and therefore [facility] does not warrant that uses outside of the [place] are appropriate, unless specifically indicated otherwise. [facility]'s drug information does not endorse drugs, diagnose patients or recommend therapy. [facility]'s drug information is an informational resource designed to assist licensed healthcare practitioners in caring for their patients and/or to serve consumers viewing this service as a supplement to, and not a substitute for, the expertise, skill, knowledge and judgment of healthcare practitioners. The absence of a warning for a given drug or drug combination in no way should be construed to indicate that the drug or drug combination is safe, effective or appropriate for any given patient. [facility] does not assume any responsibility for any aspect of healthcare administered with the aid of information [facility] provides. The information contained herein is not intended to cover all possible uses, directions, precautions, warnings, drug interactions, allergic reactions, or adverse effects. If you have questions about the drugs you are taking, check with your doctor, nurse or pharmacist.  Copyright 6023-3289 [address]. Version: 3.01. Revision date: 2/27/2017. Care instructions adapted under license by [facility] ([place] [facility]).  If you have questions about a medical condition or this instruction, always ask your healthcare professional. [address] any warranty or liability for your use of this information. Patient Education        tramadol  Pronunciation:  TRAM a dol  Brand:  ConZip, Ultram, Ultram ER  What is the most important information I should know about tramadol? MISUSE OF THIS MEDICINE CAN CAUSE ADDICTION, OVERDOSE, OR DEATH. Keep the medication in a place where others cannot get to it. Tramadol should not be given to a child younger than 15years old. Ultram ER should not be given to anyone younger than 25years old. Taking tramadol during pregnancy may cause life-threatening withdrawal symptoms in the . Fatal side effects can occur if you use tramadol with alcohol, or with other drugs that cause drowsiness or slow your breathing. What is tramadol? Tramadol is an pain medicine similar to an opioid (sometimes called, a narcotic). Tramadol is used to treat moderate to severe pain. The extended-release form of this medicine is for around-the-clock treatment of pain. This form of tramadol is not  for use on an as-needed basis for pain. Tramadol may also be used for purposes not listed in this medication guide. What should I discuss with my healthcare provider before taking tramadol? You should not take tramadol if you are allergic to it, or if you have:  · severe asthma or breathing problems;  · a blockage in your stomach or intestines;  · if you have recently used alcohol, sedatives, tranquilizers, or narcotic medications; or  · if you have used an MAO inhibitor in the past 14 days (such as isocarboxazid, linezolid, methylene blue injection, phenelzine, rasagiline, selegiline, or tranylcypromine). Tramadol should not be given to a child younger than 15years old. Ultram ER should not be given to anyone younger than 25years old.   Do not give tramadol to anyone younger than 25years old who recently had surgery to remove the tonsils or adenoids. Seizures have occurred in some people taking tramadol. Talk with your doctor about your seizure risk, which may be higher if you have ever had:  · a head injury, epilepsy or other seizure disorder;  · drug or alcohol addiction; or  · a metabolic disorder. Tell your doctor if you have ever had:  · liver or kidney disease;  · a stomach disorder; or  · mental illness, or suicide attempt. If you use tramadol while you are pregnant, your baby could become dependent on the drug. This can cause life-threatening withdrawal symptoms in the baby after it is born. Babies born dependent on habit-forming medicine may need medical treatment for several weeks. Do not breast-feed. Tramadol can pass into breast milk and cause drowsiness, breathing problems, or death in a nursing baby. How should I take tramadol? Follow the directions on your prescription label and read all medication guides. Never use tramadol in larger amounts, or for longer than prescribed. Tell your doctor if you feel an increased urge to take more of this medicine. Never share this medicine with another person, especially someone with a history of drug abuse or addiction. MISUSE CAN CAUSE ADDICTION, OVERDOSE, OR DEATH. Keep the medicine in a place where others cannot get to it. Selling or giving away tramadol is against the law. Stop taking all other around-the-clock narcotic pain medications when you start taking tramadol. Tramadol can be taken with or without food, but take it the same way each time. Swallow the capsule or tablet whole to avoid exposure to a potentially fatal overdose. Do not crush, chew, break, open, or dissolve. Never crush or break a tramadol pill to inhale the powder or mix it into a liquid to inject the drug into your vein. This practice has resulted in death. Do not stop using tramadol suddenly, or you could have unpleasant withdrawal symptoms. Ask your doctor how to safely stop using tramadol.   Store at room temperature away from moisture and heat. Keep track of your medicine. You should be aware if anyone is using it improperly or without a prescription. Do not keep leftover opioid medication. Just one dose can cause death in someone using this medicine accidentally or improperly. Ask your pharmacist where to locate a drug take-back disposal program. If there is no take-back program, flush the unused medicine down the toilet. What happens if I miss a dose? Since tramadol is used for pain, you are not likely to miss a dose. Skip any missed dose if it is almost time for your next dose. Do not use two doses at one time. What happens if I overdose? Seek emergency medical attention or call the Poison Help line at 1-522.793.4379. A tramadol overdose can be fatal, especially in a child or other person using the medicine without a prescription. Overdose symptoms may include slow heart rate, severe drowsiness, cold and clammy skin, very slow breathing, or coma. What should I avoid while taking tramadol? Do not drink alcohol. Dangerous side effects or death could occur. Avoid driving or hazardous activity until you know how this medicine will affect you. Dizziness or drowsiness can cause falls, accidents, or severe injuries. What are the possible side effects of tramadol? Get emergency medical help if you have signs of an allergic reaction (hives, difficult breathing, swelling in your face or throat) or a severe skin reaction (fever, sore throat, burning in your eyes, skin pain, red or purple skin rash that spreads and causes blistering and peeling). This medicine can slow or stop your breathing, and death may occur. A person caring for you should seek emergency medical attention if you have slow breathing with long pauses, blue colored lips, or if you are hard to wake up.   Call your doctor at once if you have:  · noisy breathing, sighing, shallow breathing;  · a slow heart rate or weak pulse;  · a light-headed illness; or  · drugs that affect serotonin levels in your body --a stimulant, or medicine for depression, Parkinson's disease, migraine headaches, serious infections, or nausea and vomiting. This list is not complete and many other drugs may affect tramadol. This includes prescription and over-the-counter medicines, vitamins, and herbal products. Not all possible drug interactions are listed here. Where can I get more information? Your doctor or pharmacist can provide more information about tramadol. Remember, keep this and all other medicines out of the reach of children, never share your medicines with others, and use this medication only for the indication prescribed. Every effort has been made to ensure that the information provided by [address] is accurate, up-to-date, and complete, but no guarantee is made to that effect. Drug information contained herein may be time sensitive. [facility] information has been compiled for use by healthcare practitioners and consumers in the [facility] and therefore [facility] does not warrant that uses outside of the [facility] are appropriate, unless specifically indicated otherwise. [facility]'s drug information does not endorse drugs, diagnose patients or recommend therapy. [facility] drug information is an informational resource designed to assist licensed healthcare practitioners in caring for their patients and/or to serve consumers viewing this service as a supplement to, and not a substitute for, the expertise, skill, knowledge and judgment of healthcare practitioners. The absence of a warning for a given drug or drug combination in no way should be construed to indicate that the drug or drug combination is safe, effective or appropriate for any given patient. [facility] does not assume any responsibility for any aspect of healthcare administered with the aid of information [facility] provides.  The information contained herein is not intended to cover all possible uses, directions, precautions, warnings, drug interactions, allergic reactions, or adverse effects. If you have questions about the drugs you are taking, check with your doctor, nurse or pharmacist.  Copyright 6476-6990 [address]. Version: 18.02. Revision date: 11/28/2018. Care instructions adapted under license by [facility] [address] If you have questions about a medical condition or this instruction, always ask your healthcare professional. [place] [address] any warranty or liability for your use of this information.

## 2019-06-20 ENCOUNTER — OFFICE VISIT (OUTPATIENT)
Dept: FAMILY MEDICINE CLINIC | Age: 79
End: 2019-06-20
Payer: MEDICARE

## 2019-06-20 VITALS
TEMPERATURE: 98 F | SYSTOLIC BLOOD PRESSURE: 138 MMHG | OXYGEN SATURATION: 94 % | DIASTOLIC BLOOD PRESSURE: 56 MMHG | HEART RATE: 50 BPM

## 2019-06-20 DIAGNOSIS — I10 ESSENTIAL HYPERTENSION: ICD-10-CM

## 2019-06-20 DIAGNOSIS — F32.9 REACTIVE DEPRESSION: Primary | ICD-10-CM

## 2019-06-20 PROCEDURE — 99213 OFFICE O/P EST LOW 20 MIN: CPT | Performed by: NURSE PRACTITIONER

## 2019-06-20 PROCEDURE — G8427 DOCREV CUR MEDS BY ELIG CLIN: HCPCS | Performed by: NURSE PRACTITIONER

## 2019-06-20 PROCEDURE — 1036F TOBACCO NON-USER: CPT | Performed by: NURSE PRACTITIONER

## 2019-06-20 PROCEDURE — G8417 CALC BMI ABV UP PARAM F/U: HCPCS | Performed by: NURSE PRACTITIONER

## 2019-06-20 PROCEDURE — 4040F PNEUMOC VAC/ADMIN/RCVD: CPT | Performed by: NURSE PRACTITIONER

## 2019-06-20 PROCEDURE — 1123F ACP DISCUSS/DSCN MKR DOCD: CPT | Performed by: NURSE PRACTITIONER

## 2019-06-20 RX ORDER — TRAZODONE HYDROCHLORIDE 50 MG/1
25 TABLET ORAL NIGHTLY
Qty: 15 TABLET | Refills: 5 | Status: SHIPPED | OUTPATIENT
Start: 2019-06-20 | End: 2019-09-24 | Stop reason: SDUPTHER

## 2019-06-20 RX ORDER — LISINOPRIL 10 MG/1
TABLET ORAL
Qty: 30 TABLET | Refills: 5 | Status: SHIPPED | OUTPATIENT
Start: 2019-06-20 | End: 2020-03-12

## 2019-06-20 NOTE — PATIENT INSTRUCTIONS
Please read the healthy family handout that you were given and share it with your family. Please compare this printed medication list with your medications at home to be sure they are the same. If you have any medications that are different please contact us immediately at [phone]. Also review your allergies that we have listed, these may also include medications that you have not been able to tolerate, make sure everything listed is correct. If you have any allergies that are different please contact us immediately at [phone]. Patient Education        Recovering From Depression: Care Instructions  Your Care Instructions    Taking good care of yourself is important as you recover from depression. In time, your symptoms will fade as your treatment takes hold. Do not give up. Instead, focus your energy on getting better. Your mood will improve. It just takes some time. Focus on things that can help you feel better, such as being with friends and family, eating well, and getting enough rest. But take things slowly. Do not do too much too soon. You will begin to feel better gradually. Follow-up care is a key part of your treatment and safety. Be sure to make and go to all appointments, and call your doctor if you are having problems. It's also a good idea to know your test results and keep a list of the medicines you take. How can you care for yourself at home? Be realistic  · If you have a large task to do, break it up into smaller steps you can handle, and just do what you can. · You may want to put off important decisions until your depression has lifted. If you have plans that will have a major impact on your life, such as marriage, divorce, or a job change, try to wait a bit. Talk it over with friends and loved ones who can help you look at the overall picture first.  · Reaching out to people for help is important. Do not isolate yourself. Let your family and friends help you.  Find someone you can trust and confide in, and talk to that person. · Be patient, and be kind to yourself. Remember that depression is not your fault and is not something you can overcome with willpower alone. Treatment is necessary for depression, just like for any other illness. Feeling better takes time, and your mood will improve little by little. Stay active  · Stay busy and get outside. Take a walk, or try some other light exercise. · Talk with your doctor about an exercise program. Exercise can help with mild depression. · Go to a movie or concert. Take part in a Hindu activity or other social gathering. Go to a [employer] game. · Ask a friend to have dinner with you. Take care of yourself  · Eat a balanced diet with plenty of fresh fruits and vegetables, whole grains, and lean protein. If you have lost your appetite, eat small snacks rather than large meals. · Avoid drinking alcohol or using illegal drugs. Do not take medicines that have not been prescribed for you. They may interfere with medicines you may be taking for depression, or they may make your depression worse. · Take your medicines exactly as they are prescribed. You may start to feel better within 1 to 3 weeks of taking antidepressant medicine. But it can take as many as 6 to 8 weeks to see more improvement. If you have questions or concerns about your medicines, or if you do not notice any improvement by 3 weeks, talk to your doctor. · If you have any side effects from your medicine, tell your doctor. Antidepressants can make you feel tired, dizzy, or nervous. Some people have dry mouth, constipation, headaches, sexual problems, or diarrhea. Many of these side effects are mild and will go away on their own after you have been taking the medicine for a few weeks. Some may last longer. Talk to your doctor if side effects are bothering you too much. You might be able to try a different medicine. · Get enough sleep.  If you have problems sleeping:  ? Go to bed as of: September 11, 2018  Content Version: 12.0  © 7719-5813 [employer]. Care instructions adapted under license by [facility] ([facility]). If you have questions about a medical condition or this instruction, always ask your healthcare professional. [address] any warranty or liability for your use of this information.

## 2019-06-20 NOTE — PROGRESS NOTES
Alcohol/week: 0.0 oz     Comment: occasionally       Objective:   BP (!) 138/56   Pulse 50   Temp 98 °F (36.7 °C) (Oral)   SpO2 94%     Physical Exam   Constitutional: He is oriented to person, place, and time. HENT:   Head: Normocephalic and atraumatic. Eyes: Conjunctivae and EOM are normal.   Neck: Normal range of motion. Neck supple. No thyromegaly present. Cardiovascular: Normal rate, regular rhythm, normal heart sounds and intact distal pulses. Exam reveals no gallop and no friction rub. No murmur heard. Pulmonary/Chest: Effort normal and breath sounds normal. No respiratory distress. Abdominal: Soft. Bowel sounds are normal.   Musculoskeletal: Normal range of motion. Neurological: He is oriented to person, place, and time. Coordination normal.   Skin: Skin is warm and dry. No rash noted. Assessment/Plan:   1. Reactive depression  Patient presents today to follow-up on depression including symptoms of irritability, fatigue and lack of motivation. Patient started on Lexapro approximately 6 weeks ago. Patient reports symptoms are gradually improving. She denies any side effects from medication. Recommend patient continue with current medication regimen and follow-up in office in 3 months or sooner with problems or concerns. Patient verbalized understanding and agreeable to plan. 2. Essential hypertension  Refill.  - lisinopril (PRINIVIL;ZESTRIL) 10 MG tablet; TAKE (1) TABLET DAILY  Dispense: 30 tablet;  Refill: 5

## 2019-07-11 ENCOUNTER — HOSPITAL ENCOUNTER (OUTPATIENT)
Dept: CT IMAGING | Age: 79
Discharge: HOME | End: 2019-07-11
Payer: MEDICARE

## 2019-07-11 DIAGNOSIS — R91.1 PULMONARY NODULE: ICD-10-CM

## 2019-07-11 PROCEDURE — 71250 CT THORAX DX C-: CPT

## 2019-07-18 ENCOUNTER — OFFICE VISIT (OUTPATIENT)
Dept: PULMONOLOGY | Age: 79
End: 2019-07-18
Payer: MEDICARE

## 2019-07-18 VITALS
OXYGEN SATURATION: 95 % | HEIGHT: 67 IN | HEART RATE: 56 BPM | BODY MASS INDEX: 30.29 KG/M2 | WEIGHT: 193 LBS | TEMPERATURE: 97.8 F | SYSTOLIC BLOOD PRESSURE: 138 MMHG | RESPIRATION RATE: 16 BRPM | DIASTOLIC BLOOD PRESSURE: 72 MMHG

## 2019-07-18 DIAGNOSIS — J44.9 COPD, SEVERE (HCC): ICD-10-CM

## 2019-07-18 DIAGNOSIS — J43.1 PANLOBULAR EMPHYSEMA (HCC): ICD-10-CM

## 2019-07-18 DIAGNOSIS — R91.1 PULMONARY NODULE: Primary | ICD-10-CM

## 2019-07-18 PROCEDURE — 1123F ACP DISCUSS/DSCN MKR DOCD: CPT | Performed by: INTERNAL MEDICINE

## 2019-07-18 PROCEDURE — G8417 CALC BMI ABV UP PARAM F/U: HCPCS | Performed by: INTERNAL MEDICINE

## 2019-07-18 PROCEDURE — G8926 SPIRO NO PERF OR DOC: HCPCS | Performed by: INTERNAL MEDICINE

## 2019-07-18 PROCEDURE — 99214 OFFICE O/P EST MOD 30 MIN: CPT | Performed by: INTERNAL MEDICINE

## 2019-07-18 PROCEDURE — 1036F TOBACCO NON-USER: CPT | Performed by: INTERNAL MEDICINE

## 2019-07-18 PROCEDURE — 3023F SPIROM DOC REV: CPT | Performed by: INTERNAL MEDICINE

## 2019-07-18 PROCEDURE — G8427 DOCREV CUR MEDS BY ELIG CLIN: HCPCS | Performed by: INTERNAL MEDICINE

## 2019-07-18 PROCEDURE — 4040F PNEUMOC VAC/ADMIN/RCVD: CPT | Performed by: INTERNAL MEDICINE

## 2019-07-18 RX ORDER — FLUTICASONE FUROATE AND VILANTEROL 100; 25 UG/1; UG/1
POWDER RESPIRATORY (INHALATION)
Qty: 1 EACH | Refills: 6 | Status: SHIPPED | OUTPATIENT
Start: 2019-07-18 | End: 2020-03-16

## 2019-07-18 NOTE — PROGRESS NOTES
11    ZOLEDRONIC ACID IV, Infuse intravenously, Disp: , Rfl:     Multiple Vitamins-Minerals (THERAPEUTIC MULTIVITAMIN-MINERALS) tablet, Take 1 tablet by mouth daily, Disp: , Rfl:     atorvastatin (LIPITOR) 20 MG tablet, Take 1 tablet by mouth daily, Disp: 30 tablet, Rfl: 5    hydrochlorothiazide (HYDRODIURIL) 25 MG tablet, TAKE (1) TABLET DAILY, Disp: 30 tablet, Rfl: 5    metoprolol tartrate (LOPRESSOR) 25 MG tablet, TAKE 1 TABLET TWICE DAILY, Disp: 60 tablet, Rfl: 5    pantoprazole (PROTONIX) 40 MG tablet, TAKE (1) TABLET DAILY, Disp: 30 tablet, Rfl: 5    tamsulosin (FLOMAX) 0.4 MG capsule, TAKE 2 CAPSULES AT BEDTIME, Disp: 60 capsule, Rfl: 5    aspirin 81 MG tablet, Take 81 mg by mouth daily, Disp: , Rfl:     fluticasone-vilanterol (BREO ELLIPTA) 100-25 MCG/INH AEPB inhaler, INHALE 1 PUFF DAILY, Disp: 1 each, Rfl: 5    fluticasone (FLONASE) 50 MCG/ACT nasal spray, USE 2 SPRAYS INTO EACH NOSTRIL DAILY, Disp: 16 g, Rfl: 5    polyethylene glycol (GLYCOLAX) powder, Take 17 g by mouth daily, Disp: , Rfl:     acetaminophen (TYLENOL) 325 MG tablet, Take 2 tablets by mouth every 4 hours as needed for Fever (Fever >100.5 (38 C), pain), Disp: 120 tablet, Rfl: 3    potassium chloride (KLOR-CON M) 20 MEQ extended release tablet, Take 1 tablet by mouth daily (with breakfast), Disp: 60 tablet, Rfl: 3    lactulose (CHRONULAC) 10 GM/15ML solution, Take 15 mLs by mouth every evening, Disp: 240 mL, Rfl: 0      Objective:     /72   Pulse 56   Temp 97.8 °F (36.6 °C) (Oral)   Resp 16   Ht 5' 7\" (1.702 m)   Wt 193 lb (87.5 kg)   SpO2 95% Comment: ra  BMI 30.23 kg/m² RA  Gen: No distress. Obese. Eyes: PERRL. No sclera icterus. No conjunctival injection. ENT: No discharge. Pharynx clear. Neck: Trachea midline. No obvious mass. Resp: No accessory muscle use. No crackles. No wheezes. No rhonchi. No dullness on percussion. Decreased air entry. CV: Regular rate. Regular rhythm. No murmur or rub. No edema.

## 2019-08-01 RX ORDER — TRAZODONE HYDROCHLORIDE 50 MG/1
TABLET ORAL
Qty: 15 TABLET | Refills: 3 | Status: SHIPPED | OUTPATIENT
Start: 2019-08-01 | End: 2019-09-24

## 2019-08-22 DIAGNOSIS — F33.1 MODERATE EPISODE OF RECURRENT MAJOR DEPRESSIVE DISORDER (HCC): ICD-10-CM

## 2019-08-22 DIAGNOSIS — R53.83 FATIGUE, UNSPECIFIED TYPE: ICD-10-CM

## 2019-08-23 RX ORDER — ESCITALOPRAM OXALATE 10 MG/1
TABLET ORAL
Qty: 30 TABLET | Refills: 0 | Status: SHIPPED | OUTPATIENT
Start: 2019-08-23 | End: 2019-10-24 | Stop reason: SDUPTHER

## 2019-08-23 NOTE — TELEPHONE ENCOUNTER
Future appt scheduled 09/24/2019  Last appt 06/20/2019    Last Written 05/08/2019  escitalopram (LEXAPRO) 10 MG tablet   #30  3 RF

## 2019-09-12 DIAGNOSIS — N40.1 BENIGN PROSTATIC HYPERPLASIA WITH LOWER URINARY TRACT SYMPTOMS, SYMPTOM DETAILS UNSPECIFIED: ICD-10-CM

## 2019-09-12 RX ORDER — TAMSULOSIN HYDROCHLORIDE 0.4 MG/1
CAPSULE ORAL
Qty: 60 CAPSULE | Refills: 0 | Status: SHIPPED | OUTPATIENT
Start: 2019-09-12 | End: 2020-01-16

## 2019-09-12 RX ORDER — TAMSULOSIN HYDROCHLORIDE 0.4 MG/1
CAPSULE ORAL
Qty: 60 CAPSULE | Refills: 0 | OUTPATIENT
Start: 2019-09-12

## 2019-09-12 NOTE — TELEPHONE ENCOUNTER
Future appt scheduled 9/24/19  Last appt 6/20/19      This was filled by Dr. Uma Pascual last time. Pt new to you on 3/25/19    Do you wish to fill?       Thank you

## 2019-09-24 ENCOUNTER — OFFICE VISIT (OUTPATIENT)
Dept: FAMILY MEDICINE CLINIC | Age: 79
End: 2019-09-24
Payer: MEDICARE

## 2019-09-24 VITALS — OXYGEN SATURATION: 93 % | SYSTOLIC BLOOD PRESSURE: 139 MMHG | DIASTOLIC BLOOD PRESSURE: 62 MMHG | HEART RATE: 51 BPM

## 2019-09-24 DIAGNOSIS — E78.5 HYPERLIPIDEMIA WITH TARGET LDL LESS THAN 130: ICD-10-CM

## 2019-09-24 DIAGNOSIS — I10 ESSENTIAL HYPERTENSION: ICD-10-CM

## 2019-09-24 DIAGNOSIS — F32.9 REACTIVE DEPRESSION: Primary | ICD-10-CM

## 2019-09-24 DIAGNOSIS — Z23 NEED FOR IMMUNIZATION AGAINST INFLUENZA: ICD-10-CM

## 2019-09-24 DIAGNOSIS — Z86.73 HISTORY OF CVA (CEREBROVASCULAR ACCIDENT): ICD-10-CM

## 2019-09-24 DIAGNOSIS — I48.0 PAROXYSMAL ATRIAL FIBRILLATION (HCC): ICD-10-CM

## 2019-09-24 PROCEDURE — 1123F ACP DISCUSS/DSCN MKR DOCD: CPT | Performed by: NURSE PRACTITIONER

## 2019-09-24 PROCEDURE — 99214 OFFICE O/P EST MOD 30 MIN: CPT | Performed by: NURSE PRACTITIONER

## 2019-09-24 PROCEDURE — G8427 DOCREV CUR MEDS BY ELIG CLIN: HCPCS | Performed by: NURSE PRACTITIONER

## 2019-09-24 PROCEDURE — 36415 COLL VENOUS BLD VENIPUNCTURE: CPT | Performed by: NURSE PRACTITIONER

## 2019-09-24 PROCEDURE — 4040F PNEUMOC VAC/ADMIN/RCVD: CPT | Performed by: NURSE PRACTITIONER

## 2019-09-24 PROCEDURE — G0008 ADMIN INFLUENZA VIRUS VAC: HCPCS | Performed by: NURSE PRACTITIONER

## 2019-09-24 PROCEDURE — G8417 CALC BMI ABV UP PARAM F/U: HCPCS | Performed by: NURSE PRACTITIONER

## 2019-09-24 PROCEDURE — 1036F TOBACCO NON-USER: CPT | Performed by: NURSE PRACTITIONER

## 2019-09-24 PROCEDURE — 90653 IIV ADJUVANT VACCINE IM: CPT | Performed by: NURSE PRACTITIONER

## 2019-09-24 RX ORDER — TRAZODONE HYDROCHLORIDE 50 MG/1
50 TABLET ORAL NIGHTLY
Qty: 30 TABLET | Refills: 5 | Status: SHIPPED | OUTPATIENT
Start: 2019-09-24

## 2019-09-24 RX ORDER — ALBUTEROL SULFATE 90 UG/1
AEROSOL, METERED RESPIRATORY (INHALATION)
Qty: 1 INHALER | Refills: 5 | Status: SHIPPED | OUTPATIENT
Start: 2019-09-24 | End: 2020-05-04

## 2019-09-24 RX ORDER — FLUTICASONE PROPIONATE 50 MCG
SPRAY, SUSPENSION (ML) NASAL
Qty: 16 G | Refills: 5 | Status: SHIPPED | OUTPATIENT
Start: 2019-09-24

## 2019-09-24 ASSESSMENT — ENCOUNTER SYMPTOMS
GASTROINTESTINAL NEGATIVE: 1
EYES NEGATIVE: 1

## 2019-09-24 NOTE — PATIENT INSTRUCTIONS
Be sure to make and go to all appointments, and call your doctor if you are having problems. It's also a good idea to know your test results and keep a list of the medicines you take. How can you care for yourself at home? Be realistic  · If you have a large task to do, break it up into smaller steps you can handle, and just do what you can. · You may want to put off important decisions until your depression has lifted. If you have plans that will have a major impact on your life, such as marriage, divorce, or a job change, try to wait a bit. Talk it over with friends and loved ones who can help you look at the overall picture first.  · Reaching out to people for help is important. Do not isolate yourself. Let your family and friends help you. Find someone you can trust and confide in, and talk to that person. · Be patient, and be kind to yourself. Remember that depression is not your fault and is not something you can overcome with willpower alone. Treatment is necessary for depression, just like for any other illness. Feeling better takes time, and your mood will improve little by little. Stay active  · Stay busy and get outside. Take a walk, or try some other light exercise. · Talk with your doctor about an exercise program. Exercise can help with mild depression. · Go to a movie or concert. Take part in a Muslim activity or other social gathering. Go to a ball game. · Ask a friend to have dinner with you. Take care of yourself  · Eat a balanced diet with plenty of fresh fruits and vegetables, whole grains, and lean protein. If you have lost your appetite, eat small snacks rather than large meals. · Avoid drinking alcohol or using illegal drugs. Do not take medicines that have not been prescribed for you. They may interfere with medicines you may be taking for depression, or they may make your depression worse. · Take your medicines exactly as they are prescribed.  You may start to feel better within 1   · Someone you know has depression and:  ? Starts to give away his or her possessions. ? Uses illegal drugs or drinks alcohol heavily. ? Talks or writes about death, including writing suicide notes or talking about guns, knives, or pills. ? Starts to spend a lot of time alone. ? Acts very aggressively or suddenly appears calm.    Watch closely for changes in your health, and be sure to contact your doctor if:    · You do not get better as expected. Where can you learn more? Go to https://ComfywarepeOurStory.Twist. org and sign in to your [employer] account. Enter U629 in the ZIIBRA box to learn more about \"Recovering From Depression: Care Instructions. \"     If you do not have an account, please click on the \"Sign Up Now\" link. Current as of: September 11, 2018  Content Version: 12.1  © 8782-4256 [employer]. Care instructions adapted under license by [facility] ([facility]). If you have questions about a medical condition or this instruction, always ask your healthcare professional. [place] [address] any warranty or liability for your use of this information. Patient Education        influenza virus vaccine (injection)  Pronunciation:  in conchiso PRETTY JONES seen  Brand:  Afluria 1802-7931, Afluria PF Quadrivalent 0124-7340, Afluria Preservative-Free 6112-9183, Afluria Quadrivalent 1062-2228, Fluad 2530-6679, Fluarix PF Quadrivalent 6774-0147, Flublok 0720-6294, Flublok Quadrivalent 7642-9383, Flucelvax PF Quadrivalent 0536-8841, Flucelvax Quadrivalent 6222-2762, FluLaval PF Quadrivalent 1365-8333, FluLaval Quadrivalent 3673-7581, Fluzone High-Dose 4282-4783, Fluzone PF Quadrivalent 4884-7080, Fluzone Quadrivalent 1113-3418  What is the most important information I should know about this vaccine? The injectable influenza virus vaccine (flu shot) is a \"killed virus\" vaccine.  Influenza virus vaccine is also available in a nasal spray form, which is a [facility] virus\" vaccine. This medication guide addresses only the injectable form of this vaccine. Becoming infected with influenza is much more dangerous to your health than receiving this vaccine. However, like any medicine, this vaccine can cause side effects but the risk of serious side effects is extremely low. What is influenza virus vaccine? Influenza virus (commonly known as \"the flu\") is a serious disease caused by a virus. Influenza virus can spread from one person to another through small droplets of saliva that are expelled into the air when an infected person coughs or sneezes. The virus can also be passed through contact with objects the infected person has touched, such as a door handle or other surfaces. Influenza virus vaccine is used to prevent infection caused by influenza virus. The vaccine is redeveloped each year to contain specific strains of inactivated (killed) flu virus that are recommended by public health officials for that year. The injectable influenza virus vaccine (flu shot) is a \"killed virus\" vaccine. Influenza virus vaccine is also available in a nasal spray form, which is a \"live virus\" vaccine. Influenza virus vaccine works by exposing you to a small dose of the virus, which helps your body to develop immunity to the disease. Influenza virus vaccine will not treat an active infection that has already developed in the body. Influenza virus vaccine is for use in adults and children who are at least 7 months old. Becoming infected with influenza is much more dangerous to your health than receiving this vaccine. Influenza causes thousands of deaths each year, and hundreds of thousands of hospitalizations. However, like any medicine, this vaccine can cause side effects but the risk of serious side effects is extremely low. Like any vaccine, influenza virus vaccine may not provide protection from disease in every person.  This vaccine will not prevent illness caused by yusra flu (\"bird you receive the influenza virus vaccine. Children receiving this vaccine may need a booster shot one month after receiving the first vaccine. The influenza virus vaccine is usually given in October or November. Some people may need to have their vaccines earlier or later. Follow your doctor's instructions. Your doctor may recommend treating fever and pain with an aspirin-free pain reliever such as acetaminophen (Tylenol) or ibuprofen (Motrin, Advil, and others) when the shot is given and for the next 24 hours. Follow the label directions or your doctor's instructions about how much of this medicine to give your child. It is especially important to prevent fever from occurring in a child who has a seizure disorder such as epilepsy. What happens if I miss a dose? Since flu shots are usually given only one time per year, you will most likely not be on a dosing schedule. Call your doctor if you forget to receive your yearly flu shot in October or November. If your child misses a booster dose of this vaccine, call your doctor for instructions. What happens if I overdose? An overdose of this vaccine is unlikely to occur. What should I avoid before or after receiving this vaccine? Follow your doctor's instructions about any restrictions on food, beverages, or activity. What are the possible side effects of influenza virus injectable vaccine? Get emergency medical help if you have signs of an allergic reaction: hives; difficulty breathing; swelling of your face, lips, tongue, or throat. You should not receive a booster vaccine if you had a life-threatening allergic reaction after the first shot. Keep track of any and all side effects you have after receiving this vaccine. If you ever need to receive influenza virus vaccine in the future, you will need to tell your doctor if the previous shot caused any side effects.   Influenza virus injectable (killed virus) vaccine will not cause you to become ill with the flu virus that it contains. However, you may have flu-like symptoms at any time during flu season that may be caused by other strains of influenza virus. Call your doctor at once if you have:  · a light-headed feeling, like you might pass out;  · severe weakness or unusual feeling in your arms and legs (may occur 2 to 4 weeks after you receive the vaccine);  · high fever;  · seizure (convulsions); or  · unusual bleeding. Common side effects may include:  · low fever, chills;  · mild fussiness or crying;  · redness, bruising, pain, swelling, or a lump where the vaccine was injected;  · headache, tired feeling; or  · joint or muscle pain. This is not a complete list of side effects and others may occur. Call your doctor for medical advice about side effects. You may report vaccine side effects to the [place] [address] and Human Services at [phone]. What other drugs will affect influenza virus injectable vaccine? If you are using any of the following medicines, you may not be able to receive the vaccine, or may need to wait until the other treatments are finished:  · phenytoin, theophylline, or warfarin (Coumadin, Jantoven);  · an oral, nasal, inhaled, or injectable steroid medicine;  · medications to treat psoriasis, rheumatoid arthritis, or other autoimmune disorders--azathioprine, etanercept, leflunomide, and others; or  · medicines to treat or prevent organ transplant rejection--basiliximab, cyclosporine, muromonab-CD3, mycophenolate mofetil, sirolimus, tacrolimus. This list is not complete. Other drugs may interact with influenza virus vaccine, including prescription and over-the-counter medicines, vitamins, and herbal products. Not all possible interactions are listed in this medication guide. Where can I get more information? Your doctor or pharmacist can provide more information about this vaccine.  Additional information is available from your local health department or the [facility] for Disease Control and Prevention. Remember, keep this and all other medicines out of the reach of children, never share your medicines with others, and use this medication only for the indication prescribed. Every effort has been made to ensure that the information provided by [address] is accurate, up-to-date, and complete, but no guarantee is made to that effect. Drug information contained herein may be time sensitive. [facility] information has been compiled for use by healthcare practitioners and consumers in the [place] and therefore [facility] does not warrant that uses outside of the [place] are appropriate, unless specifically indicated otherwise. [facility]'s drug information does not endorse drugs, diagnose patients or recommend therapy. [facility]'s drug information is an informational resource designed to assist licensed healthcare practitioners in caring for their patients and/or to serve consumers viewing this service as a supplement to, and not a substitute for, the expertise, skill, knowledge and judgment of healthcare practitioners. The absence of a warning for a given drug or drug combination in no way should be construed to indicate that the drug or drug combination is safe, effective or appropriate for any given patient. [facility] does not assume any responsibility for any aspect of healthcare administered with the aid of information [facility] provides. The information contained herein is not intended to cover all possible uses, directions, precautions, warnings, drug interactions, allergic reactions, or adverse effects. If you have questions about the drugs you are taking, check with your doctor, nurse or pharmacist.  Copyright 2501-5769 [address]: 7.13. Revision date: 8/10/2018. Care instructions adapted under license by [facility] ([facility]).  If you have questions about a medical condition or this instruction, always ask your healthcare professional. [employer] disclaims any warranty or liability for your use of this information.

## 2019-09-25 LAB
A/G RATIO: 1.7 (ref 1.1–2.2)
ALBUMIN SERPL-MCNC: 4.2 G/DL (ref 3.4–5)
ALP BLD-CCNC: 57 U/L (ref 40–129)
ALT SERPL-CCNC: 14 U/L (ref 10–40)
ANION GAP SERPL CALCULATED.3IONS-SCNC: 14 MMOL/L (ref 3–16)
AST SERPL-CCNC: 17 U/L (ref 15–37)
BILIRUB SERPL-MCNC: 0.7 MG/DL (ref 0–1)
BUN BLDV-MCNC: 15 MG/DL (ref 7–20)
CALCIUM SERPL-MCNC: 9.6 MG/DL (ref 8.3–10.6)
CHLORIDE BLD-SCNC: 93 MMOL/L (ref 99–110)
CO2: 30 MMOL/L (ref 21–32)
CREAT SERPL-MCNC: 0.6 MG/DL (ref 0.8–1.3)
GFR AFRICAN AMERICAN: >60
GFR NON-AFRICAN AMERICAN: >60
GLOBULIN: 2.5 G/DL
GLUCOSE BLD-MCNC: 89 MG/DL (ref 70–99)
POTASSIUM SERPL-SCNC: 3.7 MMOL/L (ref 3.5–5.1)
SODIUM BLD-SCNC: 137 MMOL/L (ref 136–145)
TOTAL PROTEIN: 6.7 G/DL (ref 6.4–8.2)

## 2019-09-26 DIAGNOSIS — I10 ESSENTIAL HYPERTENSION: ICD-10-CM

## 2019-09-26 RX ORDER — ATORVASTATIN CALCIUM 20 MG/1
TABLET, FILM COATED ORAL
Qty: 30 TABLET | Refills: 0 | OUTPATIENT
Start: 2019-09-26

## 2019-09-26 RX ORDER — HYDROCHLOROTHIAZIDE 25 MG/1
TABLET ORAL
Qty: 30 TABLET | Refills: 0 | OUTPATIENT
Start: 2019-09-26

## 2019-09-27 DIAGNOSIS — I10 ESSENTIAL HYPERTENSION: ICD-10-CM

## 2019-09-27 RX ORDER — HYDROCHLOROTHIAZIDE 25 MG/1
TABLET ORAL
Qty: 30 TABLET | Refills: 3 | Status: SHIPPED | OUTPATIENT
Start: 2019-09-27 | End: 2020-01-23

## 2019-09-27 RX ORDER — ATORVASTATIN CALCIUM 20 MG/1
20 TABLET, FILM COATED ORAL DAILY
Qty: 30 TABLET | Refills: 3 | Status: SHIPPED | OUTPATIENT
Start: 2019-09-27 | End: 2020-01-23

## 2019-10-11 ENCOUNTER — HOSPITAL ENCOUNTER (OUTPATIENT)
Dept: CT IMAGING | Age: 79
Discharge: HOME | End: 2019-10-11
Payer: MEDICARE

## 2019-10-11 DIAGNOSIS — J44.9 COPD, SEVERE (HCC): ICD-10-CM

## 2019-10-11 DIAGNOSIS — R91.1 PULMONARY NODULE: ICD-10-CM

## 2019-10-11 PROCEDURE — 71250 CT THORAX DX C-: CPT

## 2019-10-17 ENCOUNTER — OFFICE VISIT (OUTPATIENT)
Dept: PULMONOLOGY | Age: 79
End: 2019-10-17
Payer: MEDICARE

## 2019-10-17 VITALS
HEART RATE: 48 BPM | BODY MASS INDEX: 31.39 KG/M2 | HEIGHT: 67 IN | SYSTOLIC BLOOD PRESSURE: 132 MMHG | DIASTOLIC BLOOD PRESSURE: 80 MMHG | WEIGHT: 200 LBS | OXYGEN SATURATION: 94 %

## 2019-10-17 DIAGNOSIS — R91.1 PULMONARY NODULE: Primary | ICD-10-CM

## 2019-10-17 DIAGNOSIS — J44.9 STAGE 3 SEVERE COPD BY GOLD CLASSIFICATION (HCC): ICD-10-CM

## 2019-10-17 PROCEDURE — G8926 SPIRO NO PERF OR DOC: HCPCS | Performed by: INTERNAL MEDICINE

## 2019-10-17 PROCEDURE — 3023F SPIROM DOC REV: CPT | Performed by: INTERNAL MEDICINE

## 2019-10-17 PROCEDURE — 99214 OFFICE O/P EST MOD 30 MIN: CPT | Performed by: INTERNAL MEDICINE

## 2019-10-17 PROCEDURE — G8417 CALC BMI ABV UP PARAM F/U: HCPCS | Performed by: INTERNAL MEDICINE

## 2019-10-17 PROCEDURE — G8482 FLU IMMUNIZE ORDER/ADMIN: HCPCS | Performed by: INTERNAL MEDICINE

## 2019-10-17 PROCEDURE — 1123F ACP DISCUSS/DSCN MKR DOCD: CPT | Performed by: INTERNAL MEDICINE

## 2019-10-17 PROCEDURE — G8427 DOCREV CUR MEDS BY ELIG CLIN: HCPCS | Performed by: INTERNAL MEDICINE

## 2019-10-17 PROCEDURE — 4040F PNEUMOC VAC/ADMIN/RCVD: CPT | Performed by: INTERNAL MEDICINE

## 2019-10-17 PROCEDURE — 1036F TOBACCO NON-USER: CPT | Performed by: INTERNAL MEDICINE

## 2019-10-24 DIAGNOSIS — F33.1 MODERATE EPISODE OF RECURRENT MAJOR DEPRESSIVE DISORDER (HCC): ICD-10-CM

## 2019-10-24 DIAGNOSIS — R53.83 FATIGUE, UNSPECIFIED TYPE: ICD-10-CM

## 2019-10-24 RX ORDER — ESCITALOPRAM OXALATE 10 MG/1
TABLET ORAL
Qty: 30 TABLET | Refills: 2 | Status: SHIPPED | OUTPATIENT
Start: 2019-10-24 | End: 2020-02-20

## 2019-12-26 ENCOUNTER — OFFICE VISIT (OUTPATIENT)
Dept: FAMILY MEDICINE CLINIC | Age: 79
End: 2019-12-26
Payer: MEDICARE

## 2019-12-26 VITALS — SYSTOLIC BLOOD PRESSURE: 125 MMHG | DIASTOLIC BLOOD PRESSURE: 58 MMHG | HEART RATE: 57 BPM | OXYGEN SATURATION: 93 %

## 2019-12-26 DIAGNOSIS — N40.1 BENIGN PROSTATIC HYPERPLASIA WITH LOWER URINARY TRACT SYMPTOMS, SYMPTOM DETAILS UNSPECIFIED: ICD-10-CM

## 2019-12-26 DIAGNOSIS — Z86.73 HISTORY OF CVA (CEREBROVASCULAR ACCIDENT): ICD-10-CM

## 2019-12-26 DIAGNOSIS — Z85.118 HISTORY OF LUNG CANCER: ICD-10-CM

## 2019-12-26 DIAGNOSIS — J43.1 PANLOBULAR EMPHYSEMA (HCC): ICD-10-CM

## 2019-12-26 DIAGNOSIS — I10 ESSENTIAL HYPERTENSION: Primary | ICD-10-CM

## 2019-12-26 DIAGNOSIS — F32.9 REACTIVE DEPRESSION: ICD-10-CM

## 2019-12-26 DIAGNOSIS — E78.5 HYPERLIPIDEMIA WITH TARGET LDL LESS THAN 130: ICD-10-CM

## 2019-12-26 PROCEDURE — G8926 SPIRO NO PERF OR DOC: HCPCS | Performed by: NURSE PRACTITIONER

## 2019-12-26 PROCEDURE — 1036F TOBACCO NON-USER: CPT | Performed by: NURSE PRACTITIONER

## 2019-12-26 PROCEDURE — 1123F ACP DISCUSS/DSCN MKR DOCD: CPT | Performed by: NURSE PRACTITIONER

## 2019-12-26 PROCEDURE — 4040F PNEUMOC VAC/ADMIN/RCVD: CPT | Performed by: NURSE PRACTITIONER

## 2019-12-26 PROCEDURE — G8417 CALC BMI ABV UP PARAM F/U: HCPCS | Performed by: NURSE PRACTITIONER

## 2019-12-26 PROCEDURE — 3023F SPIROM DOC REV: CPT | Performed by: NURSE PRACTITIONER

## 2019-12-26 PROCEDURE — 36415 COLL VENOUS BLD VENIPUNCTURE: CPT | Performed by: NURSE PRACTITIONER

## 2019-12-26 PROCEDURE — G8427 DOCREV CUR MEDS BY ELIG CLIN: HCPCS | Performed by: NURSE PRACTITIONER

## 2019-12-26 PROCEDURE — G8482 FLU IMMUNIZE ORDER/ADMIN: HCPCS | Performed by: NURSE PRACTITIONER

## 2019-12-26 PROCEDURE — 99214 OFFICE O/P EST MOD 30 MIN: CPT | Performed by: NURSE PRACTITIONER

## 2019-12-26 ASSESSMENT — ENCOUNTER SYMPTOMS
GASTROINTESTINAL NEGATIVE: 1
EYES NEGATIVE: 1
RESPIRATORY NEGATIVE: 1

## 2019-12-27 LAB
A/G RATIO: 1.6 (ref 1.1–2.2)
ALBUMIN SERPL-MCNC: 3.9 G/DL (ref 3.4–5)
ALP BLD-CCNC: 63 U/L (ref 40–129)
ALT SERPL-CCNC: 16 U/L (ref 10–40)
ANION GAP SERPL CALCULATED.3IONS-SCNC: 14 MMOL/L (ref 3–16)
AST SERPL-CCNC: 19 U/L (ref 15–37)
BILIRUB SERPL-MCNC: 0.4 MG/DL (ref 0–1)
BUN BLDV-MCNC: 17 MG/DL (ref 7–20)
CALCIUM SERPL-MCNC: 9.6 MG/DL (ref 8.3–10.6)
CHLORIDE BLD-SCNC: 95 MMOL/L (ref 99–110)
CHOLESTEROL, TOTAL: 119 MG/DL (ref 0–199)
CO2: 31 MMOL/L (ref 21–32)
CREAT SERPL-MCNC: 0.8 MG/DL (ref 0.8–1.3)
GFR AFRICAN AMERICAN: >60
GFR NON-AFRICAN AMERICAN: >60
GLOBULIN: 2.4 G/DL
GLUCOSE BLD-MCNC: 110 MG/DL (ref 70–99)
HDLC SERPL-MCNC: 53 MG/DL (ref 40–60)
LDL CHOLESTEROL CALCULATED: 47 MG/DL
POTASSIUM SERPL-SCNC: 3.5 MMOL/L (ref 3.5–5.1)
PROSTATE SPECIFIC ANTIGEN: 0.49 NG/ML (ref 0–4)
SODIUM BLD-SCNC: 140 MMOL/L (ref 136–145)
TOTAL PROTEIN: 6.3 G/DL (ref 6.4–8.2)
TRIGL SERPL-MCNC: 96 MG/DL (ref 0–150)
VLDLC SERPL CALC-MCNC: 19 MG/DL

## 2020-01-16 RX ORDER — TAMSULOSIN HYDROCHLORIDE 0.4 MG/1
CAPSULE ORAL
Qty: 60 CAPSULE | Refills: 0 | Status: SHIPPED | OUTPATIENT
Start: 2020-01-16 | End: 2020-04-10

## 2020-01-23 RX ORDER — HYDROCHLOROTHIAZIDE 25 MG/1
TABLET ORAL
Qty: 30 TABLET | Refills: 2 | Status: SHIPPED | OUTPATIENT
Start: 2020-01-23 | End: 2020-05-14

## 2020-01-23 RX ORDER — ATORVASTATIN CALCIUM 20 MG/1
TABLET, FILM COATED ORAL
Qty: 30 TABLET | Refills: 2 | Status: SHIPPED | OUTPATIENT
Start: 2020-01-23 | End: 2020-05-20

## 2020-02-20 RX ORDER — ESCITALOPRAM OXALATE 10 MG/1
TABLET ORAL
Qty: 30 TABLET | Refills: 5 | Status: SHIPPED | OUTPATIENT
Start: 2020-02-20

## 2020-02-20 RX ORDER — ESCITALOPRAM OXALATE 10 MG/1
TABLET ORAL
Qty: 30 TABLET | Refills: 0 | OUTPATIENT
Start: 2020-02-20

## 2020-02-20 RX ORDER — FINASTERIDE 5 MG/1
TABLET, FILM COATED ORAL
Qty: 30 TABLET | Refills: 5 | Status: SHIPPED | OUTPATIENT
Start: 2020-02-20

## 2020-02-20 NOTE — TELEPHONE ENCOUNTER
Refilled medication per verbal order from provider.   Future appt scheduled 06/25/2020  Last appt 12/26/2019

## 2020-03-12 RX ORDER — LISINOPRIL 10 MG/1
TABLET ORAL
Qty: 90 TABLET | Refills: 1 | Status: SHIPPED | OUTPATIENT
Start: 2020-03-12 | End: 2020-12-09 | Stop reason: CLARIF

## 2020-03-17 RX ORDER — FLUTICASONE FUROATE AND VILANTEROL TRIFENATATE 100; 25 UG/1; UG/1
POWDER RESPIRATORY (INHALATION)
Qty: 1 EACH | Refills: 5 | Status: SHIPPED | OUTPATIENT
Start: 2020-03-17

## 2020-04-10 RX ORDER — TAMSULOSIN HYDROCHLORIDE 0.4 MG/1
CAPSULE ORAL
Qty: 60 CAPSULE | Refills: 0 | Status: SHIPPED | OUTPATIENT
Start: 2020-04-10

## 2020-04-20 ENCOUNTER — TELEPHONE (OUTPATIENT)
Dept: FAMILY MEDICINE CLINIC | Age: 80
End: 2020-04-20

## 2020-04-20 NOTE — TELEPHONE ENCOUNTER
Patient would like to have you call him. He said him and his wife are needing some help in the home due to their medical conditions.

## 2020-04-22 NOTE — TELEPHONE ENCOUNTER
Spoke with Shaun Jean in the Resource dept at [facility] ( ph# [phone]) and they will contact patient to see if they can be of assistance

## 2020-05-04 RX ORDER — ALBUTEROL SULFATE 90 UG/1
AEROSOL, METERED RESPIRATORY (INHALATION)
Qty: 18 G | Refills: 5 | Status: SHIPPED | OUTPATIENT
Start: 2020-05-04

## 2020-05-07 ENCOUNTER — TELEPHONE (OUTPATIENT)
Dept: FAMILY MEDICINE CLINIC | Age: 80
End: 2020-05-07

## 2020-05-11 ENCOUNTER — TELEPHONE (OUTPATIENT)
Dept: FAMILY MEDICINE CLINIC | Age: 80
End: 2020-05-11

## 2020-05-11 ENCOUNTER — VIRTUAL VISIT (OUTPATIENT)
Dept: FAMILY MEDICINE CLINIC | Age: 80
End: 2020-05-11
Payer: MEDICARE

## 2020-05-11 PROCEDURE — G8427 DOCREV CUR MEDS BY ELIG CLIN: HCPCS | Performed by: NURSE PRACTITIONER

## 2020-05-11 PROCEDURE — 99214 OFFICE O/P EST MOD 30 MIN: CPT | Performed by: NURSE PRACTITIONER

## 2020-05-11 PROCEDURE — 1123F ACP DISCUSS/DSCN MKR DOCD: CPT | Performed by: NURSE PRACTITIONER

## 2020-05-11 PROCEDURE — 4040F PNEUMOC VAC/ADMIN/RCVD: CPT | Performed by: NURSE PRACTITIONER

## 2020-05-11 RX ORDER — IPRATROPIUM BROMIDE AND ALBUTEROL SULFATE 2.5; .5 MG/3ML; MG/3ML
1 SOLUTION RESPIRATORY (INHALATION) EVERY 6 HOURS PRN
Qty: 360 ML | Refills: 1 | Status: SHIPPED | OUTPATIENT
Start: 2020-05-11 | End: 2020-05-11 | Stop reason: SDUPTHER

## 2020-05-11 RX ORDER — PREDNISONE 10 MG/1
TABLET ORAL
Qty: 30 TABLET | Refills: 0 | Status: SHIPPED | OUTPATIENT
Start: 2020-05-11 | End: 2020-05-23

## 2020-05-11 RX ORDER — IPRATROPIUM BROMIDE AND ALBUTEROL SULFATE 2.5; .5 MG/3ML; MG/3ML
1 SOLUTION RESPIRATORY (INHALATION) EVERY 6 HOURS PRN
Qty: 360 ML | Refills: 1 | Status: SHIPPED | OUTPATIENT
Start: 2020-05-11 | End: 2020-09-06

## 2020-05-11 RX ORDER — DOXYCYCLINE HYCLATE 100 MG
100 TABLET ORAL 2 TIMES DAILY
Qty: 20 TABLET | Refills: 0 | Status: SHIPPED | OUTPATIENT
Start: 2020-05-11 | End: 2020-05-21

## 2020-05-11 ASSESSMENT — ENCOUNTER SYMPTOMS
EYES NEGATIVE: 1
WHEEZING: 1
COUGH: 1
SHORTNESS OF BREATH: 1
GASTROINTESTINAL NEGATIVE: 1

## 2020-05-11 NOTE — PROGRESS NOTES
2020    TELEHEALTH EVALUATION -- Audio/Visual (During LDODJ-26 public health emergency)  Chief Complaint   Patient presents with    Shortness of Breath    Extremity Weakness       HPI:    Chana Chaudhari (:  1940) has requested an audio/video evaluation for the following concern(s):    COPD:  Current treatment includes short-acting beta agonist inhaler, combined beta agonist/steroid inhaler. Residual symptoms: chronic dyspnea, cough productive of clear sputum in moderate amounts and wheezing. He denies purulent sputum, fever, purulent nasal discharge. He requires his rescue inhaler every 4-6 hours per day. Review of Systems   Constitutional: Positive for fatigue. Negative for appetite change, chills and fever. HENT: Positive for congestion. Eyes: Negative. Respiratory: Positive for cough, shortness of breath and wheezing. Gastrointestinal: Negative. Endocrine: Negative. Genitourinary: Negative. Musculoskeletal: Positive for gait problem (generalized weakness). Skin: Negative. Neurological: Positive for weakness. Psychiatric/Behavioral: Negative. Prior to Visit Medications    Medication Sig Taking?  Authorizing Provider   albuterol sulfate  (90 Base) MCG/ACT inhaler INHALE 2 PUFFS EVERY 6 HOURS AS NEEDED  Abdias Smallwood MD   tamsulosin (FLOMAX) 0.4 MG capsule TAKE 2 CAPSULES AT BEDTIME  SONAL Mars CNP   fluticasone-vilanterol (BREO ELLIPTA) 100-25 MCG/INH AEPB inhaler INHALE 1 PUFF Jessica Heredia MD   lisinopril (PRINIVIL;ZESTRIL) 10 MG tablet TAKE (1) TABLET DAILY  GloSONAL Arriaga CNP   escitalopram (LEXAPRO) 10 MG tablet TAKE (1) TABLET DAILY  Glorine SONAL Ohara CNP   finasteride (PROSCAR) 5 MG tablet TAKE (1) TABLET DAILY  Glorine SONAL Ohara CNP   hydrochlorothiazide (HYDRODIURIL) 25 MG tablet TAKE (1) TABLET DAILY  SONAL Mars CNP   atorvastatin (LIPITOR) 20 MG tablet TAKE (1) TABLET DAILY Asymmetry (Cranial nerve 7 motor function) (limited exam to video visit)          [x] No gaze palsy        [] Abnormal-         Skin:        [x] No significant exanthematous lesions or discoloration noted on facial skin         [] Abnormal-            Psychiatric:       [x] Normal Affect [x] No Hallucinations        [] Abnormal-     Other pertinent observable physical exam findings-     ASSESSMENT/PLAN:  1. Chronic obstructive pulmonary disease with acute exacerbation ([facility] [address]  Patient presents today with complaints of a 2-week history of increased shortness of breath, productive cough of clear phlegm, wheezing, activity intolerance, decreased oxygen saturation and generalized weakness. Patient with personal history of COPD, HTN, A. Fib, Hyperlipidemia, CVA and reactive depression. Pulse ox 84% on room air and 94% with oxygen 2L/NC. Patient does appear ill however note toxic. No respiratory distress noted. I suspect exacerbation of COPD and recommend treatment as below. Recommend home health services as patient is home bound related to sob and needs assistance to ambulate (one person plus wheel chair or walker). Patient also has need for health services due to debility, generalized weakness, needs assistance with personal care and skilled nursing to monitor cardiopulmonary status. Patient/family in agreement. Orders as below. At least 25 min of face to face virtual time with patient and more than 50% if this time was dedicated to counseling patient on COPD and management. Encouraged to call with any problems or concerns. - doxycycline hyclate (VIBRA-TABS) 100 MG tablet; Take 1 tablet by mouth 2 times daily for 10 days  Dispense: 20 tablet; Refill: 0  - predniSONE (DELTASONE) 10 MG tablet; Take 4 tablets by mouth daily for 3 days, THEN 3 tablets daily for 3 days, THEN 2 tablets daily for 3 days, THEN 1 tablet daily for 3 days. Dispense: 30 tablet;  Refill: 0  - ipratropium-albuterol (DUONEB) 0.5-2.5 (3) MG/3ML SOLN nebulizer solution; Inhale 3 mLs into the lungs every 6 hours as needed for Shortness of Breath  Dispense: 360 mL; Refill: 1  - External Referral To Home Health    2. Exercise intolerance  See note above. - External Referral To Home Health    3. Debility  See note above. - External Referral To Home Health    4. History of CVA (cerebrovascular accident)  See note above. - External Referral To Home Health    5. Essential hypertension  Stable. - External Referral To Home Health      Return if symptoms worsen or fail to improve. Linda Reyes is a 70 y.o. male being evaluated by a Virtual Visit (video visit) encounter to address concerns as mentioned above. A caregiver was present when appropriate. Due to this being a TeleHealth encounter (During [facility]- public health emergency), evaluation of the following organ systems was limited: Vitals/Constitutional/EENT/Resp/CV/GI//MS/Neuro/Skin/Heme-Lymph-Imm. Pursuant to the emergency declaration under the [address] [place] [address], [address] [place] [address] authority and the [employer] Act, this Virtual Visit was conducted with patient's (and/or legal guardian's) consent, to reduce the patient's risk of exposure to COVID-19 and provide necessary medical care. The patient (and/or legal guardian) has also been advised to contact this office for worsening conditions or problems, and seek emergency medical treatment and/or call 911 if deemed necessary. Patient identification was verified at the start of the visit: Yes    Total time spent on this encounter: 30 min    Services were provided through a video synchronous discussion virtually to substitute for in-person clinic visit. Patient and provider were located at their individual homes. --SONAL Benton CNP on 5/11/2020 at 12:37 PM    An electronic signature was used to authenticate this note.

## 2020-05-14 RX ORDER — HYDROCHLOROTHIAZIDE 25 MG/1
TABLET ORAL
Qty: 30 TABLET | Refills: 3 | Status: SHIPPED | OUTPATIENT
Start: 2020-05-14

## 2020-05-21 RX ORDER — ATORVASTATIN CALCIUM 20 MG/1
TABLET, FILM COATED ORAL
Qty: 30 TABLET | Refills: 3 | Status: SHIPPED | OUTPATIENT
Start: 2020-05-21

## 2020-06-13 ENCOUNTER — APPOINTMENT (OUTPATIENT)
Dept: GENERAL RADIOLOGY | Age: 80
DRG: 308 | End: 2020-06-13
Payer: MEDICARE

## 2020-06-13 ENCOUNTER — HOSPITAL ENCOUNTER (INPATIENT)
Age: 80
LOS: 5 days | Discharge: NURSING, REHAB OR LONG-TERM CARE | DRG: 308 | End: 2020-06-19
Attending: EMERGENCY MEDICINE | Admitting: INTERNAL MEDICINE
Payer: MEDICARE

## 2020-06-13 ENCOUNTER — APPOINTMENT (OUTPATIENT)
Dept: CT IMAGING | Age: 80
DRG: 308 | End: 2020-06-13
Payer: MEDICARE

## 2020-06-13 LAB
A/G RATIO: 1.2 (ref 1.1–2.2)
ALBUMIN SERPL-MCNC: 3.9 G/DL (ref 3.4–5)
ALP BLD-CCNC: 70 U/L (ref 40–129)
ALT SERPL-CCNC: 11 U/L (ref 10–40)
ANION GAP SERPL CALCULATED.3IONS-SCNC: 13 MMOL/L (ref 3–16)
AST SERPL-CCNC: 19 U/L (ref 15–37)
BASOPHILS ABSOLUTE: 0.1 K/UL (ref 0–0.2)
BASOPHILS RELATIVE PERCENT: 0.4 %
BILIRUB SERPL-MCNC: 0.9 MG/DL (ref 0–1)
BUN BLDV-MCNC: 13 MG/DL (ref 7–20)
CALCIUM SERPL-MCNC: 10 MG/DL (ref 8.3–10.6)
CHLORIDE BLD-SCNC: 87 MMOL/L (ref 99–110)
CO2: 31 MMOL/L (ref 21–32)
CREAT SERPL-MCNC: 0.6 MG/DL (ref 0.8–1.3)
EOSINOPHILS ABSOLUTE: 0.1 K/UL (ref 0–0.6)
EOSINOPHILS RELATIVE PERCENT: 1 %
GFR AFRICAN AMERICAN: >60
GFR NON-AFRICAN AMERICAN: >60
GLOBULIN: 3.3 G/DL
GLUCOSE BLD-MCNC: 162 MG/DL (ref 70–99)
HCT VFR BLD CALC: 41.3 % (ref 40.5–52.5)
HEMOGLOBIN: 13.8 G/DL (ref 13.5–17.5)
LACTIC ACID: 1.5 MMOL/L (ref 0.4–2)
LYMPHOCYTES ABSOLUTE: 2.1 K/UL (ref 1–5.1)
LYMPHOCYTES RELATIVE PERCENT: 16.7 %
MCH RBC QN AUTO: 30.7 PG (ref 26–34)
MCHC RBC AUTO-ENTMCNC: 33.4 G/DL (ref 31–36)
MCV RBC AUTO: 92 FL (ref 80–100)
MONOCYTES ABSOLUTE: 1 K/UL (ref 0–1.3)
MONOCYTES RELATIVE PERCENT: 8 %
NEUTROPHILS ABSOLUTE: 9.5 K/UL (ref 1.7–7.7)
NEUTROPHILS RELATIVE PERCENT: 73.9 %
PDW BLD-RTO: 13.2 % (ref 12.4–15.4)
PLATELET # BLD: 252 K/UL (ref 135–450)
PMV BLD AUTO: 6.9 FL (ref 5–10.5)
POTASSIUM SERPL-SCNC: 3.2 MMOL/L (ref 3.5–5.1)
RBC # BLD: 4.49 M/UL (ref 4.2–5.9)
SODIUM BLD-SCNC: 131 MMOL/L (ref 136–145)
TOTAL PROTEIN: 7.2 G/DL (ref 6.4–8.2)
TROPONIN: 0.03 NG/ML
WBC # BLD: 12.9 K/UL (ref 4–11)

## 2020-06-13 PROCEDURE — 70450 CT HEAD/BRAIN W/O DYE: CPT

## 2020-06-13 PROCEDURE — 71045 X-RAY EXAM CHEST 1 VIEW: CPT

## 2020-06-13 PROCEDURE — 6360000002 HC RX W HCPCS: Performed by: EMERGENCY MEDICINE

## 2020-06-13 PROCEDURE — 84484 ASSAY OF TROPONIN QUANT: CPT

## 2020-06-13 PROCEDURE — 96368 THER/DIAG CONCURRENT INF: CPT

## 2020-06-13 PROCEDURE — 83735 ASSAY OF MAGNESIUM: CPT

## 2020-06-13 PROCEDURE — 2500000003 HC RX 250 WO HCPCS: Performed by: EMERGENCY MEDICINE

## 2020-06-13 PROCEDURE — 2580000003 HC RX 258: Performed by: EMERGENCY MEDICINE

## 2020-06-13 PROCEDURE — 80053 COMPREHEN METABOLIC PANEL: CPT

## 2020-06-13 PROCEDURE — 96375 TX/PRO/DX INJ NEW DRUG ADDON: CPT

## 2020-06-13 PROCEDURE — 96365 THER/PROPH/DIAG IV INF INIT: CPT

## 2020-06-13 PROCEDURE — 84145 PROCALCITONIN (PCT): CPT

## 2020-06-13 PROCEDURE — 6370000000 HC RX 637 (ALT 250 FOR IP): Performed by: EMERGENCY MEDICINE

## 2020-06-13 PROCEDURE — 83880 ASSAY OF NATRIURETIC PEPTIDE: CPT

## 2020-06-13 PROCEDURE — 99285 EMERGENCY DEPT VISIT HI MDM: CPT

## 2020-06-13 PROCEDURE — 83605 ASSAY OF LACTIC ACID: CPT

## 2020-06-13 PROCEDURE — 85025 COMPLETE CBC W/AUTO DIFF WBC: CPT

## 2020-06-13 PROCEDURE — 96366 THER/PROPH/DIAG IV INF ADDON: CPT

## 2020-06-13 PROCEDURE — 36415 COLL VENOUS BLD VENIPUNCTURE: CPT

## 2020-06-13 PROCEDURE — 87040 BLOOD CULTURE FOR BACTERIA: CPT

## 2020-06-13 PROCEDURE — 93005 ELECTROCARDIOGRAM TRACING: CPT | Performed by: EMERGENCY MEDICINE

## 2020-06-13 RX ORDER — DILTIAZEM HYDROCHLORIDE 5 MG/ML
15 INJECTION INTRAVENOUS ONCE
Status: COMPLETED | OUTPATIENT
Start: 2020-06-13 | End: 2020-06-13

## 2020-06-13 RX ORDER — ASPIRIN 81 MG/1
324 TABLET, CHEWABLE ORAL ONCE
Status: COMPLETED | OUTPATIENT
Start: 2020-06-14 | End: 2020-06-13

## 2020-06-13 RX ORDER — 0.9 % SODIUM CHLORIDE 0.9 %
1000 INTRAVENOUS SOLUTION INTRAVENOUS ONCE
Status: COMPLETED | OUTPATIENT
Start: 2020-06-14 | End: 2020-06-14

## 2020-06-13 RX ADMIN — DILTIAZEM HYDROCHLORIDE 5 MG/HR: 5 INJECTION INTRAVENOUS at 21:11

## 2020-06-13 RX ADMIN — CEFTRIAXONE SODIUM 1 G: 1 INJECTION, POWDER, FOR SOLUTION INTRAMUSCULAR; INTRAVENOUS at 23:59

## 2020-06-13 RX ADMIN — DILTIAZEM HYDROCHLORIDE 15 MG: 5 INJECTION INTRAVENOUS at 21:11

## 2020-06-13 RX ADMIN — AZITHROMYCIN MONOHYDRATE 500 MG: 500 INJECTION, POWDER, LYOPHILIZED, FOR SOLUTION INTRAVENOUS at 23:58

## 2020-06-13 RX ADMIN — ASPIRIN 81 MG 324 MG: 81 TABLET ORAL at 23:59

## 2020-06-13 RX ADMIN — SODIUM CHLORIDE 1000 ML: 9 INJECTION, SOLUTION INTRAVENOUS at 23:58

## 2020-06-13 ASSESSMENT — ENCOUNTER SYMPTOMS
NAUSEA: 0
VOMITING: 0
BLOOD IN STOOL: 0
VOICE CHANGE: 0
BACK PAIN: 0
WHEEZING: 0
ABDOMINAL PAIN: 0
STRIDOR: 0
SHORTNESS OF BREATH: 1
TROUBLE SWALLOWING: 0
FACIAL SWELLING: 0
COLOR CHANGE: 0
PHOTOPHOBIA: 0

## 2020-06-14 PROBLEM — J96.01 ACUTE RESPIRATORY FAILURE WITH HYPOXIA (HCC): Status: ACTIVE | Noted: 2020-06-14

## 2020-06-14 LAB
BACTERIA: ABNORMAL /HPF
BASE EXCESS VENOUS: 2.7 MMOL/L (ref -3–3)
BILIRUBIN URINE: ABNORMAL
BILIRUBIN URINE: NEGATIVE
BLOOD, URINE: ABNORMAL
BLOOD, URINE: NEGATIVE
CARBOXYHEMOGLOBIN: 3.3 % (ref 0–1.5)
CLARITY: CLEAR
CLARITY: CLEAR
COLOR: YELLOW
COLOR: YELLOW
EKG ATRIAL RATE: 104 BPM
EKG DIAGNOSIS: NORMAL
EKG Q-T INTERVAL: 304 MS
EKG QRS DURATION: 74 MS
EKG QTC CALCULATION (BAZETT): 427 MS
EKG R AXIS: 62 DEGREES
EKG T AXIS: 85 DEGREES
EKG VENTRICULAR RATE: 119 BPM
EPITHELIAL CELLS, UA: ABNORMAL /HPF (ref 0–5)
EPITHELIAL CELLS, UA: ABNORMAL /HPF (ref 0–5)
GLUCOSE BLD-MCNC: 152 MG/DL (ref 70–99)
GLUCOSE BLD-MCNC: 177 MG/DL (ref 70–99)
GLUCOSE BLD-MCNC: 188 MG/DL (ref 70–99)
GLUCOSE URINE: NEGATIVE MG/DL
GLUCOSE URINE: NEGATIVE MG/DL
HCO3 VENOUS: 27 MMOL/L (ref 23–29)
KETONES, URINE: >=80 MG/DL
KETONES, URINE: ABNORMAL MG/DL
LEUKOCYTE ESTERASE, URINE: NEGATIVE
LEUKOCYTE ESTERASE, URINE: NEGATIVE
MAGNESIUM: 1.6 MG/DL (ref 1.8–2.4)
MAGNESIUM: 1.9 MG/DL (ref 1.8–2.4)
METHEMOGLOBIN VENOUS: 0.3 %
MICROSCOPIC EXAMINATION: YES
MICROSCOPIC EXAMINATION: YES
MUCUS: ABNORMAL /LPF
MUCUS: ABNORMAL /LPF
NITRITE, URINE: NEGATIVE
NITRITE, URINE: NEGATIVE
O2 CONTENT, VEN: 17 VOL %
O2 SAT, VEN: 97 %
O2 THERAPY: ABNORMAL
PCO2, VEN: 40.5 MMHG (ref 40–50)
PERFORMED ON: ABNORMAL
PH UA: 6 (ref 5–8)
PH UA: 6 (ref 5–8)
PH VENOUS: 7.44 (ref 7.35–7.45)
PO2, VEN: 82.9 MMHG (ref 25–40)
PRO-BNP: 299 PG/ML (ref 0–449)
PROCALCITONIN: 0.05 NG/ML (ref 0–0.15)
PROTEIN UA: 30 MG/DL
PROTEIN UA: NEGATIVE MG/DL
RBC UA: ABNORMAL /HPF (ref 0–4)
RBC UA: ABNORMAL /HPF (ref 0–4)
SPECIFIC GRAVITY UA: 1.01 (ref 1–1.03)
SPECIFIC GRAVITY UA: 1.02 (ref 1–1.03)
TCO2 CALC VENOUS: 28 MMOL/L
TROPONIN: <0.01 NG/ML
TSH SERPL DL<=0.05 MIU/L-ACNC: 0.37 UIU/ML (ref 0.27–4.2)
URINE REFLEX TO CULTURE: ABNORMAL
URINE TYPE: ABNORMAL
URINE TYPE: ABNORMAL
UROBILINOGEN, URINE: 0.2 E.U./DL
UROBILINOGEN, URINE: 1 E.U./DL
WBC UA: ABNORMAL /HPF (ref 0–5)
WBC UA: ABNORMAL /HPF (ref 0–5)

## 2020-06-14 PROCEDURE — 6360000002 HC RX W HCPCS: Performed by: INTERNAL MEDICINE

## 2020-06-14 PROCEDURE — 6370000000 HC RX 637 (ALT 250 FOR IP): Performed by: INTERNAL MEDICINE

## 2020-06-14 PROCEDURE — C9113 INJ PANTOPRAZOLE SODIUM, VIA: HCPCS | Performed by: INTERNAL MEDICINE

## 2020-06-14 PROCEDURE — 36415 COLL VENOUS BLD VENIPUNCTURE: CPT

## 2020-06-14 PROCEDURE — 87086 URINE CULTURE/COLONY COUNT: CPT

## 2020-06-14 PROCEDURE — 93010 ELECTROCARDIOGRAM REPORT: CPT | Performed by: INTERNAL MEDICINE

## 2020-06-14 PROCEDURE — 84484 ASSAY OF TROPONIN QUANT: CPT

## 2020-06-14 PROCEDURE — 2580000003 HC RX 258: Performed by: INTERNAL MEDICINE

## 2020-06-14 PROCEDURE — 83735 ASSAY OF MAGNESIUM: CPT

## 2020-06-14 PROCEDURE — 2500000003 HC RX 250 WO HCPCS: Performed by: INTERNAL MEDICINE

## 2020-06-14 PROCEDURE — 84443 ASSAY THYROID STIM HORMONE: CPT

## 2020-06-14 PROCEDURE — 2700000000 HC OXYGEN THERAPY PER DAY

## 2020-06-14 PROCEDURE — U0003 INFECTIOUS AGENT DETECTION BY NUCLEIC ACID (DNA OR RNA); SEVERE ACUTE RESPIRATORY SYNDROME CORONAVIRUS 2 (SARS-COV-2) (CORONAVIRUS DISEASE [COVID-19]), AMPLIFIED PROBE TECHNIQUE, MAKING USE OF HIGH THROUGHPUT TECHNOLOGIES AS DESCRIBED BY CMS-2020-01-R: HCPCS

## 2020-06-14 PROCEDURE — 81001 URINALYSIS AUTO W/SCOPE: CPT

## 2020-06-14 PROCEDURE — 0T9B70Z DRAINAGE OF BLADDER WITH DRAINAGE DEVICE, VIA NATURAL OR ARTIFICIAL OPENING: ICD-10-PCS | Performed by: UROLOGY

## 2020-06-14 PROCEDURE — 94761 N-INVAS EAR/PLS OXIMETRY MLT: CPT

## 2020-06-14 PROCEDURE — 99291 CRITICAL CARE FIRST HOUR: CPT | Performed by: INTERNAL MEDICINE

## 2020-06-14 PROCEDURE — 6360000002 HC RX W HCPCS: Performed by: EMERGENCY MEDICINE

## 2020-06-14 PROCEDURE — 94640 AIRWAY INHALATION TREATMENT: CPT

## 2020-06-14 PROCEDURE — 2000000000 HC ICU R&B

## 2020-06-14 PROCEDURE — 82803 BLOOD GASES ANY COMBINATION: CPT

## 2020-06-14 RX ORDER — SODIUM CHLORIDE 9 MG/ML
INJECTION, SOLUTION INTRAVENOUS CONTINUOUS
Status: DISCONTINUED | OUTPATIENT
Start: 2020-06-14 | End: 2020-06-18

## 2020-06-14 RX ORDER — TAMSULOSIN HYDROCHLORIDE 0.4 MG/1
0.8 CAPSULE ORAL DAILY
Status: DISCONTINUED | OUTPATIENT
Start: 2020-06-14 | End: 2020-06-19 | Stop reason: HOSPADM

## 2020-06-14 RX ORDER — ESCITALOPRAM OXALATE 10 MG/1
10 TABLET ORAL DAILY
Status: DISCONTINUED | OUTPATIENT
Start: 2020-06-14 | End: 2020-06-19 | Stop reason: HOSPADM

## 2020-06-14 RX ORDER — BUDESONIDE AND FORMOTEROL FUMARATE DIHYDRATE 80; 4.5 UG/1; UG/1
2 AEROSOL RESPIRATORY (INHALATION) 2 TIMES DAILY
Status: DISCONTINUED | OUTPATIENT
Start: 2020-06-14 | End: 2020-06-18

## 2020-06-14 RX ORDER — M-VIT,TX,IRON,MINS/CALC/FOLIC 27MG-0.4MG
1 TABLET ORAL DAILY
Status: DISCONTINUED | OUTPATIENT
Start: 2020-06-14 | End: 2020-06-19 | Stop reason: HOSPADM

## 2020-06-14 RX ORDER — SODIUM CHLORIDE 9 MG/ML
INJECTION, SOLUTION INTRAVENOUS
Status: DISPENSED
Start: 2020-06-14 | End: 2020-06-14

## 2020-06-14 RX ORDER — PANTOPRAZOLE SODIUM 40 MG/10ML
40 INJECTION, POWDER, LYOPHILIZED, FOR SOLUTION INTRAVENOUS DAILY
Status: DISCONTINUED | OUTPATIENT
Start: 2020-06-14 | End: 2020-06-19 | Stop reason: HOSPADM

## 2020-06-14 RX ORDER — POTASSIUM CHLORIDE 750 MG/1
40 TABLET, EXTENDED RELEASE ORAL PRN
Status: DISCONTINUED | OUTPATIENT
Start: 2020-06-14 | End: 2020-06-19 | Stop reason: HOSPADM

## 2020-06-14 RX ORDER — FINASTERIDE 5 MG/1
5 TABLET, FILM COATED ORAL DAILY
Status: DISCONTINUED | OUTPATIENT
Start: 2020-06-14 | End: 2020-06-19 | Stop reason: HOSPADM

## 2020-06-14 RX ORDER — POTASSIUM CHLORIDE 750 MG/1
20 TABLET, EXTENDED RELEASE ORAL
Status: DISCONTINUED | OUTPATIENT
Start: 2020-06-14 | End: 2020-06-19 | Stop reason: HOSPADM

## 2020-06-14 RX ORDER — TRAZODONE HYDROCHLORIDE 50 MG/1
50 TABLET ORAL NIGHTLY
Status: DISCONTINUED | OUTPATIENT
Start: 2020-06-14 | End: 2020-06-19 | Stop reason: HOSPADM

## 2020-06-14 RX ORDER — ALBUTEROL SULFATE 90 UG/1
2 AEROSOL, METERED RESPIRATORY (INHALATION) EVERY 4 HOURS PRN
Status: DISCONTINUED | OUTPATIENT
Start: 2020-06-14 | End: 2020-06-14

## 2020-06-14 RX ORDER — SODIUM CHLORIDE 0.9 % (FLUSH) 0.9 %
10 SYRINGE (ML) INJECTION EVERY 12 HOURS SCHEDULED
Status: DISCONTINUED | OUTPATIENT
Start: 2020-06-14 | End: 2020-06-19 | Stop reason: HOSPADM

## 2020-06-14 RX ORDER — LACTULOSE 10 G/15ML
10 SOLUTION ORAL EVERY EVENING
Status: DISCONTINUED | OUTPATIENT
Start: 2020-06-14 | End: 2020-06-19 | Stop reason: HOSPADM

## 2020-06-14 RX ORDER — POTASSIUM CHLORIDE 7.45 MG/ML
10 INJECTION INTRAVENOUS
Status: DISPENSED | OUTPATIENT
Start: 2020-06-14 | End: 2020-06-14

## 2020-06-14 RX ORDER — MAGNESIUM SULFATE 1 G/100ML
1 INJECTION INTRAVENOUS PRN
Status: DISCONTINUED | OUTPATIENT
Start: 2020-06-14 | End: 2020-06-19 | Stop reason: HOSPADM

## 2020-06-14 RX ORDER — POTASSIUM CHLORIDE 7.45 MG/ML
10 INJECTION INTRAVENOUS PRN
Status: DISCONTINUED | OUTPATIENT
Start: 2020-06-14 | End: 2020-06-19 | Stop reason: HOSPADM

## 2020-06-14 RX ORDER — ALBUTEROL SULFATE 90 UG/1
2 AEROSOL, METERED RESPIRATORY (INHALATION)
Status: DISCONTINUED | OUTPATIENT
Start: 2020-06-14 | End: 2020-06-15

## 2020-06-14 RX ORDER — ATORVASTATIN CALCIUM 10 MG/1
20 TABLET, FILM COATED ORAL NIGHTLY
Status: DISCONTINUED | OUTPATIENT
Start: 2020-06-14 | End: 2020-06-19 | Stop reason: HOSPADM

## 2020-06-14 RX ORDER — METHYLPREDNISOLONE SODIUM SUCCINATE 40 MG/ML
40 INJECTION, POWDER, LYOPHILIZED, FOR SOLUTION INTRAMUSCULAR; INTRAVENOUS EVERY 12 HOURS
Status: DISCONTINUED | OUTPATIENT
Start: 2020-06-14 | End: 2020-06-18

## 2020-06-14 RX ORDER — POLYETHYLENE GLYCOL 3350 17 G/17G
17 POWDER, FOR SOLUTION ORAL DAILY
Status: DISCONTINUED | OUTPATIENT
Start: 2020-06-14 | End: 2020-06-19 | Stop reason: HOSPADM

## 2020-06-14 RX ORDER — MAGNESIUM SULFATE 1 G/100ML
1 INJECTION INTRAVENOUS
Status: DISPENSED | OUTPATIENT
Start: 2020-06-14 | End: 2020-06-14

## 2020-06-14 RX ORDER — ACETAMINOPHEN 325 MG/1
650 TABLET ORAL EVERY 6 HOURS PRN
Status: DISCONTINUED | OUTPATIENT
Start: 2020-06-14 | End: 2020-06-19 | Stop reason: HOSPADM

## 2020-06-14 RX ORDER — ACETAMINOPHEN 650 MG/1
650 SUPPOSITORY RECTAL EVERY 6 HOURS PRN
Status: DISCONTINUED | OUTPATIENT
Start: 2020-06-14 | End: 2020-06-19 | Stop reason: HOSPADM

## 2020-06-14 RX ORDER — POTASSIUM CHLORIDE 7.45 MG/ML
10 INJECTION INTRAVENOUS ONCE
Status: COMPLETED | OUTPATIENT
Start: 2020-06-14 | End: 2020-06-14

## 2020-06-14 RX ORDER — PANTOPRAZOLE SODIUM 40 MG/1
40 TABLET, DELAYED RELEASE ORAL
Status: DISCONTINUED | OUTPATIENT
Start: 2020-06-14 | End: 2020-06-14

## 2020-06-14 RX ORDER — ASPIRIN 81 MG/1
81 TABLET, CHEWABLE ORAL DAILY
Status: DISCONTINUED | OUTPATIENT
Start: 2020-06-14 | End: 2020-06-19 | Stop reason: HOSPADM

## 2020-06-14 RX ORDER — SODIUM CHLORIDE 0.9 % (FLUSH) 0.9 %
10 SYRINGE (ML) INJECTION PRN
Status: DISCONTINUED | OUTPATIENT
Start: 2020-06-14 | End: 2020-06-19 | Stop reason: HOSPADM

## 2020-06-14 RX ADMIN — ENOXAPARIN SODIUM 90 MG: 100 INJECTION SUBCUTANEOUS at 20:46

## 2020-06-14 RX ADMIN — POTASSIUM CHLORIDE 10 MEQ: 7.46 INJECTION, SOLUTION INTRAVENOUS at 08:36

## 2020-06-14 RX ADMIN — Medication 2 PUFF: at 15:02

## 2020-06-14 RX ADMIN — POTASSIUM CHLORIDE 10 MEQ: 7.46 INJECTION, SOLUTION INTRAVENOUS at 09:39

## 2020-06-14 RX ADMIN — Medication 2 PUFF: at 19:27

## 2020-06-14 RX ADMIN — Medication 10 ML: at 15:59

## 2020-06-14 RX ADMIN — Medication 10 ML: at 20:36

## 2020-06-14 RX ADMIN — Medication 2 PUFF: at 22:41

## 2020-06-14 RX ADMIN — Medication 2 PUFF: at 22:42

## 2020-06-14 RX ADMIN — Medication 2 PUFF: at 08:09

## 2020-06-14 RX ADMIN — MUPIROCIN: 20 OINTMENT TOPICAL at 20:40

## 2020-06-14 RX ADMIN — Medication 2 PUFF: at 11:32

## 2020-06-14 RX ADMIN — METHYLPREDNISOLONE SODIUM SUCCINATE 40 MG: 40 INJECTION, POWDER, FOR SOLUTION INTRAMUSCULAR; INTRAVENOUS at 18:28

## 2020-06-14 RX ADMIN — POTASSIUM CHLORIDE 10 MEQ: 7.46 INJECTION, SOLUTION INTRAVENOUS at 07:47

## 2020-06-14 RX ADMIN — POTASSIUM CHLORIDE 10 MEQ: 7.46 INJECTION, SOLUTION INTRAVENOUS at 10:48

## 2020-06-14 RX ADMIN — SODIUM CHLORIDE: 9 INJECTION, SOLUTION INTRAVENOUS at 06:34

## 2020-06-14 RX ADMIN — CEFTRIAXONE SODIUM 1 G: 1 INJECTION, POWDER, FOR SOLUTION INTRAMUSCULAR; INTRAVENOUS at 20:48

## 2020-06-14 RX ADMIN — ATORVASTATIN CALCIUM 20 MG: 10 TABLET, FILM COATED ORAL at 20:53

## 2020-06-14 RX ADMIN — Medication 2 PUFF: at 11:31

## 2020-06-14 RX ADMIN — MUPIROCIN: 20 OINTMENT TOPICAL at 12:12

## 2020-06-14 RX ADMIN — SODIUM CHLORIDE 75 ML/HR: 9 INJECTION, SOLUTION INTRAVENOUS at 22:02

## 2020-06-14 RX ADMIN — AZITHROMYCIN DIHYDRATE 500 MG: 500 INJECTION, POWDER, LYOPHILIZED, FOR SOLUTION INTRAVENOUS at 21:26

## 2020-06-14 RX ADMIN — PANTOPRAZOLE SODIUM 40 MG: 40 INJECTION, POWDER, FOR SOLUTION INTRAVENOUS at 12:57

## 2020-06-14 RX ADMIN — MAGNESIUM SULFATE 1 G: 1 INJECTION INTRAVENOUS at 04:40

## 2020-06-14 RX ADMIN — POTASSIUM CHLORIDE 10 MEQ: 7.46 INJECTION, SOLUTION INTRAVENOUS at 00:46

## 2020-06-14 RX ADMIN — METHYLPREDNISOLONE SODIUM SUCCINATE 40 MG: 40 INJECTION, POWDER, FOR SOLUTION INTRAMUSCULAR; INTRAVENOUS at 10:47

## 2020-06-14 RX ADMIN — DILTIAZEM HYDROCHLORIDE 5 MG/HR: 5 INJECTION INTRAVENOUS at 07:45

## 2020-06-14 RX ADMIN — METOPROLOL TARTRATE 25 MG: 25 TABLET, FILM COATED ORAL at 20:38

## 2020-06-14 RX ADMIN — MAGNESIUM SULFATE 1 G: 1 INJECTION INTRAVENOUS at 07:54

## 2020-06-14 RX ADMIN — TRAZODONE HYDROCHLORIDE 50 MG: 50 TABLET ORAL at 20:39

## 2020-06-14 ASSESSMENT — ENCOUNTER SYMPTOMS: COUGH: 1

## 2020-06-14 ASSESSMENT — PAIN SCALES - GENERAL: PAINLEVEL_OUTOF10: 0

## 2020-06-14 NOTE — PROGRESS NOTES
Writer called to attempt coude shook catheter insertion due to enlarged prostate. Writer attempted x1 and met resistance of the prostate. Pt. C/o discomfort with attempt of insertion. Updated shift HANK Barger regarding inability to advance coude catheter.  Mumtaz Russell RN Clinical Administration

## 2020-06-14 NOTE — CONSULTS
Patient:  Amna Doss  YOB: 1940   CSN:  [id number]    Referring Provider:  Annette Manzo  Primary Care Provider:  SONAL Melo CNP       Urology Attending Consult Note     Reason for Consultation:  Urinary retention,   unable to place Talbot    Chief Complaint: \"i'm not urinating much\"  HPI:  Lazaro Christie is a 70 y.o. male who presented with 1 day history of Difficulty urinating. At home he is also having trouble blurred breathing.,  Weakness. Came to the emergency department he was admitted for respiratory insufficiency. Nursing was unable to place a Talbot catheter to call us for assistance.     Ct reviewed as below    Past Medical History:   Diagnosis Date    BPH     Bronchitis chronic     Cerebral artery occlusion with cerebral infarction ([address]     Emphysema of lung ([address]     Hyperlipidemia     Hypertension     [facility]     Pneumonia     T2 vertebral fracture ([facility] [address]        Past Surgical History:   Procedure Laterality Date    ANKLE FRACTURE SURGERY      COLONOSCOPY  05/07/2013    Diverticulosis    LUNG REMOVAL, PARTIAL  May 2009    resection of lung cancer       Medication List reviewed:      Current Facility-Administered Medications   Medication Dose Route Frequency Provider Last Rate Last Dose    aspirin chewable tablet 81 mg  81 mg Oral Daily Astrid Ospina MD        atorvastatin (LIPITOR) tablet 20 mg  20 mg Oral Nightly Astrid Ospina MD        escitalopram (LEXAPRO) tablet 10 mg  10 mg Oral Daily Astrid Ospina MD        finasteride (PROSCAR) tablet 5 mg  5 mg Oral Daily Astrid Ospina MD        cefTRIAXone (ROCEPHIN) 1 g IVPB in 50 mL D5W minibag  1 g Intravenous Q24H Astrid Ospina MD        azithromycin (ZITHROMAX) 500 mg in D5W 250ml addavial  500 mg Intravenous Q24H Astrid Ospina MD        magnesium sulfate 1 g in dextrose 5% 100 mL IVPB  1 g Intravenous PRN Astrid Ospnia MD   Stopped at 06/14/20 0911    potassium chloride (KLOR-CON M) extended release tablet 40 mEq  40 mEq Oral PRN Lazarus Coop, MD        Or    potassium bicarb-citric acid (EFFER-K) effervescent tablet 40 mEq  40 mEq Oral PRN Lazarus Coop, MD        Or    potassium chloride 10 mEq/100 mL IVPB (Peripheral Line)  10 mEq Intravenous PRN Lazarus Coop,  mL/hr at 06/14/20 1048 10 mEq at 06/14/20 1048    sodium chloride flush 0.9 % injection 10 mL  10 mL Intravenous 2 times per day Lazarus Coop, MD        sodium chloride flush 0.9 % injection 10 mL  10 mL Intravenous PRN Lazarus Coop, MD        acetaminophen (TYLENOL) tablet 650 mg  650 mg Oral Q6H PRN Lazarus Coop, MD        Or   [facility] acetaminophen (TYLENOL) suppository 650 mg  650 mg Rectal Q6H PRN Lazarus Coop, MD        enoxaparin (LOVENOX) injection 90 mg  1 mg/kg Subcutaneous BID Lazarus Coop, MD        budesonide-formoterol [facility]) 80-4.5 MCG/ACT inhaler 2 puff  2 puff Inhalation BID Lazarus Coop, MD   2 puff at 06/14/20 0809    lactulose (CHRONULAC) 10 GM/15ML solution 10 g  10 g Oral QPM Lazarus Coop, MD        metoprolol tartrate (LOPRESSOR) tablet 25 mg  25 mg Oral BID Lazarus Coop, MD        therapeutic multivitamin-minerals 1 tablet  1 tablet Oral Daily Lazarus Coop, MD        polyethylene glycol [facility]) packet 17 g  17 g Oral Daily Lazarus Coop, MD        potassium chloride (KLOR-CON M) extended release tablet 20 mEq  20 mEq Oral Daily with breakfast Lazarus Coop, MD        tamsulosin [facility]) capsule 0.8 mg  0.8 mg Oral Daily Lazarus Coop, MD        traZODone (DESYREL) tablet 50 mg  50 mg Oral Nightly Lazarus Coop, MD        [START ON 6/15/2020] perflutren lipid microspheres (DEFINITY) injection 1.65 mg  1.5 mL Intravenous ONCE PRN Lazarus Coop, MD        0.9 % sodium chloride infusion   Intravenous Continuous Lazarus Coop, MD 75 mL/hr at 06/14/20 0634      methylPREDNISolone sodium (SOLU-MEDROL) injection 40 mg 40 mg Intravenous Q12H Bee Hernandez MD   40 mg at 20 1047    sodium chloride 0.9 % infusion             sodium chloride 0.9 % infusion             albuterol sulfate  (90 Base) MCG/ACT inhaler 2 puff  2 puff Inhalation Q4H NORBERTO Hinkle MD   2 puff at 20 1131    ipratropium (ATROVENT HFA) 17 MCG/ACT inhaler 2 puff  2 puff Inhalation Q4H NORBERTO Hinkle MD   2 puff at 20 1132    mupirocin (BACTROBAN) 2 % ointment   Nasal BID Calvin Hinkle MD        pantoprazole (PROTONIX) injection 40 mg  40 mg Intravenous Daily Calvin Hinkle MD   40 mg at 20 1257    dilTIAZem 125 mg in dextrose 5 % 125 mL infusion  5 mg/hr Intravenous Continuous Bee Hernandez MD 5 mL/hr at 20 0745 5 mg/hr at 20 0745       No Known Allergies    Family History   Problem Relation Age of Onset    Emphysema Mother     Cancer Brother         lung cancer    Asthma Neg Hx     Heart Failure Neg Hx        Social History     Tobacco Use    Smoking status: Former Smoker     Packs/day: 1.00     Years: 52.00     Pack years: 52.00     Types: Cigarettes     Last attempt to quit: 2000     Years since quittin.1    Smokeless tobacco: Never Used   Substance Use Topics    Alcohol use: Not Currently     Alcohol/week: 0.0 standard drinks     Comment: occasionally    Drug use: No         Review of Systems: A 12 point ROS was performed and was unremarkable unless listed in the history of present illness. I/O last 3 completed shifts:   In: 1350 [IV Piggyback:1350]  Out: -     Physical Exam:  Patient Vitals for the past 8 hrs:   BP Temp Temp src Pulse Resp SpO2   20 1300 (!) 148/64 -- -- 81 20 94 %   20 1200 (!) 185/60 98.2 °F (36.8 °C) Oral 85 22 96 %   20 1150 -- -- -- -- -- 94 %   20 1100 (!) 134/59 -- -- 81 16 93 %   20 1000 (!) 159/63 -- -- 93 22 96 %   20 0911 (!) 154/63 -- -- 84 16 93 %   20 0820 -- -- -- -- -- 93 %   20 0803 (!) 155/97 97.6 °F (36.4 °C) Oral 81 18 92 %     Constitutional: pleasant male in NAD, with a normal body habitus   EENT: pink conjunctivae, lips without cyanosis, normal appearance of ears and nose  Neck: no masses or lesions, trachea midline   Respiratory: normal respiratory movements without distress   Cardiovascular: regular rate and rhythm, lower extremities without edema   Abdomen: soft, NTND, no masses, no guarding  Skin: warm and dry, no rashes, lesions, or ulcers  Musculoskeletal: normal ROM, m. tone, no digital cyanosis, head normocephalic  Psych: normal mood and affect, alert and appropriately answers questions  : normal penile shaft without evidence of masses, urethral meatus patent, normal testis/scrotum, nontender  PERRY: deferred    Labs:     Lab Results   Component Value Date    PSA 0.49 12/26/2019    PSA 0.45 01/04/2019    PSA 2.52 05/18/2016     Lab Results   Component Value Date    PSA 0.49 12/26/2019    PSA 0.45 01/04/2019    PSA 2.52 05/18/2016    PSADIA 1.84 11/19/2014     Recent Labs     06/13/20 2025   WBC 12.9*   HGB 13.8   HCT 41.3   MCV 92.0        No results found for: LABA1C  Lab Results   Component Value Date    LABMICR YES 06/14/2020    LDLCALC 47 12/26/2019    CREATININE 0.6 (L) 06/13/2020     Lab Results   Component Value Date     (L) 06/13/2020    K 3.2 (L) 06/13/2020    CL 87 (L) 06/13/2020    CO2 31 06/13/2020    BUN 13 06/13/2020    CREATININE 0.6 (L) 06/13/2020    GLUCOSE 162 (H) 06/13/2020    CALCIUM 10.0 06/13/2020    PROT 7.2 06/13/2020    LABALBU 3.9 06/13/2020    BILITOT 0.9 06/13/2020    ALKPHOS 70 06/13/2020    AST 19 06/13/2020    ALT 11 06/13/2020    LABGLOM >60 06/13/2020    GFRAA >60 06/13/2020    AGRATIO 1.2 06/13/2020    GLOB 3.3 06/13/2020     Lab Results   Component Value Date    CREATININE 0.6 (L) 06/13/2020    CREATININE 0.8 12/26/2019    CREATININE 0.6 (L) 09/24/2019     Lab Results   Component Value Date    COLORU Yellow 06/14/2020    NITRU Negative 06/14/2020

## 2020-06-14 NOTE — H&P
Hospital Medicine History & Physical      PCP: Marye Landau, APRN - CNP    Date of Service: Pt seen/examined on 6/14/20 and admitted on 6/14/20 to Inpatient. Chief Complaint   Patient presents with    Shortness of Breath     Pt in per squad, reports of possible syncopal episode at home, SOB, pale, A-Fib RVR. Pt c/o \"being weak\" x 3 days. Solumedrol 125 and duoneb adm by squad       History Of Present Illness: The patient is a 70 y.o. male with PMH below, presents with SOB, generalized weakness/fatigue, lightheadedness, near syncope, palpitations. Patient reports that over the last 3 days he has become increasingly weak. He has had associated lightheadedness and felt like he was going to pass out. It has also had associated palpitations and shortness of breath with dyspnea on exertion. He is concerned that he might fall and get injured. He is normally on 2L O2 ON only for COPD but has been requiring during day couple of days. 2-3 L since arrival in the ER for borderline oxygen saturations. He was also found to be in A. fib with RVR. He denies a history of A. fib. Lastly, he was found to have a probable left lower lobe pneumonia. Patient does have a history of COPD. Of note, he has a history of intracranial hemorrhage approximately 2 years ago with residual left upper extremity weakness. He denies any new focal neurologic deficit. He reports that he frequently chokes when he eats/drinks. He has required \"stretching of his throat\" multiple times in the past.  He cannot remember the name of his ENT.       Past Medical History:        Diagnosis Date    BPH     Bronchitis chronic     Cerebral artery occlusion with cerebral infarction ([address]     Emphysema of lung ([address]     Hyperlipidemia     Hypertension     Lung cancer ([address]     Pneumonia     T2 vertebral fracture ([address]        Past Surgical History:        Procedure Laterality Date    ANKLE FRACTURE SURGERY      COLONOSCOPY 05/07/2013    Diverticulosis    LUNG REMOVAL, PARTIAL  May 2009    resection of lung cancer       Medications Prior to Admission:    Prior to Admission medications    Medication Sig Start Date End Date Taking? Authorizing Provider   atorvastatin (LIPITOR) 20 MG tablet TAKE (1) TABLET DAILY 5/21/20  Yes SONAL Hough CNP   metoprolol tartrate (LOPRESSOR) 25 MG tablet TAKE 1 TABLET TWICE DAILY 5/21/20  Yes SONAL Hough CNP   hydroCHLOROthiazide (HYDRODIURIL) 25 MG tablet TAKE (1) TABLET DAILY 5/14/20  Yes SONAL Hough CNP   Nebulizers (COMPRESSOR/NEBULIZER) MISC Nebulizer as directed.  Diagnosis: Chronic COPD with acute exacerbation 5/11/20  Yes SONAL Hough CNP   albuterol sulfate  (90 Base) MCG/ACT inhaler INHALE 2 PUFFS EVERY 6 HOURS AS NEEDED 5/4/20  Yes Fidel Flanagan MD   tamsulosin (FLOMAX) 0.4 MG capsule TAKE 2 CAPSULES AT BEDTIME 4/10/20  Yes SONAL Hough CNP   fluticasone-vilanterol (BREO ELLIPTA) 100-25 MCG/INH AEPB inhaler INHALE 1 PUFF DAILY 3/17/20  Yes Fidel Flanagan MD   lisinopril (PRINIVIL;ZESTRIL) 10 MG tablet TAKE (1) TABLET DAILY 3/12/20  Yes SONAL Hough CNP   escitalopram (LEXAPRO) 10 MG tablet TAKE (1) TABLET DAILY 2/20/20  Yes SONAL Hough CNP   finasteride (PROSCAR) 5 MG tablet TAKE (1) TABLET DAILY 2/20/20  Yes SONAL Hough CNP   fluticasone [facility]) 50 MCG/ACT nasal spray USE 2 SPRAYS INTO EACH NOSTRIL DAILY 9/24/19  Yes SONAL Hough CNP   traZODone (DESYREL) 50 MG tablet Take 1 tablet by mouth nightly 9/24/19  Yes SONAL Hough CNP   Multiple Vitamins-Minerals (THERAPEUTIC MULTIVITAMIN-MINERALS) tablet Take 1 tablet by mouth daily   Yes Historical Provider, MD   pantoprazole (PROTONIX) 40 MG tablet TAKE (1) TABLET DAILY 1/4/19  Yes Jeanine Allen MD   aspirin 81 MG tablet Take 81 mg by mouth daily   Yes Historical Provider, MD   polyethylene use.  Speaking in 3-5 word sentences. Tachypnea. ABD:  Soft, ND/NT, BS+ x4. Without G/R.   EXT: 2+ pulses, no c/c/e. Brisk cap refill. PSY:  Thought process intact, affect appropriate. HE:  CN III-XII intact, moves all 4 spontaneously, sensory grossly intact. Strength is 4+ out of 5 of left upper extremity and 5 out of 5 elsewhere. SKIN: No rash or lesions on visible skin. Chart review shows recent radiographs:  Ct Head Wo Contrast    Result Date: 6/13/2020  EXAMINATION: CT OF THE HEAD WITHOUT CONTRAST  6/13/2020 8:48 pm TECHNIQUE: CT of the head was performed without the administration of intravenous contrast. Dose modulation, iterative reconstruction, and/or weight based adjustment of the mA/kV was utilized to reduce the radiation dose to as low as reasonably achievable. COMPARISON: CT head 03/26/2018 HISTORY: ORDERING SYSTEM PROVIDED HISTORY: weakness for three days, hx of head bleed TECHNOLOGIST PROVIDED HISTORY: Reason for exam:->weakness for three days, hx of head bleed Has a \"code stroke\" or \"stroke alert\" been called? ->No Reason for Exam: sob, fatigue, hx of brain bleed Acuity: Acute Type of Exam: Initial FINDINGS: BRAIN/VENTRICLES: There is no acute intracranial hemorrhage, mass effect or midline shift. No abnormal extra-axial fluid collection. The gray-white differentiation is maintained without evidence of an acute infarct. There is prominence of the ventricles and sulci due to global parenchymal volume loss. There are nonspecific areas of hypoattenuation within the periventricular and subcortical white matter, which likely represent chronic microvascular ischemic change. ORBITS: The visualized portion of the orbits demonstrate no acute abnormality. SINUSES: The visualized paranasal sinuses and mastoid air cells demonstrate no acute abnormality. SOFT TISSUES/SKULL: No acute abnormality of the visualized skull or soft tissues. No acute intracranial abnormality.      Xr Chest Portable    Result Date: 6/13/2020  EXAMINATION: ONE XRAY VIEW OF THE CHEST 6/13/2020 8:39 pm COMPARISON: 03/22/2018. HISTORY: ORDERING SYSTEM PROVIDED HISTORY: SOB TECHNOLOGIST PROVIDED HISTORY: Reason for exam:->SOB Reason for Exam: sob Acuity: Acute Type of Exam: Initial FINDINGS: The cardiac silhouette is at the upper limits of normal in size. Vascular calcifications are noted along the aortic arch. There is pulmonary vascular congestion, increased. There is a new left basilar lung consolidation which may be related to atelectasis with associated pleural effusion. Superimposed pneumonia cannot be excluded. Follow-up radiographs are recommended. The visualized osseous structures are unremarkable. 1. New left basilar lung infiltrate which may be related to atelectasis with associated pleural effusion. Pneumonia cannot be excluded. 2. Mild pulmonary vascular congestion, increased. EKG 12 Lead [[id number]]    Collected: 06/13/20 2031    Updated: 06/13/20 2042     Ventricular Rate 119 BPM    Atrial Rate 104 BPM    QRS Duration 74 ms    Q-T Interval 304 ms    QTc Calculation (Bazett) 427 ms    R Axis 62 degrees    T Axis 85 degrees    Diagnosis Atrial fibrillation with rapid ventricular responseCannot rule out Anterior infarct , age undeterminedAbnormal ECGWhen compared with ECG of 02-MAR-2019 10:34,Atrial fibrillation has replaced Sinus rhythmVent. rate has increased BY  55 BPMST now depressed in Inferior leadsST now depressed in Anterolateral leads   ST depression noted in inferior leads possibly rate dependent.       CBC:  Recent Labs     06/13/20 2025   WBC 12.9*   HGB 13.8   HCT 41.3           RENAL  Recent Labs     06/13/20 2025   *   K 3.2*   CL 87*   CO2 31   BUN 13   CREATININE 0.6*   GLUCOSE 162*     LFT'S:  Recent Labs     06/13/20 2025   AST 19   ALT 11   BILITOT 0.9   ALKPHOS 70     CARDIAC ENZYMES:   Recent Labs     06/13/20 2025   TROPONINI 0.03*     No results found for: PROBNP    LACTIC ACID:  Recent Labs     06/13/20 2025   LACTA 1.5       U/A:  Recent Labs     06/14/20 0104   LEUKOCYTESUR Negative   WBCUA 0-2   COLORU Yellow   RBCUA 3-4   MUCUS 1+*   CLARITYU Clear   SPECGRAV 1.025   BLOODU Negative   GLUCOSEU Negative     VBG:  Blood gas, venous [[id number]] (Abnormal)    Collected: 06/14/20 0102    Updated: 06/14/20 0103    Specimen Source: Blood gases     pH, Rashel 7.442    pCO2, Rashel 40.5 mmHg    pO2, Rashel 82. 9High  mmHg    HCO3, Venous 27.0 mmol/L    Base Excess, Rashel 2.7 mmol/L    O2 Sat, Rashel 97 %    Carboxyhemoglobin 3.3High  %    Comment:      0.0-1.5   (Smokers 1.5-5.0)        MetHgb, Rashel 0.3 %    TC02 (Calc), Rashel 28 mmol/L    O2 Content, Rashel 17 VOL %    O2 Therapy Unknown   Narrative:     Performed at:  [facility]. [facility]  [address] [place] [address]. [address]   Phone [phone]  I certify that Wilman Crawford is expected to be hospitalized for 2 midnights based on the following assessment and plan:    ASSESSMENT/PLAN:  1. Acute on chronic respiratory failure with hypoxia, likely related to pneumonia with probable COPD component, supplemental O2 to maintain SPO2 ? 92%, continuous pulse ox. Patient is normally on 2L O2 at home ON only, recently requiring during day. He is requiring 2-4 L to maintain sats. Wean as tolerated. Pulm c/s.   2. Sepsis, likely related to pneumonia, antibiotics as below. Follow-up cultures. No need for pressors at this time. Lactate normal.  COVID-19 is a consideration with this patient. Follow-up COVID labs and droplet plus precautions. 3. A. fib with RVR, new onset, rate in 120s-130's in the ER. Started on Cardizem drip with bolus, continue. Drip will likely require titration. Cardiology consult. Telemetry, serial tropes. Start full dose Lovenox. Electrolyte abnormalities may be contributing. 4.  LLL PNA with probable aeCOPD component, IV solumedrol, IV ceftriaxone and azithromycin, PRN/SHANIQUE intensive NEB therapy. Check respiratory culture and procalcitonin. He reports hx of dysphagia which has required dilatation by ENT in the past.  Aspiration? SLLP eval.  Hold home regimen for now. 5. Near syncopal episode at home, fall precautions, check orthostatics. Will defer to cardiology/DD for repeat echo. 6. Hypokalemia, 3.2, 10 MEQ IV given at [facility]. Will order an additional 40 meq. Added on Mg level which was 1.6. Replacement protocols. 7. Indeterminate troponin, 0.03, telemetry and serial tropes. Possibly related to rate. 8. Leukocytosis, 12.9, monitor. 9. History of grade 2 dCHF (see echo above from 2018), added on BNP and it is pending. Patient actually appears clinically dry at this time so I doubt this is primary etiology. DVT Prophylaxis: Full dose Lovenox  Diet: N.p.o.  Code Status: Full Code  PT/OT Eval Status: Will order if needed and as patient condition allows  Dispo - Admit to inpatient ICU    Total critical care time caring for this patient with life threatening, unstable organ failure, including direct patient contact, management of life support systems, review of data including imaging and labs, discussions with other team members and physicians is 33 minutes so far today, excluding procedures. Adrienne Blum MD    Thank you SONAL Hough - ROSA for the opportunity to be involved in this patient's care. If you have any questions or concerns please feel free to contact me via the [employer] at [phone]. This chart was generated using the [address] [place] [address] [employer] system. I created this record but it may contain dictation errors given the limitations of this technology.

## 2020-06-14 NOTE — PROGRESS NOTES
exertion;Irregular pattern;or RR less than 6 = 2    Breath Sounds: Absent bilaterally and/or with wheezes = 3    Sputum   ,  , Sputum How Obtained: Cough on request  Cough: Strong, productive = 1    Vital Signs   BP (!) 143/54   Pulse 71   Temp 98.2 °F (36.8 °C) (Oral)   Resp 15   Ht 5' 7\" (1.702 m)   Wt 208 lb (94.3 kg)   SpO2 95%   BMI 32.58 kg/m²   SPO2 (COPD values may differ): 88-89% on room air or greater than 92% on FiO2 28- 35% = 2    Peak Flow (asthma only): not applicable = 0    RSI: 39-50 = Q6H or QID and Q4HPRN for dyspnea        Plan       Goals: medication delivery, mobilize retained secretions, volume expansion and improve oxygenation    Patient/caregiver was educated on the proper method of use for Respiratory Care Devices:  Yes      Level of patient/caregiver understanding able to:   ? Verbalize understanding   ? Demonstrate understanding       ? Teach back        ? Needs reinforcement       ? No available caregiver               ? Other:     Response to education:  Very Good     Is patient being placed on Home Treatment Regimen? No     Does the patient have everything they need prior to discharge? Yes     Comments: pt assessed and chart reviewed    Plan of Care: albuterol atrovent q4hwa     Electronically signed by Alysha Walton RCP on 6/14/2020 at 3:40 PM    Respiratory Protocol Guidelines     1. Assessment and treatment by Respiratory Therapy will be initiated for medication and therapeutic interventions upon initiation of aerosolized medication. 2. Physician will be contacted for respiratory rate (RR) greater than 35 breaths per minute. Therapy will be held for heart rate (HR) greater than 140 beats per minute, pending direction from physician. 3. Bronchodilators will be administered via Metered Dose Inhaler (MDI) with spacer when the following criteria are met:  a.  Alert and cooperative     b. HR < 140 bpm  c. RR < 30 bpm                d. Can demonstrate a 2-3 second inspiratory hold  4. Bronchodilators will be administered via Hand Held Nebulizer HENRIETTA [facility]) to patients when ANY of the following criteria are met  a. Incognizant or uncooperative          b. Patients treated with HHN at Home        c. Unable to demonstrate proper use of MDI with spacer     d. RR > 30 bpm   5. Bronchodilators will be delivered via Metered Dose Inhaler (MDI), HHN, Aerogen to intubated patients on mechanical ventilation. 6. Inhalation medication orders will be delivered and/or substituted as outlined below. Aerosolized Medications Ordering and Administration Guidelines:    1. All Medications will be ordered by a physician, and their frequency and/or modality will be adjusted as defined by the patients Respiratory Severity Index (RSI) score. 2. If the patient does not have documented COPD, consider discontinuing anticholinergics when RSI is less than 9.  3. If the bronchospasm worsens (increased RSI), then the bronchodilator frequency can be increased to a maximum of every 4 hours. If greater than every 4 hours is required, the physician will be contacted. 4. If the bronchospasm improves, the frequency of the bronchodilator can be decreased, based on the patient's RSI, but not less than home treatment regimen frequency. 5. Bronchodilator(s) will be discontinued if patient has a RSI less than 9 and has received no scheduled or as needed treatment for 72  Hrs. Patients Ordered on a Mucolytic Agent:    1. Must always be administered with a bronchodilator. 2. Discontinue if patient experiences worsened bronchospasm, or secretions have lessened to the point that the patient is able to clear them with a cough. Anti-inflammatory and Combination Medications:    1. If the patient lacks prior history of lung disease, is not using inhaled anti-inflammatory medication at home, and lacks wheezing by examination or by history for at least 24 hours, contact physician for possible discontinuation.

## 2020-06-14 NOTE — ED PROVIDER NOTES
[facility]  eMERGENCY dEPARTMENT eNCOUnter      Pt Name: Eliana Lange  MRN: [id number]  [place] 1940  Date of evaluation: 6/13/2020  Provider: Lynsey Landry MD    CHIEF COMPLAINT       Chief Complaint   Patient presents with    Shortness of Breath     Pt in per squad, reports of possible syncopal episode at home, SOB, pale, A-Fib RVR. Pt c/o \"being weak\" x 3 days. Solumedrol 125 and duoneb adm by squad         HISTORY OF PRESENT ILLNESS   (Location/Symptom, Timing/Onset, Context/Setting, Quality, Duration, Modifying Factors, Severity)  Note limiting factors. Eliana Lange is a 70 y.o. male with history of COPD and remote history of intracranial hemorrhage 2 years ago with resultant left sided weakness who presents with 3 days of diffuse fatigue, diffuse weakness, lightheadedness, and near syncope. The patient also reports shortness of breath and palpitations. He reports symptoms are moderate to severe, constant, and worsening. He denies any known aggravating or alleviating factors. He denies any chest pain, hemoptysis, blood in stool. Patient was found to be wheezing and short of breath on arrival of EMS with borderline oxygen saturation. He was given 125 mg of IV Solu-Medrol and a DuoNeb in route by squad. HPI    Nursing Notes were reviewed. REVIEW OFSYSTEMS    (2-9 systems for level 4, 10 or more for level 5)     Review of Systems   Constitutional: Positive for fatigue. Negative for appetite change, fever and unexpected weight change. HENT: Negative for facial swelling, trouble swallowing and voice change. Eyes: Negative for photophobia and visual disturbance. Respiratory: Positive for shortness of breath. Negative for wheezing and stridor. Cardiovascular: Positive for palpitations. Negative for chest pain. Gastrointestinal: Negative for abdominal pain, blood in stool, nausea and vomiting.    Genitourinary: Negative for difficulty urinating and (PRINIVIL;ZESTRIL) 10 MG TABLET    TAKE (1) TABLET DAILY    METOPROLOL TARTRATE (LOPRESSOR) 25 MG TABLET    TAKE 1 TABLET TWICE DAILY    MULTIPLE VITAMINS-MINERALS (THERAPEUTIC MULTIVITAMIN-MINERALS) TABLET    Take 1 tablet by mouth daily    NEBULIZERS (COMPRESSOR/NEBULIZER) MISC    Nebulizer as directed. Diagnosis: Chronic COPD with acute exacerbation    PANTOPRAZOLE (PROTONIX) 40 MG TABLET    TAKE (1) TABLET DAILY    POLYETHYLENE GLYCOL (GLYCOLAX) POWDER    Take 17 g by mouth daily    POTASSIUM CHLORIDE (KLOR-CON M) 20 MEQ EXTENDED RELEASE TABLET    Take 1 tablet by mouth daily (with breakfast)    TAMSULOSIN (FLOMAX) 0.4 MG CAPSULE    TAKE 2 CAPSULES AT BEDTIME    TRAZODONE (DESYREL) 50 MG TABLET    Take 1 tablet by mouth nightly       ALLERGIES     Patient has no known allergies.     FAMILY HISTORY       Family History   Problem Relation Age of Onset    Emphysema Mother     Cancer Brother         lung cancer    Asthma Neg Hx     Heart Failure Neg Hx           SOCIAL HISTORY       Social History     Socioeconomic History    Marital status:      Spouse name: None    Number of children: None    Years of education: None    Highest education level: None   Occupational History    None   Social Needs    Financial resource strain: None    Food insecurity     Worry: None     Inability: None    Transportation needs     Medical: None     Non-medical: None   Tobacco Use    Smoking status: Former Smoker     Packs/day: 1.00     Years: 52.00     Pack years: 52.00     Types: Cigarettes     Last attempt to quit: 2000     Years since quittin.1    Smokeless tobacco: Never Used   Substance and Sexual Activity    Alcohol use: Not Currently     Alcohol/week: 0.0 standard drinks     Comment: occasionally    Drug use: No    Sexual activity: Not Currently     Partners: Female   Lifestyle    Physical activity     Days per week: None     Minutes per session: None    Stress: None   Relationships    Social

## 2020-06-14 NOTE — CONSULTS
therapeutic dose   MRSA prophylaxis: Bactroban            Total critical care time caring for this patient with life threatening, unstable organ failure, including direct patient contact, management of life support systems, review of data including imaging and labs, discussions with other team members and physicians is 34 minutes, excluding procedures.

## 2020-06-14 NOTE — CARE COORDINATION
CM attempted to call into pt's room, but he did not answer. CM attempted to contact wife, Joshua Barron ([phone]), but she did not answer. Will attempt tomorrow on 6/15/2020.

## 2020-06-14 NOTE — PROGRESS NOTES
Patient admitted to room 3013 from [facility] [address]. Patient oriented to room, call light, bed rails, phone, lights and bathroom. Patient instructed about the schedule of the day including: vital sign frequency, lab draws, possible tests, frequency of MD and staff rounds, daily weights, I &O's and prescribed diet. bed alarm in place, patient aware of placement and reason. ICU monitoring in place, patient aware of placement and reason. Bed locked, in lowest position, side rails up 2/4, call light within reach. Recliner Assessment  Patient is not able to demonstrated the ability to move from a reclining position to an upright position within the recliner. however patient is alert, oriented and able to provide informed consent    4 Eyes Skin Assessment     The patient is being assess for   Admission    I agree that 2 RN's have performed a thorough Head to Toe Skin Assessment on the patient. ALL assessment sites listed below have been assessed. Areas assessed by both nurses:   [x]   Head, Face, and Ears   [x]   Shoulders, Back, and Chest, Abdomen  [x]   Arms, Elbows, and Hands   [x]   Coccyx, Sacrum, and Ischium  [x]   Legs, Feet, and Heels        Scattered bruising. **SHARE this note so that the co-signing nurse is able to place an eSignature**    Co-signer eSignature: Electronically signed by Yvonne Miller RN on 6/14/20 at 6:52 AM EDT    Does the Patient have Skin Breakdown?   No          Christopher Prevention initiated:  Yes   Wound Care Orders initiated:  NA      [facility] nurse consulted for Pressure Injury (Stage 3,4, Unstageable, DTI, NWPT, Complex wounds)and New or Established Ostomies:  NA      Primary Nurse eSignature: Electronically signed by Mannie Brown RN on 6/14/20 at 6:51 AM EDT

## 2020-06-15 LAB
A/G RATIO: 1.3 (ref 1.1–2.2)
ALBUMIN SERPL-MCNC: 3.2 G/DL (ref 3.4–5)
ALP BLD-CCNC: 44 U/L (ref 40–129)
ALT SERPL-CCNC: 9 U/L (ref 10–40)
ANION GAP SERPL CALCULATED.3IONS-SCNC: 8 MMOL/L (ref 3–16)
AST SERPL-CCNC: 20 U/L (ref 15–37)
BASOPHILS ABSOLUTE: 0 K/UL (ref 0–0.2)
BASOPHILS RELATIVE PERCENT: 0 %
BILIRUB SERPL-MCNC: 0.3 MG/DL (ref 0–1)
BUN BLDV-MCNC: 14 MG/DL (ref 7–20)
CALCIUM SERPL-MCNC: 8.9 MG/DL (ref 8.3–10.6)
CHLORIDE BLD-SCNC: 89 MMOL/L (ref 99–110)
CO2: 31 MMOL/L (ref 21–32)
CREAT SERPL-MCNC: <0.5 MG/DL (ref 0.8–1.3)
EOSINOPHILS ABSOLUTE: 0 K/UL (ref 0–0.6)
EOSINOPHILS RELATIVE PERCENT: 0 %
GFR AFRICAN AMERICAN: >60
GFR NON-AFRICAN AMERICAN: >60
GLOBULIN: 2.4 G/DL
GLUCOSE BLD-MCNC: 155 MG/DL (ref 70–99)
GLUCOSE BLD-MCNC: 160 MG/DL (ref 70–99)
GLUCOSE BLD-MCNC: 163 MG/DL (ref 70–99)
HCT VFR BLD CALC: 34.5 % (ref 40.5–52.5)
HEMOGLOBIN: 11.6 G/DL (ref 13.5–17.5)
LV EF: 58 %
LVEF MODALITY: NORMAL
LYMPHOCYTES ABSOLUTE: 0.4 K/UL (ref 1–5.1)
LYMPHOCYTES RELATIVE PERCENT: 3.5 %
MAGNESIUM: 2.1 MG/DL (ref 1.8–2.4)
MCH RBC QN AUTO: 31 PG (ref 26–34)
MCHC RBC AUTO-ENTMCNC: 33.5 G/DL (ref 31–36)
MCV RBC AUTO: 92.5 FL (ref 80–100)
MONOCYTES ABSOLUTE: 0.4 K/UL (ref 0–1.3)
MONOCYTES RELATIVE PERCENT: 4 %
NEUTROPHILS ABSOLUTE: 10.2 K/UL (ref 1.7–7.7)
NEUTROPHILS RELATIVE PERCENT: 92.5 %
PDW BLD-RTO: 13.4 % (ref 12.4–15.4)
PERFORMED ON: ABNORMAL
PERFORMED ON: ABNORMAL
PHOSPHORUS: 2.4 MG/DL (ref 2.5–4.9)
PLATELET # BLD: 205 K/UL (ref 135–450)
PMV BLD AUTO: 6.7 FL (ref 5–10.5)
POTASSIUM REFLEX MAGNESIUM: 3.6 MMOL/L (ref 3.5–5.1)
POTASSIUM SERPL-SCNC: 3.6 MMOL/L (ref 3.5–5.1)
RBC # BLD: 3.73 M/UL (ref 4.2–5.9)
REPORT: NORMAL
SARS-COV-2: NOT DETECTED
SODIUM BLD-SCNC: 128 MMOL/L (ref 136–145)
THIS TEST SENT TO: NORMAL
TOTAL PROTEIN: 5.6 G/DL (ref 6.4–8.2)
URINE CULTURE, ROUTINE: NORMAL
WBC # BLD: 11 K/UL (ref 4–11)

## 2020-06-15 PROCEDURE — 6370000000 HC RX 637 (ALT 250 FOR IP): Performed by: INTERNAL MEDICINE

## 2020-06-15 PROCEDURE — 94761 N-INVAS EAR/PLS OXIMETRY MLT: CPT

## 2020-06-15 PROCEDURE — 6360000002 HC RX W HCPCS: Performed by: INTERNAL MEDICINE

## 2020-06-15 PROCEDURE — 99291 CRITICAL CARE FIRST HOUR: CPT | Performed by: INTERNAL MEDICINE

## 2020-06-15 PROCEDURE — 2060000000 HC ICU INTERMEDIATE R&B

## 2020-06-15 PROCEDURE — 2700000000 HC OXYGEN THERAPY PER DAY

## 2020-06-15 PROCEDURE — C9113 INJ PANTOPRAZOLE SODIUM, VIA: HCPCS | Performed by: INTERNAL MEDICINE

## 2020-06-15 PROCEDURE — 2580000003 HC RX 258: Performed by: INTERNAL MEDICINE

## 2020-06-15 PROCEDURE — 2000000000 HC ICU R&B

## 2020-06-15 PROCEDURE — 85025 COMPLETE CBC W/AUTO DIFF WBC: CPT

## 2020-06-15 PROCEDURE — 99232 SBSQ HOSP IP/OBS MODERATE 35: CPT | Performed by: INTERNAL MEDICINE

## 2020-06-15 PROCEDURE — 80053 COMPREHEN METABOLIC PANEL: CPT

## 2020-06-15 PROCEDURE — 93306 TTE W/DOPPLER COMPLETE: CPT

## 2020-06-15 PROCEDURE — 84100 ASSAY OF PHOSPHORUS: CPT

## 2020-06-15 PROCEDURE — 99223 1ST HOSP IP/OBS HIGH 75: CPT | Performed by: INTERNAL MEDICINE

## 2020-06-15 PROCEDURE — 83735 ASSAY OF MAGNESIUM: CPT

## 2020-06-15 PROCEDURE — 94640 AIRWAY INHALATION TREATMENT: CPT

## 2020-06-15 PROCEDURE — 92610 EVALUATE SWALLOWING FUNCTION: CPT

## 2020-06-15 PROCEDURE — 36415 COLL VENOUS BLD VENIPUNCTURE: CPT

## 2020-06-15 PROCEDURE — 2500000003 HC RX 250 WO HCPCS: Performed by: INTERNAL MEDICINE

## 2020-06-15 RX ORDER — ALBUTEROL SULFATE 90 UG/1
2 AEROSOL, METERED RESPIRATORY (INHALATION) 4 TIMES DAILY
Status: DISCONTINUED | OUTPATIENT
Start: 2020-06-15 | End: 2020-06-17

## 2020-06-15 RX ORDER — ALBUTEROL SULFATE 90 UG/1
2 AEROSOL, METERED RESPIRATORY (INHALATION) 2 TIMES DAILY
Status: DISCONTINUED | OUTPATIENT
Start: 2020-06-15 | End: 2020-06-15

## 2020-06-15 RX ORDER — ALBUTEROL SULFATE 90 UG/1
2 AEROSOL, METERED RESPIRATORY (INHALATION) EVERY 6 HOURS PRN
Status: DISCONTINUED | OUTPATIENT
Start: 2020-06-15 | End: 2020-06-19 | Stop reason: HOSPADM

## 2020-06-15 RX ADMIN — Medication 2 PUFF: at 19:26

## 2020-06-15 RX ADMIN — METOPROLOL TARTRATE 25 MG: 25 TABLET, FILM COATED ORAL at 08:22

## 2020-06-15 RX ADMIN — ACETAMINOPHEN 650 MG: 325 TABLET ORAL at 02:25

## 2020-06-15 RX ADMIN — Medication 2 PUFF: at 07:52

## 2020-06-15 RX ADMIN — DILTIAZEM HYDROCHLORIDE 5 MG/HR: 5 INJECTION INTRAVENOUS at 05:09

## 2020-06-15 RX ADMIN — ATORVASTATIN CALCIUM 20 MG: 10 TABLET, FILM COATED ORAL at 21:15

## 2020-06-15 RX ADMIN — METHYLPREDNISOLONE SODIUM SUCCINATE 40 MG: 40 INJECTION, POWDER, FOR SOLUTION INTRAMUSCULAR; INTRAVENOUS at 17:33

## 2020-06-15 RX ADMIN — MULTIPLE VITAMINS W/ MINERALS TAB 1 TABLET: TAB at 08:20

## 2020-06-15 RX ADMIN — MUPIROCIN: 20 OINTMENT TOPICAL at 08:24

## 2020-06-15 RX ADMIN — POTASSIUM CHLORIDE 20 MEQ: 750 TABLET, EXTENDED RELEASE ORAL at 08:20

## 2020-06-15 RX ADMIN — Medication 10 ML: at 21:21

## 2020-06-15 RX ADMIN — ACETAMINOPHEN 650 MG: 325 TABLET ORAL at 21:20

## 2020-06-15 RX ADMIN — FINASTERIDE 5 MG: 5 TABLET, FILM COATED ORAL at 08:21

## 2020-06-15 RX ADMIN — METOPROLOL TARTRATE 25 MG: 25 TABLET, FILM COATED ORAL at 21:15

## 2020-06-15 RX ADMIN — AZITHROMYCIN DIHYDRATE 500 MG: 500 INJECTION, POWDER, LYOPHILIZED, FOR SOLUTION INTRAVENOUS at 22:10

## 2020-06-15 RX ADMIN — Medication 2 PUFF: at 16:28

## 2020-06-15 RX ADMIN — SODIUM CHLORIDE: 9 INJECTION, SOLUTION INTRAVENOUS at 12:37

## 2020-06-15 RX ADMIN — ASPIRIN 81 MG 81 MG: 81 TABLET ORAL at 08:20

## 2020-06-15 RX ADMIN — ENOXAPARIN SODIUM 90 MG: 100 INJECTION SUBCUTANEOUS at 21:15

## 2020-06-15 RX ADMIN — MUPIROCIN: 20 OINTMENT TOPICAL at 21:22

## 2020-06-15 RX ADMIN — CEFTRIAXONE SODIUM 1 G: 1 INJECTION, POWDER, FOR SOLUTION INTRAMUSCULAR; INTRAVENOUS at 21:18

## 2020-06-15 RX ADMIN — PANTOPRAZOLE SODIUM 40 MG: 40 INJECTION, POWDER, FOR SOLUTION INTRAVENOUS at 08:20

## 2020-06-15 RX ADMIN — TAMSULOSIN HYDROCHLORIDE 0.8 MG: 0.4 CAPSULE ORAL at 08:20

## 2020-06-15 RX ADMIN — METHYLPREDNISOLONE SODIUM SUCCINATE 40 MG: 40 INJECTION, POWDER, FOR SOLUTION INTRAMUSCULAR; INTRAVENOUS at 06:10

## 2020-06-15 RX ADMIN — ESCITALOPRAM OXALATE 10 MG: 10 TABLET ORAL at 08:20

## 2020-06-15 RX ADMIN — ENOXAPARIN SODIUM 90 MG: 100 INJECTION SUBCUTANEOUS at 11:17

## 2020-06-15 RX ADMIN — Medication 10 ML: at 06:11

## 2020-06-15 ASSESSMENT — PAIN DESCRIPTION - ORIENTATION: ORIENTATION: RIGHT;LEFT

## 2020-06-15 ASSESSMENT — PULMONARY FUNCTION TESTS: PEFR_L/MIN: 24

## 2020-06-15 ASSESSMENT — PAIN SCALES - GENERAL
PAINLEVEL_OUTOF10: 5
PAINLEVEL_OUTOF10: 0
PAINLEVEL_OUTOF10: 3

## 2020-06-15 ASSESSMENT — PAIN DESCRIPTION - LOCATION: LOCATION: BACK;LEG

## 2020-06-15 ASSESSMENT — PAIN DESCRIPTION - PAIN TYPE: TYPE: ACUTE PAIN

## 2020-06-15 NOTE — PROGRESS NOTES
S:  Alteration In Comfort  B:  Patient request Acetaminophen for c/o leg discomfort rated 3 out 10. Using a 0 -10 pain Scale. A:  Administered Acetaminophen 650mg PO per patient's request for leg discomfort. Attempted to reposition patient. R:  Patient refused repositioning at this time. 03:31  E:   Pt is somnolent at this time. Observed no apparent s/s of pain or discomfort.

## 2020-06-15 NOTE — PROGRESS NOTES
Pulmonary & Critical Care Medicine ICU Progress Note  CC:acute resp failure    Events of Last 24 hours: patient with bradycardia overnight- cardizem stopped. He received metoprolol. Invasive Lines:   IV Line: PIvs    MV: none*   / / /   No results for input(s): PHART, SIS3JEF, PO2ART in the last 72 hours. IV:   sodium chloride 75 mL/hr (06/14/20 2202)    dilTIAZem (CARDIZEM) 125 mg in dextrose 5% 125 mL infusion 5 mg/hr (06/15/20 0509)       EXAM:  BP (!) 130/59   Pulse 50   Temp 97.5 °F (36.4 °C) (Oral)   Resp 15   Ht 5' 7\" (1.702 m)   Wt 208 lb (94.3 kg)   SpO2 99%   BMI 32.58 kg/m²  on 3l NC  Tmax: 98.3  CVP:      Intake/Output Summary (Last 24 hours) at 6/15/2020 0701  Last data filed at 6/15/2020 0615  Gross per 24 hour   Intake 2938. 11 ml   Output 1725 ml   Net 1213.11 ml     General: No distress. Alert. Eyes: PERRL. No sclera icterus. No conjunctival injection. ENT: No discharge. Pharynx clear. Neck: Trachea midline. Normal thyroid. Resp: No accessory muscle use. No crackles. mild wheezing. No rhonchi. No dullness on percussion. CV: colt rate. Regular rhythm. No mumur or rub. trace edema. GI: Non-tender. Non-distended. No anton  Skin: Warm and dry. No nodule on exposed extremities. No rash on exposed extremities. Lymph: No cervical LAD. No supraclavicular LAD. M/S: No cyanosis. No joint deformity. No clubbing. Neuro: Awake. Moves all extremities, conversant.      Medications:   aspirin  81 mg Oral Daily    atorvastatin  20 mg Oral Nightly    escitalopram  10 mg Oral Daily    finasteride  5 mg Oral Daily    cefTRIAXone (ROCEPHIN) IV  1 g Intravenous Q24H    azithromycin  500 mg Intravenous Q24H    sodium chloride flush  10 mL Intravenous 2 times per day    enoxaparin  1 mg/kg Subcutaneous BID    budesonide-formoterol  2 puff Inhalation BID    lactulose  10 g Oral QPM    metoprolol tartrate  25 mg Oral BID    therapeutic multivitamin-minerals  1 tablet Oral Daily   

## 2020-06-15 NOTE — PLAN OF CARE
Problem: Falls - Risk of:  Goal: Will remain free from falls  Description: Will remain free from falls  Note: No attempts to get out of bed without assistance. Pt reminded to call for assist before ambulating. Nonskid socks on. Bed locked and in lowest position. Side rails up x3. Exit alarm in use. Call light in reach. Remains free from injury. Will cont to monitor safety. Problem: Gas Exchange - Impaired:  Goal: Levels of oxygenation will improve  Description: Levels of oxygenation will improve  Note: Pt on 4L NC, sp02 WNL. Nonproductive cough. Sob on exertion. Will cont to monitor respiratory status.

## 2020-06-15 NOTE — CARE COORDINATION
Case Management Assessment  Initial Evaluation    Date/Time of Evaluation: 6/15/2020 10:34 AM  Assessment Completed by: Patricia Higuera    Patient Name: Varsha Stanford  YOB: 1940  Diagnosis: Acute respiratory failure with hypoxia ([address] [J96.01]  Date / Time: 6/13/2020  8:22 PM  Admission status/Date: 06/14/20  Chart Reviewed: Yes      Patient Interviewed: No -Covid Isolation  Family Interviewed:  Yes - Spouse      Hospitalization in the last 30 days:  No    Contacts  :     Relationship to Patient:   Phone Number:    Alternate Contact:     Relationship to Patient:     Phone Number:    Met with:    Current PCP Walter 46 required for SNF : Y, N        3 night stay required - Y, N    ADLS  Support Systems:    Transportation: family    Meal Preparation: spouse    Housing  Home Environment: apt  Steps: 3  Plans to Return to Present Housing: Yes  Other Identified Issues: Sterling Abeben  Currently active with [address] : Yes - [facility]SN/Aide     Passport/Waiver : No  :                      Phone Number:    Passport/Waiver Services: Corinne Diana Provider: Renae  Equipment: n/aWalker_x__Cane___RTS__x_ BSC___Shower Chair__bench x_  02_x_ at _2___Liter(s)---Uses_Con't_______HHN___ CPAP___ BiPap___ Hospital Bed___W/C____Other________      Has Home O2 in place on admit:  Yes    If above answer is No ---is pt presently on O2 during hospitalization: n/a if yes CM to follow for potential DC O2 need  Informed of need to bring portable home O2 tank on day of discharge for nursing to connect prior to leaving:   Yes  Verbalized agreement/Understanding:   Yes    Community Service Affiliation  Dialysis:  No    · Name:  · Location  · Dialysis Schedule:  · Phone:   · Fax:     Outpatient PT/OT: No    Cancer Center: No     CHF Clinic: No     Pulmonary Rehab: No  Pain Clinic: No  Community Mental Health: No    Wound Clinic: No     COVID SCREENING: Yes - Pending       Other: n/a    The Plan for Transition of Care is related to the following treatment goals: Return home with spouse/resume services w/[facility]      DISCHARGE PLAN: Chart reviewed. Pt in Covid 19 Isolation. Called spouse. Pt lives with spouse and plans to return. Has Renae  O2/HHN. Active with [facility]. Uses walker at all times. Pt/ot eval. Cm to follow. Explained Case Management role/services.

## 2020-06-15 NOTE — PROGRESS NOTES
Xander Mike is a 70 y.o. male patient. No Known Allergies  Active Problems:    Acute respiratory failure with hypoxia (HCC)    Atrial fibrillation with RVR (HCC)    COPD exacerbation (HCC)    Abnormal chest x-ray    Disorder of electrolytes    Elevated troponin  Resolved Problems:    * No resolved hospital problems. *    Blood pressure (!) 132/55, pulse 68, temperature 97.7 °F (36.5 °C), temperature source Oral, resp. rate 18, height 5' 7\" (1.702 m), weight 208 lb (94.3 kg), SpO2 94 %. Subjective:  Symptoms:  Stable. He reports cough. (Cough is frequent, thick, clear. ). Diet:  NPO. Activity level: Impaired due to weakness (Left lower extremity weakness secondary to previous CVA). Pain:  He reports no pain. Objective:  General Appearance:  Comfortable, in no acute distress and not in pain. Vital signs: (most recent): Blood pressure (!) 132/55, pulse 68, temperature 97.7 °F (36.5 °C), temperature source Oral, resp. rate 18, height 5' 7\" (1.702 m), weight 208 lb (94.3 kg), SpO2 94 %. Vital signs are normal.    Output: Producing urine (Observed clear, yellow urine approximately 250 in drain bag. Cudae catheter was placed by urologist on the previous shift.) and no stool output. HEENT: Normal HEENT exam.    Lungs:  Normal effort. Breath sounds clear to auscultation. He is not in respiratory distress. No stridor. There are decreased breath sounds. No rales, wheezes or rhonchi. Heart: Bradycardia. Regular rhythm. S1 normal and S2 normal.  (History of AFIB. Cardizem gtt is infusing at this time at a rate of 5mcg/hr.)  Chest: Symmetric chest wall expansion. No chest wall tenderness. Abdomen: Abdomen is soft and non-distended. There are no signs of ascites. Tinkling bowel sounds. (No BM x3 days per patient. Patient is NPO at this time. ). There is no abdominal tenderness.   There is no right upper quadrant, right lower quadrant, left upper quadrant or left lower quadrant

## 2020-06-15 NOTE — PROGRESS NOTES
RESPIRATORY THERAPY ASSESSMENT    Name:  Anna Luna Record Number:  [id number]  Age: 70 y.o. Gender: male  : 1940  Today's Date:  6/15/2020  Room:  3013/3013-01    Assessment     Is the patient being admitted for a COPD or Asthma exacerbation? No   (If yes the patient will be seen every 4 hours for the first 24 hours and then reassessed)    Patient Admission Diagnosis      Allergies  No Known Allergies    Minimum Predicted Vital Capacity:               Actual Vital Capacity:                    Pulmonary History:COPD  Home Oxygen Therapy:  2 liters/min via nasal cannula  Home Respiratory Therapy:Albuterol and Mometasone/Formoterol   Current Respiratory Therapy:  Albuterol q4w/a, atrovent q4w/a   Treatment Type: MDI  Medications: Ipratropium Bromide    Respiratory Severity Index(RSI)   Patients with orders for inhalation medications, oxygen, or any therapeutic treatment modality will be placed on Respiratory Protocol. They will be assessed with the first treatment and at least every 72 hours thereafter. The following severity scale will be used to determine frequency of treatment intervention.     Smoking History: Pulmonary Disease or Smoking History, Greater than 15 pack year = 2    Social History  Social History     Tobacco Use    Smoking status: Former Smoker     Packs/day: 1.00     Years: 52.00     Pack years: 52.00     Types: Cigarettes     Last attempt to quit: 2000     Years since quittin.1    Smokeless tobacco: Never Used   Substance Use Topics    Alcohol use: Not Currently     Alcohol/week: 0.0 standard drinks     Comment: occasionally    Drug use: No       Recent Surgical History: None = 0  Past Surgical History  Past Surgical History:   Procedure Laterality Date    ANKLE FRACTURE SURGERY      COLONOSCOPY  2013    Diverticulosis    LUNG REMOVAL, PARTIAL  May 2009    resection of lung cancer       Level of Consciousness: Alert, Oriented, and Cooperative = 0    Level of Activity: Mostly sedentary, minimal walking = 2    Respiratory Pattern: Regular Pattern; RR 8-20 = 0    Breath Sounds: Diminished unilaterally = 1    Sputum  Sputum Color: Clear, Tenacity: Thin, Sputum How Obtained: Spontaneous cough  Cough: Strong, spontaneous, non-productive = 0    Vital Signs   BP (!) 130/57   Pulse 54   Temp 97.7 °F (36.5 °C) (Oral)   Resp 15   Ht 5' 7\" (1.702 m)   Wt 208 lb (94.3 kg)   SpO2 97%   BMI 32.58 kg/m²   SPO2 (COPD values may differ): 90-91% on room air or greater than 92% on FiO2 24- 28% = 1    Peak Flow (asthma only): not applicable = 0    RSI: 5-6 = Q4hr PRN (every four hours as needed) for dyspnea        Plan       Goals: medication delivery, mobilize retained secretions, volume expansion and improve oxygenation    Patient/caregiver was educated on the proper method of use for Respiratory Care Devices:  Yes      Level of patient/caregiver understanding able to:   ? Verbalize understanding   ? Demonstrate understanding       ? Teach back        ? Needs reinforcement       ? No available caregiver               ? Other:     Response to education:  Good     Is patient being placed on Home Treatment Regimen? No     Does the patient have everything they need prior to discharge? Yes     Comments: chart reviewed, pt assessed and interviewed    Plan of Care: albuterol/atrovent mdi qid and Q4prn and symbicort bid    Electronically signed by Lisbeth Quiles RCP on 6/15/2020 at 10:26 AM    Respiratory Protocol Guidelines     1. Assessment and treatment by Respiratory Therapy will be initiated for medication and therapeutic interventions upon initiation of aerosolized medication. 2. Physician will be contacted for respiratory rate (RR) greater than 35 breaths per minute. Therapy will be held for heart rate (HR) greater than 140 beats per minute, pending direction from physician.   3. Bronchodilators will be administered via Metered Dose Inhaler (MDI) with spacer when the

## 2020-06-15 NOTE — CONSULTS
[facility] [facility]  [phone]        Reason for Consultation/Chief Complaint: \"I have been having weakness, SOB, almost passed out\" per chart    History from chart and nursing due to currently in COVID rule out    History of Present Illness:  Brodie Luke is a 70 y.o. patient who presented to [facility] 6/14/20 with c/o SOB and weakness. No known cardiovascular disease history. He has PMH HTN, HLD, lung cancer, COPD and remote history of intracranial hemorrhage 2 years ago with resultant left-sided weakness. Most recent ECHO 3/9/18 EF 60-65%; mild LVH, grade 2 DD   Now presented with 3 day history of SOB, weakness, dizziness. Normally on 2L O2 PRN for COPD but using for last 2 days. In the ED his EKG revealed Atrial fibrillation with RV 119bpm; ST change inferolateral consider ischemia (3/19 EKG NSR). Note CXR with new left basilar lung infiltrate with associated pleural effusion; Mild pulmonary vascular congestion; troponins 0.03 and <0.01 x 3; Na+ 131, 128, K+ 3.2, 3.6. Urology doc placed shook catheter due to BPH and acute urinary retention. Note diltiazem gtt started but converted to NSR and due to low HR's stopped 6/15 morning. I have been asked to provide consultation regarding further management and testing. Past Medical History:   has a past medical history of BPH, Bronchitis chronic, Cerebral artery occlusion with cerebral infarction ([address] Emphysema of lung ([address] Hyperlipidemia, Hypertension, Lung cancer ([address] Pneumonia, and T2 vertebral fracture ([address] Surgical History:   has a past surgical history that includes Lung removal, partial (May 2009); Ankle fracture surgery; and Colonoscopy (05/07/2013). Social History:   reports that he quit smoking about 20 years ago. His smoking use included cigarettes. He has a 52.00 pack-year smoking history. He has never used smokeless tobacco. He reports previous alcohol use. He reports that he does not use drugs.      Family Diastolic filling parameters suggest grade II diastolic dysfunction. The aortic valve appears sclerotic but opens well. Trivial mitral and tricuspid regurgitation is present. The left atrium is   dilated. No prior echo for comparison. Assessment:  Nuria Yun is a 70 y.o. patient who presented to [facility] 6/14/20 with c/o SOB and weakness. No known cardiovascular disease history. He has PMH HTN, HLD, lung cancer, COPD and remote history of intracranial hemorrhage 2 years ago with resultant left-sided weakness. Most recent ECHO 3/9/18 EF 60-65%; mild LVH, grade 2 diastolic dysfunction. Now presented with 3 day history of SOB, weakness, dizziness. Normally on 2L O2 PRN for COPD but using for last 2 days. In the ED his EKG revealed Atrial fibrillation with RV 119bpm; ST change inferolateral consider ischemia (3/19 EKG NSR). Note CXR with new left basilar lung infiltrate with associated pleural effusion; Mild pulmonary vascular congestion; troponins 0.03 and <0.01 x 3; TSH=0.37; Na+ 131, 128, K+ 3.2, 3.6. Urology doc placed shook catheter due to BPH and acute urinary retention. Note diltiazem gtt started but converted to NSR and due to low HR's stopped 6/15 morning. Diagnosis of new-onset atrial fibrillation in elderly male with hx COPD and possible PNA being r/o'd for Covid. Recs:  1. Agree with D/C of diltiazem gtt. 2. Follow telemetry. 3. Continue lovenox 90mg SQ BID for [facility] for now. 4. ECHO pending.   5. Covid 19 pending     Patient Active Problem List   Diagnosis    BPH (benign prostatic hyperplasia)    Hyperlipidemia with target LDL less than 130    Hypertension    COPD (chronic obstructive pulmonary disease) (HCC)    Colon polyps    Paroxysmal atrial fibrillation (HCC)    Vertebral compression fracture (HCC)    Dysphagia    History of CVA (cerebrovascular accident)    History of lung cancer    Reactive depression    Acute respiratory failure with hypoxia (HCC)    Atrial fibrillation

## 2020-06-15 NOTE — PROGRESS NOTES
I   sodium chloride 75 mL/hr (06/14/20 2202)    dilTIAZem (CARDIZEM) 125 mg in dextrose 5% 125 mL infusion 5 mg/hr (06/15/20 5939)     magnesium sulfate, potassium chloride **OR** potassium alternative oral replacement **OR** potassium chloride, sodium chloride flush, acetaminophen **OR** acetaminophen, perflutren lipid microspheres    Lab Data:  Recent Labs     06/13/20  2025 06/15/20  0438   WBC 12.9* 11.0   HGB 13.8 11.6*   HCT 41.3 34.5*   MCV 92.0 92.5    205     Recent Labs     06/13/20  2025 06/15/20  0438   * 128*   K 3.2* 3.6  3.6   CL 87* 89*   CO2 31 31   PHOS  --  2.4*   BUN 13 14   CREATININE 0.6* <0.5*     Recent Labs     06/14/20  1519 06/14/20  2107   TROPONINI <0.01 <0.01       Coagulation:   Lab Results   Component Value Date    INR 1.23 03/09/2018     Cardiac markers:   Lab Results   Component Value Date    TROPONINI <0.01 06/14/2020         Lab Results   Component Value Date    ALT 9 (L) 06/15/2020    AST 20 06/15/2020    ALKPHOS 44 06/15/2020    BILITOT 0.3 06/15/2020       Lab Results   Component Value Date    INR 1.23 (H) 03/09/2018    PROTIME 14.2 (H) 03/09/2018       Radiology    Chest xray -   Impression:        1. New left basilar lung infiltrate which may be related to atelectasis with  associated pleural effusion.  Pneumonia cannot be excluded. 2. Mild pulmonary vascular congestion, increased. Ct head  Neg       Cultures:   Cultures  Blood- NGTD  Urine  pending   covid neg         Assessment & Plan:      1. Acute on chronic respiratory failure with hypoxia, likely related to pneumonia with COPD component, supplemental O2 to maintain SPO2 ? 92%, continuous pulse ox. Patient is normally on 2L O2 at home ON only, recently requiring during day. He is requiring 2-4 L to maintain sats. Wean as tolerated. Pulm c/s. 2. Possible pneumonia on left lung - CAP - started on rocephin and azithromycin. cx obtained. covid ruled out. pulm consulted        No sepsis.

## 2020-06-15 NOTE — PROGRESS NOTES
4 Eyes Skin Assessment     The patient is being assess for   Shift Handoff  Given to :  Lele Kilgore RN    I agree that 2 RN's have performed a thorough Head to Toe Skin Assessment on the patient. ALL assessment sites listed below have been assessed. Areas assessed by both nurses:   [x]   Head, Face, and Ears   [x]   Shoulders, Back, and Chest, Abdomen  [x]   Arms, Elbows, and Hands   [x]   Coccyx, Sacrum, and Ischium  [x]   Legs, Feet, and Heels        ****Share this note so that the co-signing nurse is able to place an eSignature**    Co-signer eSignature: {Esignature:[id number]}    Does the Patient have Skin Breakdown?   No          Christopher Prevention initiated:  Yes   Wound Care Orders initiated:  No      WOC nurse consulted for Pressure Injury (Stage 3,4, Unstageable, DTI, NWPT, Complex wounds)and New or Established Ostomies:  NA      Primary Nurse eSignature: Electronically signed by Kristie Martell RN on 6/15/20 at 3:19 AM EDT

## 2020-06-15 NOTE — PLAN OF CARE
Problem: Falls - Risk of:  Goal: Will remain free from falls  Description: Will remain free from falls  Outcome: Ongoing     Problem: Falls - Risk of:  Goal: Absence of physical injury  Description: Absence of physical injury  Outcome: Ongoing     Problem: Infection:  Goal: Will remain free from infection  Description: Will remain free from infection  Outcome: Ongoing     Problem: Safety:  Goal: Free from accidental physical injury  Description: Free from accidental physical injury  Outcome: Ongoing     Problem: Safety:  Goal: Free from intentional harm  Description: Free from intentional harm  Outcome: Ongoing

## 2020-06-16 LAB
ANION GAP SERPL CALCULATED.3IONS-SCNC: 8 MMOL/L (ref 3–16)
BASOPHILS ABSOLUTE: 0 K/UL (ref 0–0.2)
BASOPHILS RELATIVE PERCENT: 0 %
BUN BLDV-MCNC: 16 MG/DL (ref 7–20)
CALCIUM SERPL-MCNC: 8.6 MG/DL (ref 8.3–10.6)
CHLORIDE BLD-SCNC: 92 MMOL/L (ref 99–110)
CO2: 28 MMOL/L (ref 21–32)
CREAT SERPL-MCNC: <0.5 MG/DL (ref 0.8–1.3)
EOSINOPHILS ABSOLUTE: 0 K/UL (ref 0–0.6)
EOSINOPHILS RELATIVE PERCENT: 0 %
GFR AFRICAN AMERICAN: >60
GFR NON-AFRICAN AMERICAN: >60
GLUCOSE BLD-MCNC: 148 MG/DL (ref 70–99)
GLUCOSE BLD-MCNC: 178 MG/DL (ref 70–99)
HCT VFR BLD CALC: 33.5 % (ref 40.5–52.5)
HEMOGLOBIN: 11.3 G/DL (ref 13.5–17.5)
LYMPHOCYTES ABSOLUTE: 0.5 K/UL (ref 1–5.1)
LYMPHOCYTES RELATIVE PERCENT: 5 %
MAGNESIUM: 2.2 MG/DL (ref 1.8–2.4)
MCH RBC QN AUTO: 31.3 PG (ref 26–34)
MCHC RBC AUTO-ENTMCNC: 33.6 G/DL (ref 31–36)
MCV RBC AUTO: 93.1 FL (ref 80–100)
MONOCYTES ABSOLUTE: 0.4 K/UL (ref 0–1.3)
MONOCYTES RELATIVE PERCENT: 4 %
NEUTROPHILS ABSOLUTE: 8.8 K/UL (ref 1.7–7.7)
NEUTROPHILS RELATIVE PERCENT: 91 %
PDW BLD-RTO: 13 % (ref 12.4–15.4)
PERFORMED ON: ABNORMAL
PHOSPHORUS: 2.2 MG/DL (ref 2.5–4.9)
PLATELET # BLD: 208 K/UL (ref 135–450)
PMV BLD AUTO: 7 FL (ref 5–10.5)
POTASSIUM SERPL-SCNC: 3.9 MMOL/L (ref 3.5–5.1)
RBC # BLD: 3.6 M/UL (ref 4.2–5.9)
SODIUM BLD-SCNC: 128 MMOL/L (ref 136–145)
WBC # BLD: 9.7 K/UL (ref 4–11)

## 2020-06-16 PROCEDURE — 80048 BASIC METABOLIC PNL TOTAL CA: CPT

## 2020-06-16 PROCEDURE — 83735 ASSAY OF MAGNESIUM: CPT

## 2020-06-16 PROCEDURE — 2700000000 HC OXYGEN THERAPY PER DAY

## 2020-06-16 PROCEDURE — 2580000003 HC RX 258: Performed by: INTERNAL MEDICINE

## 2020-06-16 PROCEDURE — 6370000000 HC RX 637 (ALT 250 FOR IP): Performed by: INTERNAL MEDICINE

## 2020-06-16 PROCEDURE — 6360000002 HC RX W HCPCS: Performed by: INTERNAL MEDICINE

## 2020-06-16 PROCEDURE — 2060000000 HC ICU INTERMEDIATE R&B

## 2020-06-16 PROCEDURE — 85025 COMPLETE CBC W/AUTO DIFF WBC: CPT

## 2020-06-16 PROCEDURE — 94640 AIRWAY INHALATION TREATMENT: CPT

## 2020-06-16 PROCEDURE — 99232 SBSQ HOSP IP/OBS MODERATE 35: CPT | Performed by: INTERNAL MEDICINE

## 2020-06-16 PROCEDURE — C9113 INJ PANTOPRAZOLE SODIUM, VIA: HCPCS | Performed by: INTERNAL MEDICINE

## 2020-06-16 PROCEDURE — 92526 ORAL FUNCTION THERAPY: CPT

## 2020-06-16 PROCEDURE — 99233 SBSQ HOSP IP/OBS HIGH 50: CPT | Performed by: INTERNAL MEDICINE

## 2020-06-16 PROCEDURE — 36415 COLL VENOUS BLD VENIPUNCTURE: CPT

## 2020-06-16 PROCEDURE — 84100 ASSAY OF PHOSPHORUS: CPT

## 2020-06-16 PROCEDURE — 94761 N-INVAS EAR/PLS OXIMETRY MLT: CPT

## 2020-06-16 RX ORDER — AZITHROMYCIN 250 MG/1
500 TABLET, FILM COATED ORAL DAILY
Status: COMPLETED | OUTPATIENT
Start: 2020-06-16 | End: 2020-06-18

## 2020-06-16 RX ADMIN — FINASTERIDE 5 MG: 5 TABLET, FILM COATED ORAL at 08:29

## 2020-06-16 RX ADMIN — POTASSIUM CHLORIDE 20 MEQ: 750 TABLET, EXTENDED RELEASE ORAL at 08:29

## 2020-06-16 RX ADMIN — METOPROLOL TARTRATE 25 MG: 25 TABLET, FILM COATED ORAL at 08:29

## 2020-06-16 RX ADMIN — TAMSULOSIN HYDROCHLORIDE 0.8 MG: 0.4 CAPSULE ORAL at 08:29

## 2020-06-16 RX ADMIN — MULTIPLE VITAMINS W/ MINERALS TAB 1 TABLET: TAB at 08:29

## 2020-06-16 RX ADMIN — Medication 2 PUFF: at 19:50

## 2020-06-16 RX ADMIN — Medication 2 PUFF: at 11:29

## 2020-06-16 RX ADMIN — PANTOPRAZOLE SODIUM 40 MG: 40 INJECTION, POWDER, FOR SOLUTION INTRAVENOUS at 08:30

## 2020-06-16 RX ADMIN — Medication 2 PUFF: at 07:50

## 2020-06-16 RX ADMIN — MUPIROCIN: 20 OINTMENT TOPICAL at 09:04

## 2020-06-16 RX ADMIN — ATORVASTATIN CALCIUM 20 MG: 10 TABLET, FILM COATED ORAL at 21:45

## 2020-06-16 RX ADMIN — AZITHROMYCIN MONOHYDRATE 500 MG: 250 TABLET ORAL at 12:42

## 2020-06-16 RX ADMIN — Medication 2 PUFF: at 07:49

## 2020-06-16 RX ADMIN — ACETAMINOPHEN 650 MG: 325 TABLET ORAL at 21:44

## 2020-06-16 RX ADMIN — Medication 10 ML: at 21:44

## 2020-06-16 RX ADMIN — METHYLPREDNISOLONE SODIUM SUCCINATE 40 MG: 40 INJECTION, POWDER, FOR SOLUTION INTRAMUSCULAR; INTRAVENOUS at 19:23

## 2020-06-16 RX ADMIN — Medication 2 PUFF: at 15:51

## 2020-06-16 RX ADMIN — SODIUM CHLORIDE: 9 INJECTION, SOLUTION INTRAVENOUS at 04:23

## 2020-06-16 RX ADMIN — SODIUM CHLORIDE: 9 INJECTION, SOLUTION INTRAVENOUS at 17:20

## 2020-06-16 RX ADMIN — Medication 2 PUFF: at 15:52

## 2020-06-16 RX ADMIN — Medication 10 ML: at 09:04

## 2020-06-16 RX ADMIN — METOPROLOL TARTRATE 25 MG: 25 TABLET, FILM COATED ORAL at 21:45

## 2020-06-16 RX ADMIN — CEFTRIAXONE SODIUM 1 G: 1 INJECTION, POWDER, FOR SOLUTION INTRAMUSCULAR; INTRAVENOUS at 21:44

## 2020-06-16 RX ADMIN — METHYLPREDNISOLONE SODIUM SUCCINATE 40 MG: 40 INJECTION, POWDER, FOR SOLUTION INTRAMUSCULAR; INTRAVENOUS at 06:17

## 2020-06-16 RX ADMIN — ENOXAPARIN SODIUM 90 MG: 100 INJECTION SUBCUTANEOUS at 08:29

## 2020-06-16 RX ADMIN — ASPIRIN 81 MG 81 MG: 81 TABLET ORAL at 08:29

## 2020-06-16 RX ADMIN — ESCITALOPRAM OXALATE 10 MG: 10 TABLET ORAL at 08:29

## 2020-06-16 ASSESSMENT — PAIN SCALES - GENERAL: PAINLEVEL_OUTOF10: 6

## 2020-06-16 NOTE — PROGRESS NOTES
Speech Language Pathology  Facility/Department: [facility]  Dysphagia Daily Treatment Note    NAME: Eliana Lange  : 1940  MRN: [id number]    Patient Diagnosis(es):   Patient Active Problem List    Diagnosis Date Noted    Paroxysmal atrial fibrillation [facility])      Priority: High    Shortness of breath     Acute respiratory failure with hypoxia ([facility] 2020    Atrial fibrillation with RVR (HCC)     COPD exacerbation (HCC)     Abnormal chest x-ray     Disorder of electrolytes     Elevated troponin     Reactive depression 2019    History of CVA (cerebrovascular accident) 2019    History of lung cancer 2019    Dysphagia 2019    Vertebral compression fracture ([facility] 2019    Colon polyps 04/15/2013    COPD (chronic obstructive pulmonary disease) ([facility] 2010    BPH (benign prostatic hyperplasia) 2010    Hyperlipidemia with target LDL less than 130 2010    Hypertension 2010     Onset Date: 20  Current Diet Level:  Soft and Bite Sized with Thin liquids    Pain:  Pain Assessment  Pain Assessment: 0-10  Pain Level: 5  Patient's Stated Pain Goal: No pain  Pain Type: Acute pain  Pain Location: Back, Leg  Pain Orientation: Right, Left  Response to Pain Intervention: Patient Satisfied  RASS Score: Light Sedation - Patient awakens with eye opening and eye contact, but not sustained    Diet Tolerance:  Patient tolerating current diet level per report    P.O. Trials: Thin   x X 4 via straw  X 4 via cup   Nectar   x X 3 via straw  X 6 via cup   Honey       Puree       Solid   x 1/4th package of terell crackers     Impressions: The pt was seen this AM for dysphagia treatment while elevated to a near 90 degree position in bed. The pt reported that his globus sensation experienced yesterday is better overall. He reported a sensation of possible drainage with some mucous in his throat.  The pt had no watery eyes, throat clearing or coughing intake;Eat/Feed slowly(Head turn right if globus sensation occurs)    Plan:  Continued daily Dysphagia treatment with goals per current plan of care. If pt is discharged prior to next follow-up, this treatment note will serve as discharge summary. Treatment Time: 19 minutes (8973-2600);  Dysphagia treatment    Jung Osborne [facility]  Speech-Language Pathologist  Phone: [phone]  Fax: [phone]

## 2020-06-16 NOTE — PROGRESS NOTES
[facility]   Progress Note  Cardiology      HPI: Seeing Mr. Bebeto Rome today for f/u afib. He denies any specific complaints today. Reports feeling SOB prior to admission. Tele shows NSR 71bpm with occasional PAC's. Physical Examination:    Vitals:    20 0700   BP: (!) 165/70   Pulse: 64   Resp: 19   Temp:    SpO2: 95%          Constitutional and General Appearance: NAD   Respiratory:  · Normal excursion and expansion without use of accessory muscles  · Resp Auscultation: Diffusely soft breath sounds  Cardiovascular:  · The apical impulses not displaced  · Heart tones are crisp and normal  · Cervical veins are not engorged  · The carotid upstroke is normal in amplitude and contour without delay or bruit  · SOFT S1S2, No S3, No obvious murmur  · Peripheral pulses are symmetrical and full  · There is no clubbing, cyanosis of the extremities. · No edema  · Pedal Pulses: 2+ and equal   Abdomen:  · No masses or tenderness  · Liver/Spleen: No Abnormalities Noted  Neurological/Psychiatric:  · Alert and oriented in all spheres  · Moves all extremities well  · No abnormalities of mood, affect, memory, mentation, or behavior are noted  Skin: warm and dry      Lab Results   Component Value Date    WBC 9.7 2020    HGB 11.3 (L) 2020    HCT 33.5 (L) 2020    MCV 93.1 2020     2020     Lab Results   Component Value Date    CREATININE <0.5 (L) 2020    BUN 16 2020     (L) 2020    K 3.9 2020    CL 92 (L) 2020    CO2 28 2020     Lab Results   Component Value Date    INR 1.23 (H) 2018    PROTIME 14.2 (H) 2018        EK20  I have reviewed EKG with the following interpretation: Impression: Atrial fibrillation with rapid ventricular responsePoor R wave progressioninferolateral subendocardial ischemia. Abnormal ECGWhen compared with ECG of 02-MAR-2019 10:34,Atrial fibrillation has replaced Sinus rhythmVent.  rate has abnormalities are noted. Normal left ventricular diastolic filling pressure. The right atrium is mildly dilated. Aortic sclerosis vs mild aortic stenosis. Normal systolic pulmonary artery pressure (SPAP) estimated at 37 mmHg (RA   pressure 8 mmHg). Assessment:  Xander Mike is a 70 y.o. patient who presented to [facility] 6/14/20 with c/o SOB and weakness. No known cardiovascular disease history. He has PMH HTN, HLD, lung cancer, COPD and remote history of intracranial hemorrhage 2 years ago with resultant left-sided weakness. Most recent ECHO 3/9/18 EF 60-65%; mild LVH, grade 2 diastolic dysfunction. Now presented with 3 day history of SOB, weakness, dizziness. Normally on 2L O2 PRN for COPD but using for last 2 days. In the ED his EKG revealed Atrial fibrillation with RV 119bpm; ST change inferolateral consider ischemia (3/19 EKG NSR). Note CXR with new left basilar lung infiltrate with associated pleural effusion; Mild pulmonary vascular congestion; troponins 0.03 and <0.01 x 3; TSH=0.37; Na+ 131, 128, K+ 3.2, 3.6. Urology doc placed shook catheter due to BPH and acute urinary retention. Note diltiazem gtt started but converted to NSR and due to low HR's stopped 6/15 morning. Diagnosis of new-onset atrial fibrillation in elderly male with hx COPD and possible PNA being r/o'd for Covid.     Recs:  1. Converted to NSR and tele with HR 70's bpm. Follow clinically only now. 2. Would d/c lovenox AC given back in NSR and prior hx of ICH 2 years ago. Not candidate for long-term AC based on this history. .   3. Covid 19 negative  4. Note ECHO 6/15/20 showed normal QZ=00-82%; normal diastolic filling pressure; mild AS with velocity=2.26m/s and mean pressure gradient=10mmHg. No further cardiac recs or testing at this time. Signing off. Will f/u PRN only. Call if questions. Thank you.       Patient Active Problem List   Diagnosis    BPH (benign prostatic hyperplasia)   

## 2020-06-16 NOTE — FLOWSHEET NOTE
06/16/20 1230   Vital Signs   Temp 98.9 °F (37.2 °C)   Temp Source Oral   Pulse 64   Heart Rate Source Monitor   Resp 22   BP (!) 146/73   BP Location Right upper arm   Level of Consciousness 0   MEWS Score 2   Patient Currently in Pain Denies   Oxygen Therapy   SpO2 96 %   O2 Device Nasal cannula   O2 Flow Rate (L/min) 2 L/min   Transfer from ICU.

## 2020-06-16 NOTE — PROGRESS NOTES
4 Eyes Skin Assessment     The patient is being assess for  Transfer to New Unit    I agree that 2 RN's have performed a thorough Head to Toe Skin Assessment on the patient. ALL assessment sites listed below have been assessed. Areas assessed by both nurses: Glorine Seeds and Irma  [x]   Head, Face, and Ears   [x]   Shoulders, Back, and Chest  [x]   Arms, Elbows, and Hands   [x]   Coccyx, Sacrum, and Ischum  [x]   Legs, Feet, and Heels        Does the Patient have Skin Breakdown?  scattered bruising.          Christopher Prevention initiated:  No   Wound Care Orders initiated:  No      WOC nurse consulted for Pressure Injury (Stage 3,4, Unstageable, DTI, NWPT, and Complex wounds):  No      Nurse 1 eSignature: Electronically signed by Meet Bazan RN on 6/16/20 at 11:10 AM EDT    **SHARE this note so that the co-signing nurse is able to place an eSignature**    Nurse 2 eSignature: Electronically signed by Rickey Vasquez RN on 6/16/20 at 12:44 PM EDT

## 2020-06-16 NOTE — PROGRESS NOTES
tolerated  · IV antibiotics to include D3 Zithromax/ceftriaxone  · Symbicort, Albuterol, Atrovent   · Electrolytes replacement   · Prednisone taper  · Echo showed EF 55%.   · Cardiology has been following- no AC per them  · Speech pathology evaluation  · GI prophylaxis: PPI   · DVT prophylaxis: Lovenox therapeutic dose   · MRSA prophylaxis: Bactroban  · Ok to leave ICU

## 2020-06-16 NOTE — PROGRESS NOTES
Urology Progress Note    Subjective: I am feeling better. HPI: Patient has been admitted for COPD exacerbation. A shook was placed for retention. P/E  BP (!) 147/62   Pulse 67   Temp 97.8 °F (36.6 °C) (Oral)   Resp 18   Ht 5' 7\" (1.702 m)   Wt 212 lb 8 oz (96.4 kg)   SpO2 93%   BMI 33.28 kg/m²   Skin: Intact  Respiratory: Breathing without difficulty, stable. GI: No acute changes, stable po intake. : Shook in  place. MSK: No acute changes, stable. Neuro: No acute changes, stable. Labs  Lab Results   Component Value Date    WBC 9.7 06/16/2020    HGB 11.3 06/16/2020    HCT 33.5 06/16/2020    MCV 93.1 06/16/2020     06/16/2020     Lab Results   Component Value Date     06/16/2020    K 3.9 06/16/2020    K 3.6 06/15/2020    CL 92 06/16/2020    CO2 28 06/16/2020    BUN 16 06/16/2020    CREATININE <0.5 06/16/2020    CALCIUM 8.6 06/16/2020       Assessment/Plan  Stable from . Possible transfer soon out of ICU. Voiding trial when up and around out of bed.     Electronically signed by Neelima Noel MD on 6/16/2020 at 9:22 AM

## 2020-06-17 ENCOUNTER — APPOINTMENT (OUTPATIENT)
Dept: GENERAL RADIOLOGY | Age: 80
DRG: 308 | End: 2020-06-17
Payer: MEDICARE

## 2020-06-17 LAB
ANION GAP SERPL CALCULATED.3IONS-SCNC: 9 MMOL/L (ref 3–16)
BASOPHILS ABSOLUTE: 0 K/UL (ref 0–0.2)
BASOPHILS RELATIVE PERCENT: 0 %
BLOOD CULTURE, ROUTINE: NORMAL
BUN BLDV-MCNC: 21 MG/DL (ref 7–20)
CALCIUM SERPL-MCNC: 8.6 MG/DL (ref 8.3–10.6)
CHLORIDE BLD-SCNC: 94 MMOL/L (ref 99–110)
CO2: 26 MMOL/L (ref 21–32)
CREAT SERPL-MCNC: 0.6 MG/DL (ref 0.8–1.3)
CULTURE, BLOOD 2: NORMAL
EOSINOPHILS ABSOLUTE: 0 K/UL (ref 0–0.6)
EOSINOPHILS RELATIVE PERCENT: 0 %
GFR AFRICAN AMERICAN: >60
GFR NON-AFRICAN AMERICAN: >60
GLUCOSE BLD-MCNC: 147 MG/DL (ref 70–99)
HCT VFR BLD CALC: 35 % (ref 40.5–52.5)
HEMOGLOBIN: 11.7 G/DL (ref 13.5–17.5)
LYMPHOCYTES ABSOLUTE: 0.4 K/UL (ref 1–5.1)
LYMPHOCYTES RELATIVE PERCENT: 5 %
MAGNESIUM: 2.3 MG/DL (ref 1.8–2.4)
MCH RBC QN AUTO: 31.2 PG (ref 26–34)
MCHC RBC AUTO-ENTMCNC: 33.5 G/DL (ref 31–36)
MCV RBC AUTO: 93.1 FL (ref 80–100)
MONOCYTES ABSOLUTE: 0 K/UL (ref 0–1.3)
MONOCYTES RELATIVE PERCENT: 0 %
NEUTROPHILS ABSOLUTE: 7.7 K/UL (ref 1.7–7.7)
NEUTROPHILS RELATIVE PERCENT: 95 %
PDW BLD-RTO: 13.2 % (ref 12.4–15.4)
PHOSPHORUS: 2.2 MG/DL (ref 2.5–4.9)
PLATELET # BLD: 220 K/UL (ref 135–450)
PLATELET SLIDE REVIEW: ADEQUATE
PMV BLD AUTO: 7 FL (ref 5–10.5)
POTASSIUM SERPL-SCNC: 4.2 MMOL/L (ref 3.5–5.1)
RBC # BLD: 3.75 M/UL (ref 4.2–5.9)
SLIDE REVIEW: ABNORMAL
SODIUM BLD-SCNC: 129 MMOL/L (ref 136–145)
WBC # BLD: 8.1 K/UL (ref 4–11)

## 2020-06-17 PROCEDURE — 6370000000 HC RX 637 (ALT 250 FOR IP): Performed by: INTERNAL MEDICINE

## 2020-06-17 PROCEDURE — 36415 COLL VENOUS BLD VENIPUNCTURE: CPT

## 2020-06-17 PROCEDURE — 80048 BASIC METABOLIC PNL TOTAL CA: CPT

## 2020-06-17 PROCEDURE — 85025 COMPLETE CBC W/AUTO DIFF WBC: CPT

## 2020-06-17 PROCEDURE — 97116 GAIT TRAINING THERAPY: CPT

## 2020-06-17 PROCEDURE — 92526 ORAL FUNCTION THERAPY: CPT

## 2020-06-17 PROCEDURE — 84100 ASSAY OF PHOSPHORUS: CPT

## 2020-06-17 PROCEDURE — 97530 THERAPEUTIC ACTIVITIES: CPT

## 2020-06-17 PROCEDURE — C9113 INJ PANTOPRAZOLE SODIUM, VIA: HCPCS | Performed by: INTERNAL MEDICINE

## 2020-06-17 PROCEDURE — 92611 MOTION FLUOROSCOPY/SWALLOW: CPT

## 2020-06-17 PROCEDURE — 94640 AIRWAY INHALATION TREATMENT: CPT

## 2020-06-17 PROCEDURE — 97535 SELF CARE MNGMENT TRAINING: CPT

## 2020-06-17 PROCEDURE — 2580000003 HC RX 258: Performed by: INTERNAL MEDICINE

## 2020-06-17 PROCEDURE — 6360000002 HC RX W HCPCS: Performed by: INTERNAL MEDICINE

## 2020-06-17 PROCEDURE — 2700000000 HC OXYGEN THERAPY PER DAY

## 2020-06-17 PROCEDURE — 74230 X-RAY XM SWLNG FUNCJ C+: CPT

## 2020-06-17 PROCEDURE — 94761 N-INVAS EAR/PLS OXIMETRY MLT: CPT

## 2020-06-17 PROCEDURE — 99232 SBSQ HOSP IP/OBS MODERATE 35: CPT | Performed by: INTERNAL MEDICINE

## 2020-06-17 PROCEDURE — 99233 SBSQ HOSP IP/OBS HIGH 50: CPT | Performed by: INTERNAL MEDICINE

## 2020-06-17 PROCEDURE — 97166 OT EVAL MOD COMPLEX 45 MIN: CPT

## 2020-06-17 PROCEDURE — 2060000000 HC ICU INTERMEDIATE R&B

## 2020-06-17 PROCEDURE — 97161 PT EVAL LOW COMPLEX 20 MIN: CPT

## 2020-06-17 PROCEDURE — 83735 ASSAY OF MAGNESIUM: CPT

## 2020-06-17 RX ORDER — ALBUTEROL SULFATE 90 UG/1
2 AEROSOL, METERED RESPIRATORY (INHALATION) EVERY 4 HOURS PRN
Status: DISCONTINUED | OUTPATIENT
Start: 2020-06-17 | End: 2020-06-19 | Stop reason: HOSPADM

## 2020-06-17 RX ORDER — IPRATROPIUM BROMIDE AND ALBUTEROL SULFATE 2.5; .5 MG/3ML; MG/3ML
1 SOLUTION RESPIRATORY (INHALATION)
Status: DISCONTINUED | OUTPATIENT
Start: 2020-06-17 | End: 2020-06-19 | Stop reason: HOSPADM

## 2020-06-17 RX ADMIN — IPRATROPIUM BROMIDE AND ALBUTEROL SULFATE 1 AMPULE: .5; 3 SOLUTION RESPIRATORY (INHALATION) at 23:03

## 2020-06-17 RX ADMIN — Medication 2 PUFF: at 10:56

## 2020-06-17 RX ADMIN — ESCITALOPRAM OXALATE 10 MG: 10 TABLET ORAL at 08:19

## 2020-06-17 RX ADMIN — Medication 2 PUFF: at 07:25

## 2020-06-17 RX ADMIN — PANTOPRAZOLE SODIUM 40 MG: 40 INJECTION, POWDER, FOR SOLUTION INTRAVENOUS at 08:19

## 2020-06-17 RX ADMIN — FINASTERIDE 5 MG: 5 TABLET, FILM COATED ORAL at 08:19

## 2020-06-17 RX ADMIN — SODIUM CHLORIDE: 9 INJECTION, SOLUTION INTRAVENOUS at 18:23

## 2020-06-17 RX ADMIN — ENOXAPARIN SODIUM 40 MG: 40 INJECTION SUBCUTANEOUS at 08:21

## 2020-06-17 RX ADMIN — ACETAMINOPHEN 650 MG: 325 TABLET ORAL at 21:16

## 2020-06-17 RX ADMIN — METOPROLOL TARTRATE 25 MG: 25 TABLET, FILM COATED ORAL at 20:07

## 2020-06-17 RX ADMIN — POTASSIUM CHLORIDE 20 MEQ: 750 TABLET, EXTENDED RELEASE ORAL at 08:18

## 2020-06-17 RX ADMIN — ATORVASTATIN CALCIUM 20 MG: 10 TABLET, FILM COATED ORAL at 20:07

## 2020-06-17 RX ADMIN — ASPIRIN 81 MG 81 MG: 81 TABLET ORAL at 08:19

## 2020-06-17 RX ADMIN — Medication 2 PUFF: at 14:41

## 2020-06-17 RX ADMIN — METHYLPREDNISOLONE SODIUM SUCCINATE 40 MG: 40 INJECTION, POWDER, FOR SOLUTION INTRAMUSCULAR; INTRAVENOUS at 18:23

## 2020-06-17 RX ADMIN — Medication 2 PUFF: at 19:38

## 2020-06-17 RX ADMIN — TAMSULOSIN HYDROCHLORIDE 0.8 MG: 0.4 CAPSULE ORAL at 08:18

## 2020-06-17 RX ADMIN — METHYLPREDNISOLONE SODIUM SUCCINATE 40 MG: 40 INJECTION, POWDER, FOR SOLUTION INTRAMUSCULAR; INTRAVENOUS at 05:00

## 2020-06-17 RX ADMIN — Medication 10 ML: at 20:07

## 2020-06-17 RX ADMIN — Medication 10 ML: at 08:23

## 2020-06-17 RX ADMIN — METOPROLOL TARTRATE 25 MG: 25 TABLET, FILM COATED ORAL at 08:18

## 2020-06-17 RX ADMIN — AZITHROMYCIN MONOHYDRATE 500 MG: 250 TABLET ORAL at 08:18

## 2020-06-17 RX ADMIN — MULTIPLE VITAMINS W/ MINERALS TAB 1 TABLET: TAB at 08:19

## 2020-06-17 RX ADMIN — CEFTRIAXONE SODIUM 1 G: 1 INJECTION, POWDER, FOR SOLUTION INTRAMUSCULAR; INTRAVENOUS at 20:07

## 2020-06-17 RX ADMIN — IPRATROPIUM BROMIDE AND ALBUTEROL SULFATE 1 AMPULE: .5; 3 SOLUTION RESPIRATORY (INHALATION) at 19:37

## 2020-06-17 ASSESSMENT — PAIN DESCRIPTION - PAIN TYPE: TYPE: ACUTE PAIN

## 2020-06-17 ASSESSMENT — PAIN DESCRIPTION - FREQUENCY: FREQUENCY: INTERMITTENT

## 2020-06-17 ASSESSMENT — PAIN - FUNCTIONAL ASSESSMENT: PAIN_FUNCTIONAL_ASSESSMENT: ACTIVITIES ARE NOT PREVENTED

## 2020-06-17 ASSESSMENT — PAIN DESCRIPTION - LOCATION: LOCATION: BACK;LEG

## 2020-06-17 ASSESSMENT — PAIN DESCRIPTION - ORIENTATION: ORIENTATION: LOWER;RIGHT;LEFT

## 2020-06-17 ASSESSMENT — PAIN DESCRIPTION - PROGRESSION: CLINICAL_PROGRESSION: GRADUALLY WORSENING

## 2020-06-17 ASSESSMENT — PAIN SCALES - GENERAL
PAINLEVEL_OUTOF10: 0
PAINLEVEL_OUTOF10: 6

## 2020-06-17 ASSESSMENT — PAIN DESCRIPTION - DESCRIPTORS: DESCRIPTORS: ACHING;DISCOMFORT

## 2020-06-17 ASSESSMENT — PAIN DESCRIPTION - ONSET: ONSET: GRADUAL

## 2020-06-17 NOTE — PROGRESS NOTES
Pulmonary & Critical Care Medicine Progress Note  CC:acute resp failure    Events of Last 24 hours: patient with increased wheezing this am.     Invasive Lines:   IV Line: PIvs    EXAM:  BP (!) 160/74   Pulse 68   Temp 97.4 °F (36.3 °C) (Oral)   Resp 18   Ht 5' 7\" (1.702 m)   Wt 220 lb 3.2 oz (99.9 kg)   SpO2 94%   BMI 34.49 kg/m²  on 2l NC  Tmax: 98.9  CVP:      Intake/Output Summary (Last 24 hours) at 6/17/2020 1100  Last data filed at 6/17/2020 1006  Gross per 24 hour   Intake 2020 ml   Output 600 ml   Net 1420 ml     General:Moderate resp distress NCAT  Eyes: PERRL. No sclera icterus. No conjunctival injection. ENT: No discharge. Pharynx clear. Neck: Trachea midline. Normal thyroid. Resp: No accessory muscle use. No crackles, diffuse bilateral wheezing. No rhonchi. No dullness on percussion. CV: colt rate. Regular rhythm. No mumur or rub. trace edema. GI: Non-tender. Non-distended. No anton  Skin: Warm and dry. No nodule on exposed extremities. No rash on exposed extremities. Lymph: No cervical LAD. No supraclavicular LAD. M/S: No cyanosis. No joint deformity. No clubbing. Neuro: Awake. Moves all extremities, conversant.      Medications:   azithromycin  500 mg Oral Daily    enoxaparin  40 mg Subcutaneous Daily    ipratropium  2 puff Inhalation 4x daily    albuterol sulfate HFA  2 puff Inhalation 4x daily    aspirin  81 mg Oral Daily    atorvastatin  20 mg Oral Nightly    escitalopram  10 mg Oral Daily    finasteride  5 mg Oral Daily    cefTRIAXone (ROCEPHIN) IV  1 g Intravenous Q24H    sodium chloride flush  10 mL Intravenous 2 times per day    budesonide-formoterol  2 puff Inhalation BID    lactulose  10 g Oral QPM    metoprolol tartrate  25 mg Oral BID    therapeutic multivitamin-minerals  1 tablet Oral Daily    polyethylene glycol  17 g Oral Daily    potassium chloride  20 mEq Oral Daily with breakfast    tamsulosin  0.8 mg Oral Daily    traZODone  50 mg Oral Nightly    methylPREDNISolone  40 mg Intravenous Q12H    pantoprazole  40 mg Intravenous Daily     PRN Meds:  albuterol sulfate HFA, magnesium sulfate, potassium chloride **OR** potassium alternative oral replacement **OR** potassium chloride, sodium chloride flush, acetaminophen **OR** acetaminophen, perflutren lipid microspheres    Results:  CBC:   Recent Labs     06/15/20  0438 06/16/20 0437 06/17/20  0448   WBC 11.0 9.7 8.1   HGB 11.6* 11.3* 11.7*   HCT 34.5* 33.5* 35.0*   MCV 92.5 93.1 93.1    208 220     BMP:   Recent Labs     06/15/20  0438 06/16/20  0437 06/17/20  0448   * 128* 129*   K 3.6  3.6 3.9 4.2   CL 89* 92* 94*   CO2 31 28 26   PHOS 2.4* 2.2* 2.2*   BUN 14 16 21*   CREATININE <0.5* <0.5* 0.6*     LIVER PROFILE:   Recent Labs     06/15/20  0438   AST 20   ALT 9*   BILITOT 0.3   ALKPHOS 44     PT/INR: No results for input(s): PROTIME, INR in the last 72 hours. APTT: No results for input(s): APTT in the last 72 hours.   UA:  Recent Labs     06/14/20  1245   COLORU Yellow   PHUR 6.0   WBCUA 6-9*   RBCUA 3-4   MUCUS 1+*   BACTERIA 1+*   CLARITYU Clear   SPECGRAV 1.010   LEUKOCYTESUR Negative   UROBILINOGEN 0.2   BILIRUBINUR Negative   BLOODU SMALL*   GLUCOSEU Negative       Cultures:  Blood 6/13: ngtd  sars Cov2 6/14- not detected    Films:  CXR 6/13 reviewed by me and it showed - minimal L retrocardiac ASD       CT head 6-13  No acute intracranial abnormalities    ASSESSMENT:  · Acute hypoxemic respiratory failure  · Severe COPD with acute exacerbation  · Abnormal chest x-ray-atelectasis versus pneumonia  · A. fib with RVR- now NSR  · Electrolytes disorder  · Elevated troponin  · Probable dysphagia  · ICH 2 years ago with residual left upper extremity weakness        PLAN:  · Supplemental oxygen to maintain SaO2 >92%; wean as tolerated  · IV antibiotics to include D4 Zithromax/ceftriaxone  · Symbicort, Albuterol, Atrovent   · Electrolytes replacement   · Prednisone taper  · Echo showed EF

## 2020-06-17 NOTE — PROGRESS NOTES
Proprioception:  WFL    Coordination and Tone  WFL    Balance  Functional Sitting Balance:  WFL  Functional Standing Balance:Impaired - CGA with RW    Bed mobility:    Supine to sit:   CGA with cues for technique  Sit to supine:   Not Tested  Rolling:    CGA  Scooting in sitting:  CGA with cues  Scooting to head of bed:   Not Tested    Bridging:   Not Tested    Transfers:    Sit to stand:  CGA and VC for hand placement  Stand to sit:  CGA  Bed to chair:   CGA, with use of RW and VC for hand placement  Standard toilet: Not Tested  Bed to [facility]:  Not Tested     Completed functional mobility in room with RW with CGA and cues/encouragement provided. Dressing:      UE:   Not Tested  LE:    Max A    Bathing:    UE:  Not Tested  LE:  Not Tested    Eating:   Not Tested    Toileting:  Not Tested - shook in place    Activity Tolerance   Pt completed therapy session with Spo2 = 83% initially in bed (nasal cannula had slipped out of L side of nose); improved to 91% with nasal cannula replaced and some PLB  SpO2: 83% to 97% (up to 97% after functional mobility in room on 2L)  HR: 58-70    Patient fatigued at end of session. Positioning Needs:   Reclined in chair with call light and needs in reach. Alarm Set    Exercise / Activities Initiated:   N/A    Patient/Family Education:   Role of OT  Recommendations for DC  Energy conservation techniques  Safe RW use/hand placement  Oxygen tubing management    Assessment of Deficits: Pt seen for Occupational therapy evaluation in acute care setting. Pt demonstrated decreased Activity tolerance, ADLs, IADLs, Balance , Bed mobility, Strength, Transfers and Coping Skills. Pt functioning below baseline and will likely benefit from skilled occupational therapy services to maximize safety and independence. Goal(s) : To be met in 3 Visits:  1). Bed to toilet/BSC: Supervision    To be met in 5 Visits:  1). Supine to/from Sit:  Supervision  2). Upper Body Bathing:   SBA  3).  Lower

## 2020-06-17 NOTE — PROGRESS NOTES
Pt resting in bed this evening watching TV. Shift assessment complete. All meds given per [facility]. Pt requested Tylenol for headache. Pt refused Trazodone at bedtime. All belongings within reach. Call light within reach. Pt denies any further needs at this time. Will continue to monitor and assess.

## 2020-06-17 NOTE — PROGRESS NOTES
Pt. Back from modified barium swallow test. Pt. Stable at this time and denies needs.  France Baires RN

## 2020-06-17 NOTE — CARE COORDINATION
INTERDISCIPLINARY PLAN OF CARE CONFERENCE    Date/Time: 6/17/2020 3:26 PM  Completed by: Vivi Javed, Case Management      Patient Name:  Bita Marinelli  YOB: 1940  Admitting Diagnosis: Acute respiratory failure with hypoxia ([facility] [J96.01]     Admit Date/Time:  6/13/2020  8:22 PM    Chart reviewed. Interdisciplinary team met to discuss patient progress and discharge plans. Disciplines included Case Management, Nursing, and Dietitian. Current Status:stable    PT/OT recommendation:SNF    Anticipated Discharge Date: TBD  Expected D/C Disposition:  Skilled nursing facility  Confirmed plan with patient and/or family Yes  Discharge Plan Comments: Reviewed chart. Met with the pt. Pt chose [facility] for STR. Referral called to [facility] and referral faxed to [facility]. Second choice is [facility]. Following.       The Plan for Transition of Care is related to the following treatment goals: STR    The Patient and/or patient representative  was provided with a choice of provider and agrees   with the discharge plan. [] Yes [x] No      Home O2 in place on admit: to STR  Pt informed of need to bring portable home O2 tank on day of discharge for nursing to connect prior to leaving:  Not Indicated  Verbalized agreement/Understanding:  Not Indicated

## 2020-06-17 NOTE — PROGRESS NOTES
Tub/Shower unit, Tub Transfer Bench and Raised toilet seat w/ arms  Equipment owned:      [employer] (RW), manual W/C, home O2 (sock aid L) continuous, pulse ox, reacher and sock aid, LH shoehorn, adjustable bed - manual w/c on order to use for energy conservation     Preadmission Status / PLOF:  History of falls             No  Pt. Able to drive          No - wife usually drives  Pt Fully independent with ADL's         No - HHA assists with bathing 1x/week, typically able to manage dressing independently (only wears t-shirt and boxer shorts)   Pt. Required assistance from wife and  (1x/week) for:  Cleaning, Cooking and Laundry     Pt. Fully independent for transfers and gait and walked with: Sergio Marie     Pain  No  Rating:NA  Location:  Pain Medicine Status: No request made       Cognition    A&O x4   Able to follow 1 step commands   States he is nervous about getting up because he has been in the hospital for 2 weeks (actually presented to [facility] 6/13/20).    Subjective  Patient lying supine in bed with no family present. Pt agreeable to this PT eval & tx. Upper Extremity ROM/Strength  Please see OT evaluation.   (residual L UE weakness)    Lower Extremity ROM / Strength   AROM WFL: No  ROM limitations: lacking ~ 10 deg knee extension bilaterally 2/2 HS tightness   L ankle minimal ROM due to hx of CVA    Strength Assessment (measured on a 0-5 scale):  R LE   Quad   4+   Ant Tib  5/5   Hamstring 4+   Iliopsoas 4  L LE  Quad   4+   Ant Tib  4+   Hamstring 4   Iliopsoas 4-    Lower Extremity Sensation    WNL    Lower Extremity Proprioception:   WNL    Coordination and Tone  WFL    Balance  Sitting:  Good - ; Supervision  Comments: 6 minutes at EOB with B UE support, pt states he is fearful of falling while sitting EOB    Standing: Fair +; CGA  Comments: 2 minutes with B UE support, initially leaning posteriorly against bed for support (likely due to fear of falling) but continues to require only

## 2020-06-17 NOTE — PROGRESS NOTES
Patient is not able to demonstrated the ability to move from a reclining position to an upright position within the recliner. however patient is alert, oriented and able to provide informed consent.  [facility]

## 2020-06-17 NOTE — PROCEDURES
PROCEDURE NOTE     Patient Name:  Sheryl Evans  :  1940  Date:  20  Time:  2:57 PM          Pre-Operative Diagnosis: Difficult Shook, retention, bph  Post-Operative Diagnosis: same     Procedure Performed: Placement of a Shook catheter, complicated      Surgeon:  Jeannie Perez MD  Anesthesia: Intraurethral jelly (10 mL)  Specimens: None  Estimated Blood Loss: None  Complications: None     Indications:  See consult. Description of Procedure: Verbal informed consent was obtained at bedside due to urgent nature of procedure. The patient was kept in a supine position. His penis and urethral meatus were then prepped and draped in the standard fashion. Intra-urethral lidocaine jelly was injected into the urethral meatus for patient comfort and to provide adequate lubrication. An 25 Tamazight coude Shook catheter was then inserted into the urethra. Gentle pressure was used to advance shook through the anterior and posterior urethral and catheter was steered into the bladder. Then, 10 mL of sterile water was placed in the shook balloon. The Shook was placed to a drainage bag and was secured to his leg per routine. He tolerated the procedure well without any complications.          Findings:   External genitalia was normal without lesions   Urethra: No evidence of stricture based on lack of resistance with Shook advancement    Plan:  -350cc clear urine drained  -Per consult note  -attempt voiding trial in 2-3 days when clinical improves      Ester Bartholomew MD  Office: [phone]
of SLP, results of assessment, diet recommendatiosn and POC  Patient Education Response: Verbalizes understanding    Prognosis  Prognosis for safe diet advancement: good  Barriers to reach goals: fatigue;other (comment)  Barriers/Prognosis Comment: respiratory status  Duration/Frequency of Treatment  Duration/Frequency of Treatment: 2-4x/wk for LOS  Safety Devices  Safety Devices in place: Not Applicable      Goals:    Long Term:   Timeframe for Long-term Goals: 1 week (6/22)  Goal 1: Pt will tolerate least restricive diet w/o s/s of penetraion/aspiration while maintaining respiratory status. Short Term:  Dysphagia Goals: The patient will tolerate recommended diet without observed clinical signs of aspiration; The patient will recall and perform compensatory strategies, with no cues.      Pain   Patient Currently in Pain: Denies  Pain Level: 6  Pain Type: Acute pain  Pain Location: Back, Leg      Therapy Time:   Individual Concurrent Group Co-treatment   Time In 1105         Time Out 1130         Minutes 700 [employer], 6/17/2020, 12:56 PM

## 2020-06-17 NOTE — PROGRESS NOTES
to pneumonia with COPD component, supplemental O2 to maintain SPO2 ? 92%, continuous pulse ox. Patient is normally on 2L O2 at home ON only, recently requiring during day. He is requiring 2-4 L to maintain sats. Wean as tolerated. Pulm c/s. 2. Possible pneumonia on left lung - CAP - likely gram positive orgnanism            started on rocephin and azithromycin. cx obtained. covid ruled out. pulm consulted        No sepsis. slp eval    3. A. fib with RVR, new onset, rate in 120s-130's in the ER. Started on Cardizem drip with bolus, converted to NSR . off gtt. Resumed oral BB. tsh wnl. Echo pending. Cards consulted. No AC with hx of ICH in the past and short duration of Afibb    4. Near syncopal episode at home, fall precautions, check orthostatics when more mobile    5. Hypokalemia, 3.2, replaced    6. Indeterminate troponin, 0.03, telemetry and serial tropes. Possibly related to rate. 7. Leukocytosis, 12.9, monitor. Improved   8. History of grade 2 dCHF (see echo above from 2018), added on BNP and it is pending. Patient actually appears clinically dry at this time so I doubt this is primary etiology. 9. Urinary retention - placed shook by urology.  Keep it for now      DVT Prophylaxis: ;lovenox  Diet: low salt  Code Status: Full Code    Dc planning in 2 days        Zeferino Fields MD 6/17/2020 3:55 PM

## 2020-06-17 NOTE — PROGRESS NOTES
Pt. In bed awake. Audible wheezes noted. Pt. States that is normal for him. Pt. Assessment completed- see flow sheet. Pt. denies further needs at this time. Will continue to monitor. Call light is within reach.   Rey Oakley RN

## 2020-06-18 LAB
ANION GAP SERPL CALCULATED.3IONS-SCNC: 7 MMOL/L (ref 3–16)
BASOPHILS ABSOLUTE: 0 K/UL (ref 0–0.2)
BASOPHILS RELATIVE PERCENT: 0 %
BUN BLDV-MCNC: 22 MG/DL (ref 7–20)
CALCIUM SERPL-MCNC: 8.1 MG/DL (ref 8.3–10.6)
CHLORIDE BLD-SCNC: 99 MMOL/L (ref 99–110)
CO2: 26 MMOL/L (ref 21–32)
CREAT SERPL-MCNC: <0.5 MG/DL (ref 0.8–1.3)
EOSINOPHILS ABSOLUTE: 0 K/UL (ref 0–0.6)
EOSINOPHILS RELATIVE PERCENT: 0 %
GFR AFRICAN AMERICAN: >60
GFR NON-AFRICAN AMERICAN: >60
GLUCOSE BLD-MCNC: 135 MG/DL (ref 70–99)
HCT VFR BLD CALC: 35.6 % (ref 40.5–52.5)
HEMOGLOBIN: 11.3 G/DL (ref 13.5–17.5)
LYMPHOCYTES ABSOLUTE: 0.6 K/UL (ref 1–5.1)
LYMPHOCYTES RELATIVE PERCENT: 8 %
MAGNESIUM: 2.4 MG/DL (ref 1.8–2.4)
MCH RBC QN AUTO: 29.8 PG (ref 26–34)
MCHC RBC AUTO-ENTMCNC: 31.7 G/DL (ref 31–36)
MCV RBC AUTO: 94.1 FL (ref 80–100)
MONOCYTES ABSOLUTE: 0.2 K/UL (ref 0–1.3)
MONOCYTES RELATIVE PERCENT: 3 %
NEUTROPHILS ABSOLUTE: 6.4 K/UL (ref 1.7–7.7)
NEUTROPHILS RELATIVE PERCENT: 89 %
PDW BLD-RTO: 13.7 % (ref 12.4–15.4)
PHOSPHORUS: 2.1 MG/DL (ref 2.5–4.9)
PLATELET # BLD: 206 K/UL (ref 135–450)
PLATELET SLIDE REVIEW: ADEQUATE
PMV BLD AUTO: 7 FL (ref 5–10.5)
POTASSIUM SERPL-SCNC: 4.4 MMOL/L (ref 3.5–5.1)
RBC # BLD: 3.79 M/UL (ref 4.2–5.9)
SLIDE REVIEW: ABNORMAL
SODIUM BLD-SCNC: 132 MMOL/L (ref 136–145)
WBC # BLD: 7.2 K/UL (ref 4–11)

## 2020-06-18 PROCEDURE — 36415 COLL VENOUS BLD VENIPUNCTURE: CPT

## 2020-06-18 PROCEDURE — 6370000000 HC RX 637 (ALT 250 FOR IP): Performed by: INTERNAL MEDICINE

## 2020-06-18 PROCEDURE — 97530 THERAPEUTIC ACTIVITIES: CPT

## 2020-06-18 PROCEDURE — 80048 BASIC METABOLIC PNL TOTAL CA: CPT

## 2020-06-18 PROCEDURE — 2580000003 HC RX 258: Performed by: INTERNAL MEDICINE

## 2020-06-18 PROCEDURE — 6360000002 HC RX W HCPCS: Performed by: INTERNAL MEDICINE

## 2020-06-18 PROCEDURE — C9113 INJ PANTOPRAZOLE SODIUM, VIA: HCPCS | Performed by: INTERNAL MEDICINE

## 2020-06-18 PROCEDURE — 97535 SELF CARE MNGMENT TRAINING: CPT

## 2020-06-18 PROCEDURE — 85025 COMPLETE CBC W/AUTO DIFF WBC: CPT

## 2020-06-18 PROCEDURE — 94640 AIRWAY INHALATION TREATMENT: CPT

## 2020-06-18 PROCEDURE — 2060000000 HC ICU INTERMEDIATE R&B

## 2020-06-18 PROCEDURE — 84100 ASSAY OF PHOSPHORUS: CPT

## 2020-06-18 PROCEDURE — 92526 ORAL FUNCTION THERAPY: CPT

## 2020-06-18 PROCEDURE — 2700000000 HC OXYGEN THERAPY PER DAY

## 2020-06-18 PROCEDURE — 83735 ASSAY OF MAGNESIUM: CPT

## 2020-06-18 PROCEDURE — 99233 SBSQ HOSP IP/OBS HIGH 50: CPT | Performed by: INTERNAL MEDICINE

## 2020-06-18 PROCEDURE — 99232 SBSQ HOSP IP/OBS MODERATE 35: CPT | Performed by: INTERNAL MEDICINE

## 2020-06-18 PROCEDURE — 94761 N-INVAS EAR/PLS OXIMETRY MLT: CPT

## 2020-06-18 RX ORDER — PREDNISONE 20 MG/1
40 TABLET ORAL DAILY
Status: DISCONTINUED | OUTPATIENT
Start: 2020-06-19 | End: 2020-06-19 | Stop reason: HOSPADM

## 2020-06-18 RX ORDER — AMLODIPINE BESYLATE 5 MG/1
5 TABLET ORAL DAILY
Status: DISCONTINUED | OUTPATIENT
Start: 2020-06-18 | End: 2020-06-19 | Stop reason: HOSPADM

## 2020-06-18 RX ADMIN — POTASSIUM CHLORIDE 20 MEQ: 750 TABLET, EXTENDED RELEASE ORAL at 08:52

## 2020-06-18 RX ADMIN — Medication 2 PUFF: at 07:17

## 2020-06-18 RX ADMIN — FINASTERIDE 5 MG: 5 TABLET, FILM COATED ORAL at 08:52

## 2020-06-18 RX ADMIN — IPRATROPIUM BROMIDE AND ALBUTEROL SULFATE 1 AMPULE: .5; 3 SOLUTION RESPIRATORY (INHALATION) at 23:12

## 2020-06-18 RX ADMIN — METOPROLOL TARTRATE 25 MG: 25 TABLET, FILM COATED ORAL at 21:21

## 2020-06-18 RX ADMIN — Medication 10 ML: at 08:49

## 2020-06-18 RX ADMIN — AZITHROMYCIN MONOHYDRATE 500 MG: 250 TABLET ORAL at 08:52

## 2020-06-18 RX ADMIN — MULTIPLE VITAMINS W/ MINERALS TAB 1 TABLET: TAB at 08:52

## 2020-06-18 RX ADMIN — IPRATROPIUM BROMIDE AND ALBUTEROL SULFATE 1 AMPULE: .5; 3 SOLUTION RESPIRATORY (INHALATION) at 10:57

## 2020-06-18 RX ADMIN — SODIUM CHLORIDE: 9 INJECTION, SOLUTION INTRAVENOUS at 05:53

## 2020-06-18 RX ADMIN — IPRATROPIUM BROMIDE AND ALBUTEROL SULFATE 1 AMPULE: .5; 3 SOLUTION RESPIRATORY (INHALATION) at 14:59

## 2020-06-18 RX ADMIN — PANTOPRAZOLE SODIUM 40 MG: 40 INJECTION, POWDER, FOR SOLUTION INTRAVENOUS at 08:48

## 2020-06-18 RX ADMIN — METOPROLOL TARTRATE 25 MG: 25 TABLET, FILM COATED ORAL at 08:52

## 2020-06-18 RX ADMIN — ACETAMINOPHEN 650 MG: 325 TABLET ORAL at 21:21

## 2020-06-18 RX ADMIN — ASPIRIN 81 MG 81 MG: 81 TABLET ORAL at 08:52

## 2020-06-18 RX ADMIN — ATORVASTATIN CALCIUM 20 MG: 10 TABLET, FILM COATED ORAL at 21:21

## 2020-06-18 RX ADMIN — LACTULOSE 10 G: 10 SOLUTION ORAL at 17:57

## 2020-06-18 RX ADMIN — METHYLPREDNISOLONE SODIUM SUCCINATE 40 MG: 40 INJECTION, POWDER, FOR SOLUTION INTRAMUSCULAR; INTRAVENOUS at 05:53

## 2020-06-18 RX ADMIN — AMLODIPINE BESYLATE 5 MG: 5 TABLET ORAL at 11:17

## 2020-06-18 RX ADMIN — ACETAMINOPHEN 650 MG: 325 TABLET ORAL at 14:27

## 2020-06-18 RX ADMIN — IPRATROPIUM BROMIDE AND ALBUTEROL SULFATE 1 AMPULE: .5; 3 SOLUTION RESPIRATORY (INHALATION) at 20:17

## 2020-06-18 RX ADMIN — CEFTRIAXONE SODIUM 1 G: 1 INJECTION, POWDER, FOR SOLUTION INTRAMUSCULAR; INTRAVENOUS at 21:21

## 2020-06-18 RX ADMIN — ENOXAPARIN SODIUM 40 MG: 40 INJECTION SUBCUTANEOUS at 08:48

## 2020-06-18 RX ADMIN — Medication 10 ML: at 21:21

## 2020-06-18 RX ADMIN — TAMSULOSIN HYDROCHLORIDE 0.8 MG: 0.4 CAPSULE ORAL at 08:50

## 2020-06-18 RX ADMIN — ESCITALOPRAM OXALATE 10 MG: 10 TABLET ORAL at 08:51

## 2020-06-18 RX ADMIN — IPRATROPIUM BROMIDE AND ALBUTEROL SULFATE 1 AMPULE: .5; 3 SOLUTION RESPIRATORY (INHALATION) at 07:15

## 2020-06-18 ASSESSMENT — PAIN DESCRIPTION - FREQUENCY: FREQUENCY: CONTINUOUS

## 2020-06-18 ASSESSMENT — PAIN DESCRIPTION - ONSET: ONSET: ON-GOING

## 2020-06-18 ASSESSMENT — PAIN SCALES - GENERAL
PAINLEVEL_OUTOF10: 0
PAINLEVEL_OUTOF10: 0
PAINLEVEL_OUTOF10: 6
PAINLEVEL_OUTOF10: 1
PAINLEVEL_OUTOF10: 4

## 2020-06-18 ASSESSMENT — PAIN DESCRIPTION - PROGRESSION: CLINICAL_PROGRESSION: GRADUALLY WORSENING

## 2020-06-18 ASSESSMENT — PAIN - FUNCTIONAL ASSESSMENT: PAIN_FUNCTIONAL_ASSESSMENT: ACTIVITIES ARE NOT PREVENTED

## 2020-06-18 ASSESSMENT — PAIN DESCRIPTION - LOCATION: LOCATION: BACK

## 2020-06-18 ASSESSMENT — PAIN DESCRIPTION - PAIN TYPE: TYPE: ACUTE PAIN

## 2020-06-18 ASSESSMENT — PAIN DESCRIPTION - ORIENTATION: ORIENTATION: LOWER;MID

## 2020-06-18 ASSESSMENT — PAIN DESCRIPTION - DESCRIPTORS: DESCRIPTORS: ACHING;DISCOMFORT

## 2020-06-18 NOTE — CARE COORDINATION
INTERDISCIPLINARY PLAN OF CARE CONFERENCE    Date/Time: 6/18/2020 3:12 PM  Completed by: Chrystal Sosa, Case Management      Patient Name:  Wilman Crawford  YOB: 1940  Admitting Diagnosis: Acute respiratory failure with hypoxia ([facility] [address] [J96.01]     Admit Date/Time:  6/13/2020  8:22 PM    Chart reviewed. Interdisciplinary team met to discuss patient progress and discharge plans. Disciplines included Case Management, Nursing, and Dietitian. Current Status:stable on 2L    PT/OT recommendation:SNF    Anticipated Discharge Date: tbd  Expected D/C Disposition:  Skilled nursing facility  Confirmed plan with patient and/or family Yes-pt  Discharge Plan Comments: Chart reviewed. Plan continues for STR @ [facility]. TC from [facility] @ [facility] and she can accept the pt for STR. Per Tressa Ek test from 6/14 is fine no new test needed. +bed    COVID 6/14 not detected  The Plan for Transition of Care is related to the following treatment goals: STR @ [facility]    The Patient and/or patient representative --pt was provided with a choice of provider and agrees   with the discharge plan. [x] Yes [] No    Freedom of choice list was provided with basic dialogue that supports the patient's individualized plan of care/goals, treatment preferences and shares the quality data associated with the providers.  [x] Yes [] No    Home O2 in place on admit: ecf  Pt informed of need to bring portable home O2 tank on day of discharge for nursing to connect prior to leaving:  Not Indicated  Verbalized agreement/Understanding:  Not Indicated

## 2020-06-18 NOTE — PROGRESS NOTES
Occupational Therapy Daily Treatment Note    Unit: PCU  Date:  6/18/2020  Patient Name:    Timothy Arguello  Admitting diagnosis:  Acute respiratory failure with hypoxia ([facility] [J96.01]  Admit Date:  6/13/2020  Precautions/Restrictions:  fall risk, IV, bed/chair alarm, shook catheter , supplemental O2 (2L) and telemetry        Discharge Recommendations: SNF  DME needs for discharge: defer to facility       Therapy recommendations for staff:   Assist of 1 (contact guard assist) with use of rolling walker (RW) for all ambulation to/from bathroom    AM-PAC Score: AM-PAC Inpatient Daily Activity Raw Score: 16  Home Health S4 Level: Level 1- Standard       Treatment Time:  10:20-10:50  Treatment number:  2   Total Treatment Time:   30 minutes    History of Present Illness: from Dr Kadeem Roche H&P 6/14/20  \"The patient is W 2 y. o. male with PMH below, presents with SOB, generalized weakness/fatigue, lightheadedness, near syncope, palpitations.  Patient reports that over the last 3 days he has become increasingly weak.  He has had associated lightheadedness and felt like he was going to pass out.  It has also had associated palpitations and shortness of breath with dyspnea on exertion.  He is concerned that he might fall and get injured. Simon Hooks is normally on 2L O2 ON only for COPD but has been requiring during day couple of [facility] since arrival in the ER for borderline oxygen saturations.  He was also found to be in A. fib with RVR.  He denies a history of A. fib.  Lastly, he was found to have a probable left lower lobe pneumonia.  Patient does have a history of COPD.  Of note, he has a history of intracranial hemorrhage approximately 2 years ago with residual left upper extremity weakness.  He denies any new focal neurologic deficit.  He reports that he frequently chokes when he eats/drinks. Simon Hooks has required \"stretching of his throat\" multiple times in the past. Simon Hooks cannot remember the name of his ENT. \"    Subjective:

## 2020-06-18 NOTE — PROGRESS NOTES
Pt in bed, awake, A/O X4. Shift assessment complete, night meds given. No C/O pain at this time. . No other needs expressed. Call light in reach. Bed check in place. Will monitor.   Blue Moreno

## 2020-06-18 NOTE — PROGRESS NOTES
felt  Neurological : grossly normal        Medications:   Scheduled Medications:    ipratropium-albuterol  1 ampule Inhalation Q4H WA    enoxaparin  40 mg Subcutaneous Daily    aspirin  81 mg Oral Daily    atorvastatin  20 mg Oral Nightly    escitalopram  10 mg Oral Daily    finasteride  5 mg Oral Daily    cefTRIAXone (ROCEPHIN) IV  1 g Intravenous Q24H    sodium chloride flush  10 mL Intravenous 2 times per day    budesonide-formoterol  2 puff Inhalation BID    lactulose  10 g Oral QPM    metoprolol tartrate  25 mg Oral BID    therapeutic multivitamin-minerals  1 tablet Oral Daily    polyethylene glycol  17 g Oral Daily    potassium chloride  20 mEq Oral Daily with breakfast    tamsulosin  0.8 mg Oral Daily    traZODone  50 mg Oral Nightly    methylPREDNISolone  40 mg Intravenous Q12H    pantoprazole  40 mg Intravenous Daily     I   sodium chloride 75 mL/hr at 06/18/20 0553     ipratropium, albuterol sulfate HFA, albuterol sulfate HFA, magnesium sulfate, potassium chloride **OR** potassium alternative oral replacement **OR** potassium chloride, sodium chloride flush, acetaminophen **OR** acetaminophen, perflutren lipid microspheres    Lab Data:  Recent Labs     06/16/20  0437 06/17/20  0448 06/18/20  0520   WBC 9.7 8.1 7.2   HGB 11.3* 11.7* 11.3*   HCT 33.5* 35.0* 35.6*   MCV 93.1 93.1 94.1    220 206     Recent Labs     06/16/20  0437 06/17/20  0448 06/18/20  0520   * 129* 132*   K 3.9 4.2 4.4   CL 92* 94* 99   CO2 28 26 26   PHOS 2.2* 2.2* 2.1*   BUN 16 21* 22*   CREATININE <0.5* 0.6* <0.5*     No results for input(s): CKTOTAL, CKMB, CKMBINDEX, TROPONINI in the last 72 hours.     Coagulation:   Lab Results   Component Value Date    INR 1.23 03/09/2018     Cardiac markers:   Lab Results   Component Value Date    TROPONINI <0.01 06/14/2020         Lab Results   Component Value Date    ALT 9 (L) 06/15/2020    AST 20 06/15/2020    ALKPHOS 44 06/15/2020    BILITOT 0.3 06/15/2020 Lab Results   Component Value Date    INR 1.23 (H) 03/09/2018    PROTIME 14.2 (H) 03/09/2018       Radiology  Fluoroscopy modified barium swallow with video   Final Result   Penetration with thins by straw. No evidence of aspiration. Please see separate speech pathology report for full discussion of findings   and recommendations. CT HEAD WO CONTRAST   Final Result   No acute intracranial abnormality. XR CHEST PORTABLE   Final Result   1. New left basilar lung infiltrate which may be related to atelectasis with   associated pleural effusion. Pneumonia cannot be excluded. 2. Mild pulmonary vascular congestion, increased. Cultures:     Blood- NGTD  Urine- NGTD  COVID- negative    ECHO    Summary   Technically difficult examination. Left ventricular systolic function is normal with ejection fraction   estimated at 55-60 %. No regional wall motion abnormalities are noted. Normal left ventricular diastolic filling pressure. The right atrium is mildly dilated. Aortic sclerosis vs mild aortic stenosis. Normal systolic pulmonary artery pressure (SPAP) estimated at 37 mmHg (RA   pressure 8 mmHg). Assessment & Plan:    #  Acute on chronic respiratory failure with hypoxia, likely related to pneumonia with COPD component, supplemental O2 to maintain SPO2 ? 92%, continuous pulse ox. Patient is normally on 2L O2 at home ON only, recently requiring during day. He was requiring 4 L to maintain sats. Weaned as tolerated to home settings. Pulm c/s.   improvng with steroids      #  Possible pneumonia on left lung - CAP - likely gram positive organism  - completed  on rocephin and azithromycin D#5. cx obtained. covid ruled out. pulm consulted. No sepsis. slp eval    #  A. fib with RVR, new onset, rate in 120s-130's in the ER. Started on Cardizem drip with bolus, converted to NSR. off gtt. Resumed oral BB. tsh wnl. Echo as above. Cards consulted.  No AC with hx of ICH

## 2020-06-18 NOTE — PROGRESS NOTES
Speech Language Pathology  Facility/Department: [facility] PCU TELEMETRY  Dysphagia Daily Treatment Note    NAME: Nuria Yun  : 1940  MRN: [id number]    Continue current diet recommendation at this time. Patient Diagnosis(es):   Patient Active Problem List    Diagnosis Date Noted    Paroxysmal atrial fibrillation [facility])      Priority: High    Shortness of breath     [facility] failure with [facility] 2020    Atrial fibrillation with RVR (HCC)     COPD exacerbation (HCC)     Abnormal chest x-ray     Disorder of electrolytes     Elevated troponin     Reactive depression 2019    History of CVA (cerebrovascular accident) 2019    History of lung cancer 2019    Dysphagia 2019    Vertebral compression fracture ([facility] 2019    Colon polyps 04/15/2013    COPD (chronic obstructive pulmonary disease) ([facility] 2010    BPH (benign prostatic hyperplasia) 2010    Hyperlipidemia with target LDL less than 130 2010    Hypertension 2010     Allergies: No Known Allergies    Subjective: Patient reclined in bed. Limited motivation to complete PO trials or exercises this date. Pain: No c/o pain    Current Diet: DIET GENERAL; Dysphagia Soft and Bite-Sized; No Drinking Straw    Diet Tolerance:  Patient tolerating current diet level without signs/symptoms of penetration / aspiration. P.O. Trials: None given this date per patient request  Thin       Nectar / Mildly Thick       Honey / Moderately Thick       Pudding / Extremely Thick       Puree       Solid         Dysphagia Treatment and Impressions:  RN approved entry to room. Patient and RN report no coughing/choking with food/liquids. Patient declined PO trials and stated not feeling up to completing exercises this date. Question motivation since return to thin liquids.  Patient educated on results of MBSS, link between aspiration and pneumonia, and increased risk of aspiration d/t

## 2020-06-18 NOTE — PROGRESS NOTES
cooperative     b. HR < 140 bpm  c. RR < 30 bpm                d. Can demonstrate a 2-3 second inspiratory hold  4. Bronchodilators will be administered via Hand Held Nebulizer [facility]) to patients when ANY of the following criteria are met  a. Incognizant or uncooperative          b. Patients treated with HHN at Home        c. Unable to demonstrate proper use of MDI with spacer     d. RR > 30 bpm   5. Bronchodilators will be delivered via Metered Dose Inhaler (MDI), HHN, Aerogen to intubated patients on mechanical ventilation. 6. Inhalation medication orders will be delivered and/or substituted as outlined below. Aerosolized Medications Ordering and Administration Guidelines:    1. All Medications will be ordered by a physician, and their frequency and/or modality will be adjusted as defined by the patients Respiratory Severity Index (RSI) score. 2. If the patient does not have documented COPD, consider discontinuing anticholinergics when RSI is less than 9.  3. If the bronchospasm worsens (increased RSI), then the bronchodilator frequency can be increased to a maximum of every 4 hours. If greater than every 4 hours is required, the physician will be contacted. 4. If the bronchospasm improves, the frequency of the bronchodilator can be decreased, based on the patient's RSI, but not less than home treatment regimen frequency. 5. Bronchodilator(s) will be discontinued if patient has a RSI less than 9 and has received no scheduled or as needed treatment for 72  Hrs. Patients Ordered on a Mucolytic Agent:    1. Must always be administered with a bronchodilator. 2. Discontinue if patient experiences worsened bronchospasm, or secretions have lessened to the point that the patient is able to clear them with a cough. Anti-inflammatory and Combination Medications:    1.  If the patient lacks prior history of lung disease, is not using inhaled anti-inflammatory medication at home, and lacks wheezing by examination or by history for at least 24 hours, contact physician for possible discontinuation.

## 2020-06-19 VITALS
BODY MASS INDEX: 34.23 KG/M2 | DIASTOLIC BLOOD PRESSURE: 81 MMHG | HEIGHT: 67 IN | WEIGHT: 218.1 LBS | SYSTOLIC BLOOD PRESSURE: 151 MMHG | HEART RATE: 69 BPM | TEMPERATURE: 97.8 F | OXYGEN SATURATION: 92 % | RESPIRATION RATE: 20 BRPM

## 2020-06-19 LAB
ANION GAP SERPL CALCULATED.3IONS-SCNC: 8 MMOL/L (ref 3–16)
BASOPHILS ABSOLUTE: 0 K/UL (ref 0–0.2)
BASOPHILS RELATIVE PERCENT: 0 %
BUN BLDV-MCNC: 18 MG/DL (ref 7–20)
CALCIUM SERPL-MCNC: 8.4 MG/DL (ref 8.3–10.6)
CHLORIDE BLD-SCNC: 96 MMOL/L (ref 99–110)
CO2: 28 MMOL/L (ref 21–32)
CREAT SERPL-MCNC: <0.5 MG/DL (ref 0.8–1.3)
EOSINOPHILS ABSOLUTE: 0 K/UL (ref 0–0.6)
EOSINOPHILS RELATIVE PERCENT: 0 %
GFR AFRICAN AMERICAN: >60
GFR NON-AFRICAN AMERICAN: >60
GLUCOSE BLD-MCNC: 100 MG/DL (ref 70–99)
HCT VFR BLD CALC: 35.8 % (ref 40.5–52.5)
HEMOGLOBIN: 11.9 G/DL (ref 13.5–17.5)
LYMPHOCYTES ABSOLUTE: 0.9 K/UL (ref 1–5.1)
LYMPHOCYTES RELATIVE PERCENT: 8 %
MAGNESIUM: 2.2 MG/DL (ref 1.8–2.4)
MCH RBC QN AUTO: 31 PG (ref 26–34)
MCHC RBC AUTO-ENTMCNC: 33.2 G/DL (ref 31–36)
MCV RBC AUTO: 93.2 FL (ref 80–100)
MONOCYTES ABSOLUTE: 0.1 K/UL (ref 0–1.3)
MONOCYTES RELATIVE PERCENT: 1 %
NEUTROPHILS ABSOLUTE: 9.8 K/UL (ref 1.7–7.7)
NEUTROPHILS RELATIVE PERCENT: 91 %
PDW BLD-RTO: 13.3 % (ref 12.4–15.4)
PHOSPHORUS: 1.8 MG/DL (ref 2.5–4.9)
PLATELET # BLD: 189 K/UL (ref 135–450)
PLATELET SLIDE REVIEW: ADEQUATE
PMV BLD AUTO: 6.9 FL (ref 5–10.5)
POTASSIUM SERPL-SCNC: 3.6 MMOL/L (ref 3.5–5.1)
RBC # BLD: 3.84 M/UL (ref 4.2–5.9)
SLIDE REVIEW: ABNORMAL
SODIUM BLD-SCNC: 132 MMOL/L (ref 136–145)
WBC # BLD: 10.8 K/UL (ref 4–11)

## 2020-06-19 PROCEDURE — 99232 SBSQ HOSP IP/OBS MODERATE 35: CPT | Performed by: INTERNAL MEDICINE

## 2020-06-19 PROCEDURE — 84100 ASSAY OF PHOSPHORUS: CPT

## 2020-06-19 PROCEDURE — C9113 INJ PANTOPRAZOLE SODIUM, VIA: HCPCS | Performed by: INTERNAL MEDICINE

## 2020-06-19 PROCEDURE — 2580000003 HC RX 258: Performed by: INTERNAL MEDICINE

## 2020-06-19 PROCEDURE — 94640 AIRWAY INHALATION TREATMENT: CPT

## 2020-06-19 PROCEDURE — 6370000000 HC RX 637 (ALT 250 FOR IP): Performed by: INTERNAL MEDICINE

## 2020-06-19 PROCEDURE — 6360000002 HC RX W HCPCS: Performed by: INTERNAL MEDICINE

## 2020-06-19 PROCEDURE — 80048 BASIC METABOLIC PNL TOTAL CA: CPT

## 2020-06-19 PROCEDURE — 36415 COLL VENOUS BLD VENIPUNCTURE: CPT

## 2020-06-19 PROCEDURE — 83735 ASSAY OF MAGNESIUM: CPT

## 2020-06-19 PROCEDURE — 99239 HOSP IP/OBS DSCHRG MGMT >30: CPT | Performed by: INTERNAL MEDICINE

## 2020-06-19 PROCEDURE — 85025 COMPLETE CBC W/AUTO DIFF WBC: CPT

## 2020-06-19 PROCEDURE — 94761 N-INVAS EAR/PLS OXIMETRY MLT: CPT

## 2020-06-19 PROCEDURE — 2700000000 HC OXYGEN THERAPY PER DAY

## 2020-06-19 RX ORDER — AMLODIPINE BESYLATE 5 MG/1
5 TABLET ORAL DAILY
Qty: 30 TABLET | Refills: 3
Start: 2020-06-20

## 2020-06-19 RX ORDER — PREDNISONE 10 MG/1
TABLET ORAL
Qty: 30 TABLET | Refills: 0 | Status: ON HOLD
Start: 2020-06-19 | End: 2020-09-09 | Stop reason: HOSPADM

## 2020-06-19 RX ORDER — LISINOPRIL 10 MG/1
10 TABLET ORAL DAILY
Status: DISCONTINUED | OUTPATIENT
Start: 2020-06-19 | End: 2020-06-19 | Stop reason: HOSPADM

## 2020-06-19 RX ADMIN — IPRATROPIUM BROMIDE AND ALBUTEROL SULFATE 1 AMPULE: .5; 3 SOLUTION RESPIRATORY (INHALATION) at 07:30

## 2020-06-19 RX ADMIN — AMLODIPINE BESYLATE 5 MG: 5 TABLET ORAL at 09:27

## 2020-06-19 RX ADMIN — PREDNISONE 40 MG: 20 TABLET ORAL at 09:27

## 2020-06-19 RX ADMIN — ASPIRIN 81 MG 81 MG: 81 TABLET ORAL at 09:27

## 2020-06-19 RX ADMIN — ESCITALOPRAM OXALATE 10 MG: 10 TABLET ORAL at 09:27

## 2020-06-19 RX ADMIN — POTASSIUM CHLORIDE 20 MEQ: 750 TABLET, EXTENDED RELEASE ORAL at 09:26

## 2020-06-19 RX ADMIN — FINASTERIDE 5 MG: 5 TABLET, FILM COATED ORAL at 09:27

## 2020-06-19 RX ADMIN — ENOXAPARIN SODIUM 40 MG: 40 INJECTION SUBCUTANEOUS at 09:26

## 2020-06-19 RX ADMIN — METOPROLOL TARTRATE 25 MG: 25 TABLET, FILM COATED ORAL at 09:27

## 2020-06-19 RX ADMIN — TAMSULOSIN HYDROCHLORIDE 0.8 MG: 0.4 CAPSULE ORAL at 09:27

## 2020-06-19 RX ADMIN — PANTOPRAZOLE SODIUM 40 MG: 40 INJECTION, POWDER, FOR SOLUTION INTRAVENOUS at 09:27

## 2020-06-19 RX ADMIN — LISINOPRIL 10 MG: 10 TABLET ORAL at 12:19

## 2020-06-19 RX ADMIN — Medication 10 ML: at 09:26

## 2020-06-19 RX ADMIN — MULTIPLE VITAMINS W/ MINERALS TAB 1 TABLET: TAB at 09:26

## 2020-06-19 NOTE — CARE COORDINATION
DISCHARGE ORDER  Date/Time 2020 3:02 PM  Completed by: Zeke Denton, Case Management    Patient Name: Susen Goltz    : 1940    Admitting Diagnosis: Acute respiratory failure with hypoxia ([address] [J96.01]    Financial Payer Type : Medicare  Admit order Date and Status:2020 inpt  (verify MD's last order for status of admission/Traditional Medicare 3 day qualifying stay required for SNF)    Noted discharge order. Confirmed discharge plan: Yes  with whom____pt___________  If pt confirmed DC plan does family need to be contacted by CM No  Discharge Plan: Chart reviewed,, met with pt at bedside. Pt remains agreeable to STR @ [facility] and is aware of  time. TC to [facility] @ [facility] and she is aware of  time. Pt is being d/c'd to [facility] today. Pt's O2 sats are 94% on 3L. Discharge orders and Continuity of Care faxed to facility: Yes  Hospital Exemption Notification System complete: Yes  Transportation arranged: Yes - [facility]  Pick-up @ [phone]. Patient / Family (pt and spouse) aware of  time: Yes   Nursing aware of  time: Yes-[facility]  Receiving facility aware of  time: Yes-[facility]  Pre-cert obtained? Medicare    Discharge timeout done with [facility]. All discharge needs and concerns addressed. Discharging nurse to complete LACHELLE, reconcile AVS, and place final copy with patient's discharge packet. Discharging RN to ensure that written prescriptions for  Level II medications are sent with patient to the facility as per protocol.

## 2020-06-19 NOTE — PROGRESS NOTES
Patient educated on discharge instructions as well as new medications use, dosage, administration and possible side effects. Patient verified knowledge. IV removed without difficulty and dry dressing in place. Telemetry monitor removed and returned to [facility]. Pt left facility in stable condition to Rehab with all of their personal belongings. Report given to [employer].

## 2020-06-19 NOTE — PROGRESS NOTES
Pt in bed, eyes closed. No distress noted. Call light in reach. Will continue to monitor.   Antoine Meng

## 2020-06-19 NOTE — DISCHARGE SUMMARY
organism  - completed rocephin D#6 and azithromycin D#5. cx obtained. covid ruled out. pulm consulted. No sepsis. slp eval     #  A. fib with RVR, new onset, rate in 120s-130's in the ER.  Started on Cardizem drip with bolus, converted to NSR.   off gtt. Resumed oral BB. tsh wnl. Echo as below. Cards consulted. No AC with hx of ICH in the past and short duration of Afibb    #Hypertension. BP elevated. Continue Lisinopril, Metoprolol. Added Amlodipine. D/c on this. F/w PCP.      #  Near syncopal episode at home, fall precautions. Echo as below.      # Hypokalemia, 3.2, replaced. Stable.      # Indeterminate troponin, 0.03, telemetry and repeat troponin negative. Possibly related to rate.     # Leukocytosis, 12.9, monitored. Resolved now.      #  History of grade 2 dCHF (see echo above from 2018), added on BNP and it is 299.  Patient actually appears clinically dry at this time so I doubt this is primary etiology.     #  Urinary retention - placed shook by urology.  Keep it for now      DVT Prophylaxis: lovenox    Procedures (Please Review Full Report for Details)  Modified Barium swallow    Consults    Placement of shook catheter      Physical Exam at Discharge:    BP (!) 177/82   Pulse 61   Temp 97.5 °F (36.4 °C) (Oral)   Resp 20   Ht 5' 7\" (1.702 m)   Wt 218 lb 1.6 oz (98.9 kg)   SpO2 94%   BMI 34.16 kg/m²     See today's progress note    CBC:   Recent Labs     06/17/20 0448 06/18/20  0520 06/19/20  0532   WBC 8.1 7.2 10.8   HGB 11.7* 11.3* 11.9*   HCT 35.0* 35.6* 35.8*   MCV 93.1 94.1 93.2    206 189     BMP:   Recent Labs     06/17/20 0448 06/18/20  0520 06/19/20  0532   * 132* 132*   K 4.2 4.4 3.6   CL 94* 99 96*   CO2 26 26 28   PHOS 2.2* 2.1* 1.8*   BUN 21* 22* 18   CREATININE 0.6* <0.5* <0.5*     U/A:    Lab Results   Component Value Date    NITRITE NEG 02/20/2018    COLORU Yellow 06/14/2020    WBCUA 6-9 06/14/2020    RBCUA 3-4 06/14/2020    MUCUS 1+ 06/14/2020    BACTERIA 1+ Breo Ellipta 100-25 MCG/INH Aepb inhaler  Generic drug:  fluticasone-vilanterol  INHALE 1 PUFF DAILY     Compressor/Nebulizer Misc  Nebulizer as directed. Diagnosis: Chronic COPD with acute exacerbation     escitalopram 10 MG tablet  Commonly known as:  LEXAPRO  TAKE (1) TABLET DAILY     finasteride 5 MG tablet  Commonly known as:  PROSCAR  TAKE (1) TABLET DAILY     fluticasone 50 MCG/ACT nasal spray  Commonly known as:  FLONASE  USE 2 SPRAYS INTO EACH NOSTRIL DAILY     hydroCHLOROthiazide 25 MG tablet  Commonly known as:  HYDRODIURIL  TAKE (1) TABLET DAILY     ipratropium-albuterol 0.5-2.5 (3) MG/3ML Soln nebulizer solution  Commonly known as:  DUONEB  Inhale 3 mLs into the lungs every 6 hours as needed for Shortness of Breath     lactulose 10 GM/15ML solution  Commonly known as:  CHRONULAC  Take 15 mLs by mouth every evening     lisinopril 10 MG tablet  Commonly known as:  PRINIVIL;ZESTRIL  TAKE (1) TABLET DAILY     metoprolol tartrate 25 MG tablet  Commonly known as:  LOPRESSOR  TAKE 1 TABLET TWICE DAILY     pantoprazole 40 MG tablet  Commonly known as:  PROTONIX  TAKE (1) TABLET DAILY     polyethylene glycol 17 GM/SCOOP powder  Commonly known as:  GLYCOLAX     potassium chloride 20 MEQ extended release tablet  Commonly known as:  KLOR-CON M  Take 1 tablet by mouth daily (with breakfast)     tamsulosin 0.4 MG capsule  Commonly known as:  FLOMAX  TAKE 2 CAPSULES AT BEDTIME     therapeutic multivitamin-minerals tablet     traZODone 50 MG tablet  Commonly known as:  DESYREL  Take 1 tablet by mouth nightly           Where to Get Your Medications      Information about where to get these medications is not yet available    Ask your nurse or doctor about these medications  · amLODIPine 5 MG tablet  · predniSONE 10 MG tablet           Discharged in stable condition to [facility]. Follow Up: Follow up with PCP.     Galdino Vasques MD 7/13/2020 9:56 PM

## 2020-06-19 NOTE — PROGRESS NOTES
edema or cyanosis. Distal pulses well felt  Neurological : grossly normal        Medications:   Scheduled Medications:    amLODIPine  5 mg Oral Daily    predniSONE  40 mg Oral Daily    ipratropium-albuterol  1 ampule Inhalation Q4H WA    enoxaparin  40 mg Subcutaneous Daily    aspirin  81 mg Oral Daily    atorvastatin  20 mg Oral Nightly    escitalopram  10 mg Oral Daily    finasteride  5 mg Oral Daily    cefTRIAXone (ROCEPHIN) IV  1 g Intravenous Q24H    sodium chloride flush  10 mL Intravenous 2 times per day    lactulose  10 g Oral QPM    metoprolol tartrate  25 mg Oral BID    therapeutic multivitamin-minerals  1 tablet Oral Daily    polyethylene glycol  17 g Oral Daily    potassium chloride  20 mEq Oral Daily with breakfast    tamsulosin  0.8 mg Oral Daily    traZODone  50 mg Oral Nightly    pantoprazole  40 mg Intravenous Daily     I    ipratropium, albuterol sulfate HFA, albuterol sulfate HFA, magnesium sulfate, potassium chloride **OR** potassium alternative oral replacement **OR** potassium chloride, sodium chloride flush, acetaminophen **OR** acetaminophen, perflutren lipid microspheres    Lab Data:  Recent Labs     06/17/20  0448 06/18/20  0520 06/19/20  0532   WBC 8.1 7.2 10.8   HGB 11.7* 11.3* 11.9*   HCT 35.0* 35.6* 35.8*   MCV 93.1 94.1 93.2    206 189     Recent Labs     06/17/20 0448 06/18/20  0520 06/19/20  0532   * 132* 132*   K 4.2 4.4 3.6   CL 94* 99 96*   CO2 26 26 28   PHOS 2.2* 2.1* 1.8*   BUN 21* 22* 18   CREATININE 0.6* <0.5* <0.5*     No results for input(s): CKTOTAL, CKMB, CKMBINDEX, TROPONINI in the last 72 hours.     Coagulation:   Lab Results   Component Value Date    INR 1.23 03/09/2018     Cardiac markers:   Lab Results   Component Value Date    TROPONINI <0.01 06/14/2020         Lab Results   Component Value Date    ALT 9 (L) 06/15/2020    AST 20 06/15/2020    ALKPHOS 44 06/15/2020    BILITOT 0.3 06/15/2020       Lab Results   Component Value Date INR 1.23 (H) 03/09/2018    PROTIME 14.2 (H) 03/09/2018       Radiology  Fluoroscopy modified barium swallow with video   Final Result   Penetration with thins by straw. No evidence of aspiration. Please see separate speech pathology report for full discussion of findings   and recommendations. CT HEAD WO CONTRAST   Final Result   No acute intracranial abnormality. XR CHEST PORTABLE   Final Result   1. New left basilar lung infiltrate which may be related to atelectasis with   associated pleural effusion. Pneumonia cannot be excluded. 2. Mild pulmonary vascular congestion, increased. Cultures:     Blood- NGTD  Urine- NGTD  COVID- negative    ECHO    Summary   Technically difficult examination. Left ventricular systolic function is normal with ejection fraction   estimated at 55-60 %. No regional wall motion abnormalities are noted. Normal left ventricular diastolic filling pressure. The right atrium is mildly dilated. Aortic sclerosis vs mild aortic stenosis. Normal systolic pulmonary artery pressure (SPAP) estimated at 37 mmHg (RA   pressure 8 mmHg). Assessment & Plan:    #  Acute on chronic respiratory failure with hypoxia, likely related to pneumonia with COPD component, supplemental O2 to maintain SPO2 ? 92%, continuous pulse ox. Patient is normally on 2L O2 at home ON only, recently requiring during day. He was requiring 4 L to maintain sats. Weaned as tolerated to home settings. Pulm c/s.   improvng with steroids      #  Possible pneumonia on left lung - CAP - likely gram positive organism  - completed rocephin D#6 and azithromycin D#5. cx obtained. covid ruled out. pulm consulted. No sepsis. slp eval    #  A. fib with RVR, new onset, rate in 120s-130's in the ER. Started on Cardizem drip with bolus, converted to NSR. off gtt. Resumed oral BB. tsh wnl. Echo as above. Cards consulted.  No AC with hx of ICH in the past and short duration of Afibb    #

## 2020-06-20 ENCOUNTER — CARE COORDINATION (OUTPATIENT)
Dept: CASE MANAGEMENT | Age: 80
End: 2020-06-20

## 2020-07-10 PROBLEM — J96.01 ACUTE RESPIRATORY FAILURE WITH HYPOXIA (HCC): Status: RESOLVED | Noted: 2020-06-14 | Resolved: 2020-07-10

## 2020-08-10 ENCOUNTER — TELEPHONE (OUTPATIENT)
Dept: FAMILY MEDICINE CLINIC | Age: 80
End: 2020-08-10

## 2020-08-10 NOTE — TELEPHONE ENCOUNTER
Wife called about patient needing recerted for his oxygen. Patient was not able to keep visit in June or July because he was in the hospital and then is still in nursing home for rehab. Spoke to Dami encarnacion at [facility], and she we could send over notes from May and they can have the billing office look at it to see if that will qualify but he will have to recert as new patient once he gets out of rehab. Wife was notified.

## 2020-08-17 ENCOUNTER — TELEPHONE (OUTPATIENT)
Dept: FAMILY MEDICINE CLINIC | Age: 80
End: 2020-08-17

## 2020-08-17 NOTE — TELEPHONE ENCOUNTER
----- Message from Osmel Roberto sent at 8/17/2020 11:20 AM EDT -----  Subject: Message to Provider    QUESTIONS  Information for Provider? Patient's wife Nila James is asking that pt's oxygen   services from [facility] be cancelled. She states that he is being admitted   in nursing home today. Please call pt's wife to discuss. ---------------------------------------------------------------------------  --------------  [place] [employer] INFO  What is the best way for the office to contact you? OK to leave message on   voicemail  Preferred Call Back Phone Number? [phone]  ---------------------------------------------------------------------------  --------------  SCRIPT ANSWERS  Relationship to Patient? Other  Representative Name? Vandana/ Wife  Is the Representative on the appropriate HIPAA document in Epic?  Yes

## 2020-08-18 ENCOUNTER — CARE COORDINATION (OUTPATIENT)
Dept: CASE MANAGEMENT | Age: 80
End: 2020-08-18

## 2020-09-06 ENCOUNTER — APPOINTMENT (OUTPATIENT)
Dept: GENERAL RADIOLOGY | Age: 80
DRG: 193 | End: 2020-09-06
Payer: MEDICARE

## 2020-09-06 ENCOUNTER — HOSPITAL ENCOUNTER (INPATIENT)
Age: 80
LOS: 3 days | Discharge: NURSING, REHAB OR LONG-TERM CARE | DRG: 193 | End: 2020-09-09
Attending: EMERGENCY MEDICINE | Admitting: INTERNAL MEDICINE
Payer: MEDICARE

## 2020-09-06 PROBLEM — J96.22 ACUTE ON CHRONIC RESPIRATORY FAILURE WITH HYPOXIA AND HYPERCAPNIA (HCC): Status: ACTIVE | Noted: 2020-09-06

## 2020-09-06 PROBLEM — J96.21 ACUTE ON CHRONIC RESPIRATORY FAILURE WITH HYPOXIA AND HYPERCAPNIA (HCC): Status: ACTIVE | Noted: 2020-09-06

## 2020-09-06 LAB
A/G RATIO: 1.3 (ref 1.1–2.2)
ALBUMIN SERPL-MCNC: 3.5 G/DL (ref 3.4–5)
ALP BLD-CCNC: 70 U/L (ref 40–129)
ALT SERPL-CCNC: 8 U/L (ref 10–40)
ANION GAP SERPL CALCULATED.3IONS-SCNC: 5 MMOL/L (ref 3–16)
ANION GAP SERPL CALCULATED.3IONS-SCNC: 6 MMOL/L (ref 3–16)
AST SERPL-CCNC: 17 U/L (ref 15–37)
BASE EXCESS ARTERIAL: 7 MMOL/L (ref -3–3)
BASE EXCESS ARTERIAL: 8.7 MMOL/L (ref -3–3)
BASOPHILS ABSOLUTE: 0 K/UL (ref 0–0.2)
BASOPHILS RELATIVE PERCENT: 0.1 %
BILIRUB SERPL-MCNC: 1.5 MG/DL (ref 0–1)
BUN BLDV-MCNC: 21 MG/DL (ref 7–20)
BUN BLDV-MCNC: 23 MG/DL (ref 7–20)
CALCIUM SERPL-MCNC: 9.1 MG/DL (ref 8.3–10.6)
CALCIUM SERPL-MCNC: 9.2 MG/DL (ref 8.3–10.6)
CARBOXYHEMOGLOBIN ARTERIAL: 0.5 % (ref 0–1.5)
CARBOXYHEMOGLOBIN ARTERIAL: 1.1 % (ref 0–1.5)
CHLORIDE BLD-SCNC: 86 MMOL/L (ref 99–110)
CHLORIDE BLD-SCNC: 87 MMOL/L (ref 99–110)
CO2: 35 MMOL/L (ref 21–32)
CO2: 39 MMOL/L (ref 21–32)
CREAT SERPL-MCNC: 0.6 MG/DL (ref 0.8–1.3)
CREAT SERPL-MCNC: 0.8 MG/DL (ref 0.8–1.3)
EKG ATRIAL RATE: 93 BPM
EKG DIAGNOSIS: NORMAL
EKG P AXIS: 52 DEGREES
EKG P-R INTERVAL: 188 MS
EKG Q-T INTERVAL: 566 MS
EKG QRS DURATION: 76 MS
EKG QTC CALCULATION (BAZETT): 703 MS
EKG R AXIS: 47 DEGREES
EKG T AXIS: 75 DEGREES
EKG VENTRICULAR RATE: 93 BPM
EOSINOPHILS ABSOLUTE: 0 K/UL (ref 0–0.6)
EOSINOPHILS RELATIVE PERCENT: 0.2 %
GFR AFRICAN AMERICAN: >60
GFR AFRICAN AMERICAN: >60
GFR NON-AFRICAN AMERICAN: >60
GFR NON-AFRICAN AMERICAN: >60
GLOBULIN: 2.7 G/DL
GLUCOSE BLD-MCNC: 131 MG/DL (ref 70–99)
GLUCOSE BLD-MCNC: 155 MG/DL (ref 70–99)
HCO3 ARTERIAL: 34.7 MMOL/L (ref 21–29)
HCO3 ARTERIAL: 36.3 MMOL/L (ref 21–29)
HCT VFR BLD CALC: 38.8 % (ref 40.5–52.5)
HEMOGLOBIN, ART, EXTENDED: 13.3 G/DL (ref 13.5–17.5)
HEMOGLOBIN, ART, EXTENDED: 13.5 G/DL (ref 13.5–17.5)
HEMOGLOBIN: 12.8 G/DL (ref 13.5–17.5)
LYMPHOCYTES ABSOLUTE: 0.8 K/UL (ref 1–5.1)
LYMPHOCYTES RELATIVE PERCENT: 6.8 %
MAGNESIUM: 1.5 MG/DL (ref 1.8–2.4)
MCH RBC QN AUTO: 30.2 PG (ref 26–34)
MCHC RBC AUTO-ENTMCNC: 33 G/DL (ref 31–36)
MCV RBC AUTO: 91.4 FL (ref 80–100)
METHEMOGLOBIN ARTERIAL: 0.3 %
METHEMOGLOBIN ARTERIAL: 0.3 %
MONOCYTES ABSOLUTE: 0.5 K/UL (ref 0–1.3)
MONOCYTES RELATIVE PERCENT: 4.6 %
NEUTROPHILS ABSOLUTE: 9.9 K/UL (ref 1.7–7.7)
NEUTROPHILS RELATIVE PERCENT: 88.3 %
O2 CONTENT ARTERIAL: 18 ML/DL
O2 CONTENT ARTERIAL: 19 ML/DL
O2 SAT, ARTERIAL: 93.9 %
O2 SAT, ARTERIAL: 99.3 %
O2 THERAPY: ABNORMAL
O2 THERAPY: ABNORMAL
PCO2 ARTERIAL: 63.8 MMHG (ref 35–45)
PCO2 ARTERIAL: 64.4 MMHG (ref 35–45)
PDW BLD-RTO: 14.2 % (ref 12.4–15.4)
PH ARTERIAL: 7.35 (ref 7.35–7.45)
PH ARTERIAL: 7.37 (ref 7.35–7.45)
PLATELET # BLD: 195 K/UL (ref 135–450)
PMV BLD AUTO: 7 FL (ref 5–10.5)
PO2 ARTERIAL: 200.4 MMHG (ref 75–108)
PO2 ARTERIAL: 73.6 MMHG (ref 75–108)
POTASSIUM SERPL-SCNC: 2.7 MMOL/L (ref 3.5–5.1)
POTASSIUM SERPL-SCNC: 3 MMOL/L (ref 3.5–5.1)
PRO-BNP: 594 PG/ML (ref 0–449)
PROCALCITONIN: 0.05 NG/ML (ref 0–0.15)
RBC # BLD: 4.25 M/UL (ref 4.2–5.9)
SODIUM BLD-SCNC: 128 MMOL/L (ref 136–145)
SODIUM BLD-SCNC: 130 MMOL/L (ref 136–145)
TCO2 ARTERIAL: 36.6 MMOL/L
TCO2 ARTERIAL: 38.3 MMOL/L
TOTAL PROTEIN: 6.2 G/DL (ref 6.4–8.2)
TROPONIN: 0.04 NG/ML
TROPONIN: 0.04 NG/ML
TROPONIN: <0.01 NG/ML
WBC # BLD: 11.2 K/UL (ref 4–11)

## 2020-09-06 PROCEDURE — 6370000000 HC RX 637 (ALT 250 FOR IP): Performed by: INTERNAL MEDICINE

## 2020-09-06 PROCEDURE — 87150 DNA/RNA AMPLIFIED PROBE: CPT

## 2020-09-06 PROCEDURE — 93005 ELECTROCARDIOGRAM TRACING: CPT | Performed by: EMERGENCY MEDICINE

## 2020-09-06 PROCEDURE — 71045 X-RAY EXAM CHEST 1 VIEW: CPT

## 2020-09-06 PROCEDURE — 82803 BLOOD GASES ANY COMBINATION: CPT

## 2020-09-06 PROCEDURE — 2580000003 HC RX 258: Performed by: INTERNAL MEDICINE

## 2020-09-06 PROCEDURE — 96365 THER/PROPH/DIAG IV INF INIT: CPT

## 2020-09-06 PROCEDURE — 36415 COLL VENOUS BLD VENIPUNCTURE: CPT

## 2020-09-06 PROCEDURE — U0003 INFECTIOUS AGENT DETECTION BY NUCLEIC ACID (DNA OR RNA); SEVERE ACUTE RESPIRATORY SYNDROME CORONAVIRUS 2 (SARS-COV-2) (CORONAVIRUS DISEASE [COVID-19]), AMPLIFIED PROBE TECHNIQUE, MAKING USE OF HIGH THROUGHPUT TECHNOLOGIES AS DESCRIBED BY CMS-2020-01-R: HCPCS

## 2020-09-06 PROCEDURE — 6360000002 HC RX W HCPCS: Performed by: INTERNAL MEDICINE

## 2020-09-06 PROCEDURE — 6370000000 HC RX 637 (ALT 250 FOR IP): Performed by: EMERGENCY MEDICINE

## 2020-09-06 PROCEDURE — 84484 ASSAY OF TROPONIN QUANT: CPT

## 2020-09-06 PROCEDURE — 99285 EMERGENCY DEPT VISIT HI MDM: CPT

## 2020-09-06 PROCEDURE — 2000000000 HC ICU R&B

## 2020-09-06 PROCEDURE — U0002 COVID-19 LAB TEST NON-CDC: HCPCS

## 2020-09-06 PROCEDURE — 96375 TX/PRO/DX INJ NEW DRUG ADDON: CPT

## 2020-09-06 PROCEDURE — 87040 BLOOD CULTURE FOR BACTERIA: CPT

## 2020-09-06 PROCEDURE — 80053 COMPREHEN METABOLIC PANEL: CPT

## 2020-09-06 PROCEDURE — 83735 ASSAY OF MAGNESIUM: CPT

## 2020-09-06 PROCEDURE — 83880 ASSAY OF NATRIURETIC PEPTIDE: CPT

## 2020-09-06 PROCEDURE — 2700000000 HC OXYGEN THERAPY PER DAY

## 2020-09-06 PROCEDURE — 99223 1ST HOSP IP/OBS HIGH 75: CPT | Performed by: INTERNAL MEDICINE

## 2020-09-06 PROCEDURE — 94640 AIRWAY INHALATION TREATMENT: CPT

## 2020-09-06 PROCEDURE — 94761 N-INVAS EAR/PLS OXIMETRY MLT: CPT

## 2020-09-06 PROCEDURE — 6360000002 HC RX W HCPCS: Performed by: EMERGENCY MEDICINE

## 2020-09-06 PROCEDURE — 85025 COMPLETE CBC W/AUTO DIFF WBC: CPT

## 2020-09-06 PROCEDURE — 84145 PROCALCITONIN (PCT): CPT

## 2020-09-06 PROCEDURE — 93010 ELECTROCARDIOGRAM REPORT: CPT | Performed by: INTERNAL MEDICINE

## 2020-09-06 RX ORDER — SODIUM CHLORIDE 0.9 % (FLUSH) 0.9 %
10 SYRINGE (ML) INJECTION EVERY 12 HOURS SCHEDULED
Status: DISCONTINUED | OUTPATIENT
Start: 2020-09-06 | End: 2020-09-09 | Stop reason: HOSPADM

## 2020-09-06 RX ORDER — ONDANSETRON 2 MG/ML
4 INJECTION INTRAMUSCULAR; INTRAVENOUS EVERY 6 HOURS PRN
Status: DISCONTINUED | OUTPATIENT
Start: 2020-09-06 | End: 2020-09-09 | Stop reason: HOSPADM

## 2020-09-06 RX ORDER — ESCITALOPRAM OXALATE 10 MG/1
10 TABLET ORAL DAILY
Status: DISCONTINUED | OUTPATIENT
Start: 2020-09-06 | End: 2020-09-09 | Stop reason: HOSPADM

## 2020-09-06 RX ORDER — MAGNESIUM SULFATE IN WATER 40 MG/ML
2 INJECTION, SOLUTION INTRAVENOUS ONCE
Status: COMPLETED | OUTPATIENT
Start: 2020-09-06 | End: 2020-09-06

## 2020-09-06 RX ORDER — FINASTERIDE 5 MG/1
5 TABLET, FILM COATED ORAL DAILY
Status: DISCONTINUED | OUTPATIENT
Start: 2020-09-06 | End: 2020-09-09 | Stop reason: HOSPADM

## 2020-09-06 RX ORDER — SODIUM CHLORIDE 9 MG/ML
INJECTION, SOLUTION INTRAVENOUS CONTINUOUS
Status: DISCONTINUED | OUTPATIENT
Start: 2020-09-06 | End: 2020-09-07

## 2020-09-06 RX ORDER — METHYLPREDNISOLONE SODIUM SUCCINATE 40 MG/ML
40 INJECTION, POWDER, LYOPHILIZED, FOR SOLUTION INTRAMUSCULAR; INTRAVENOUS EVERY 12 HOURS
Status: DISCONTINUED | OUTPATIENT
Start: 2020-09-06 | End: 2020-09-07

## 2020-09-06 RX ORDER — AMLODIPINE BESYLATE 5 MG/1
5 TABLET ORAL DAILY
Status: DISCONTINUED | OUTPATIENT
Start: 2020-09-06 | End: 2020-09-09 | Stop reason: HOSPADM

## 2020-09-06 RX ORDER — POLYETHYLENE GLYCOL 3350 17 G/17G
17 POWDER, FOR SOLUTION ORAL DAILY PRN
Status: DISCONTINUED | OUTPATIENT
Start: 2020-09-06 | End: 2020-09-09 | Stop reason: HOSPADM

## 2020-09-06 RX ORDER — PROMETHAZINE HYDROCHLORIDE 25 MG/1
12.5 TABLET ORAL EVERY 6 HOURS PRN
Status: DISCONTINUED | OUTPATIENT
Start: 2020-09-06 | End: 2020-09-09 | Stop reason: HOSPADM

## 2020-09-06 RX ORDER — LEVOFLOXACIN 5 MG/ML
500 INJECTION, SOLUTION INTRAVENOUS EVERY 24 HOURS
Status: DISCONTINUED | OUTPATIENT
Start: 2020-09-06 | End: 2020-09-09 | Stop reason: HOSPADM

## 2020-09-06 RX ORDER — PANTOPRAZOLE SODIUM 40 MG/1
40 TABLET, DELAYED RELEASE ORAL
Status: DISCONTINUED | OUTPATIENT
Start: 2020-09-06 | End: 2020-09-09 | Stop reason: HOSPADM

## 2020-09-06 RX ORDER — ACETAMINOPHEN 650 MG/1
650 SUPPOSITORY RECTAL EVERY 6 HOURS PRN
Status: DISCONTINUED | OUTPATIENT
Start: 2020-09-06 | End: 2020-09-09 | Stop reason: HOSPADM

## 2020-09-06 RX ORDER — TAMSULOSIN HYDROCHLORIDE 0.4 MG/1
0.4 CAPSULE ORAL DAILY
Status: DISCONTINUED | OUTPATIENT
Start: 2020-09-06 | End: 2020-09-09 | Stop reason: HOSPADM

## 2020-09-06 RX ORDER — POTASSIUM CHLORIDE 20 MEQ/1
40 TABLET, EXTENDED RELEASE ORAL PRN
Status: DISCONTINUED | OUTPATIENT
Start: 2020-09-06 | End: 2020-09-09 | Stop reason: HOSPADM

## 2020-09-06 RX ORDER — METOPROLOL TARTRATE 50 MG/1
25 TABLET, FILM COATED ORAL 2 TIMES DAILY
Status: DISCONTINUED | OUTPATIENT
Start: 2020-09-06 | End: 2020-09-09 | Stop reason: HOSPADM

## 2020-09-06 RX ORDER — LISINOPRIL 10 MG/1
10 TABLET ORAL DAILY
Status: DISCONTINUED | OUTPATIENT
Start: 2020-09-06 | End: 2020-09-09 | Stop reason: HOSPADM

## 2020-09-06 RX ORDER — SODIUM CHLORIDE 0.9 % (FLUSH) 0.9 %
10 SYRINGE (ML) INJECTION PRN
Status: DISCONTINUED | OUTPATIENT
Start: 2020-09-06 | End: 2020-09-09 | Stop reason: HOSPADM

## 2020-09-06 RX ORDER — POTASSIUM CHLORIDE 7.45 MG/ML
10 INJECTION INTRAVENOUS PRN
Status: DISCONTINUED | OUTPATIENT
Start: 2020-09-06 | End: 2020-09-09 | Stop reason: HOSPADM

## 2020-09-06 RX ORDER — ALBUTEROL SULFATE 90 UG/1
2 AEROSOL, METERED RESPIRATORY (INHALATION)
Status: DISCONTINUED | OUTPATIENT
Start: 2020-09-06 | End: 2020-09-07

## 2020-09-06 RX ORDER — ASPIRIN 81 MG/1
81 TABLET, CHEWABLE ORAL DAILY
Status: DISCONTINUED | OUTPATIENT
Start: 2020-09-06 | End: 2020-09-09 | Stop reason: HOSPADM

## 2020-09-06 RX ORDER — LACTULOSE 10 G/15ML
10 SOLUTION ORAL EVERY EVENING
Status: DISCONTINUED | OUTPATIENT
Start: 2020-09-06 | End: 2020-09-09 | Stop reason: HOSPADM

## 2020-09-06 RX ORDER — IPRATROPIUM BROMIDE AND ALBUTEROL SULFATE 2.5; .5 MG/3ML; MG/3ML
1 SOLUTION RESPIRATORY (INHALATION) ONCE
Status: COMPLETED | OUTPATIENT
Start: 2020-09-06 | End: 2020-09-06

## 2020-09-06 RX ORDER — ALBUTEROL SULFATE 90 UG/1
2 AEROSOL, METERED RESPIRATORY (INHALATION) EVERY 4 HOURS PRN
Status: DISCONTINUED | OUTPATIENT
Start: 2020-09-06 | End: 2020-09-09 | Stop reason: HOSPADM

## 2020-09-06 RX ORDER — METHYLPREDNISOLONE SODIUM SUCCINATE 125 MG/2ML
125 INJECTION, POWDER, LYOPHILIZED, FOR SOLUTION INTRAMUSCULAR; INTRAVENOUS ONCE
Status: COMPLETED | OUTPATIENT
Start: 2020-09-06 | End: 2020-09-06

## 2020-09-06 RX ORDER — ATORVASTATIN CALCIUM 10 MG/1
20 TABLET, FILM COATED ORAL DAILY
Status: DISCONTINUED | OUTPATIENT
Start: 2020-09-06 | End: 2020-09-09 | Stop reason: HOSPADM

## 2020-09-06 RX ORDER — MAGNESIUM SULFATE 1 G/100ML
1 INJECTION INTRAVENOUS PRN
Status: DISCONTINUED | OUTPATIENT
Start: 2020-09-06 | End: 2020-09-09 | Stop reason: HOSPADM

## 2020-09-06 RX ORDER — ACETAMINOPHEN 325 MG/1
650 TABLET ORAL EVERY 6 HOURS PRN
Status: DISCONTINUED | OUTPATIENT
Start: 2020-09-06 | End: 2020-09-09 | Stop reason: HOSPADM

## 2020-09-06 RX ORDER — TRAZODONE HYDROCHLORIDE 50 MG/1
50 TABLET ORAL NIGHTLY
Status: DISCONTINUED | OUTPATIENT
Start: 2020-09-06 | End: 2020-09-09 | Stop reason: HOSPADM

## 2020-09-06 RX ORDER — MAGNESIUM SULFATE HEPTAHYDRATE 500 MG/ML
2 INJECTION, SOLUTION INTRAMUSCULAR; INTRAVENOUS ONCE
Status: DISCONTINUED | OUTPATIENT
Start: 2020-09-06 | End: 2020-09-06 | Stop reason: CLARIF

## 2020-09-06 RX ADMIN — POTASSIUM CHLORIDE 10 MEQ: 7.46 INJECTION, SOLUTION INTRAVENOUS at 21:37

## 2020-09-06 RX ADMIN — Medication 2 PUFF: at 09:01

## 2020-09-06 RX ADMIN — Medication 2 PUFF: at 23:24

## 2020-09-06 RX ADMIN — IPRATROPIUM BROMIDE AND ALBUTEROL SULFATE 1 AMPULE: .5; 3 SOLUTION RESPIRATORY (INHALATION) at 02:08

## 2020-09-06 RX ADMIN — ESCITALOPRAM OXALATE 10 MG: 10 TABLET ORAL at 08:47

## 2020-09-06 RX ADMIN — Medication 2 PUFF: at 19:46

## 2020-09-06 RX ADMIN — Medication 2 PUFF: at 15:06

## 2020-09-06 RX ADMIN — Medication 2 PUFF: at 10:36

## 2020-09-06 RX ADMIN — METHYLPREDNISOLONE SODIUM SUCCINATE 40 MG: 40 INJECTION, POWDER, FOR SOLUTION INTRAMUSCULAR; INTRAVENOUS at 20:01

## 2020-09-06 RX ADMIN — ASPIRIN 81 MG CHEWABLE TABLET 81 MG: 81 TABLET CHEWABLE at 08:46

## 2020-09-06 RX ADMIN — TAMSULOSIN HYDROCHLORIDE 0.4 MG: 0.4 CAPSULE ORAL at 08:47

## 2020-09-06 RX ADMIN — SODIUM CHLORIDE: 9 INJECTION, SOLUTION INTRAVENOUS at 18:38

## 2020-09-06 RX ADMIN — POTASSIUM CHLORIDE 10 MEQ: 7.46 INJECTION, SOLUTION INTRAVENOUS at 20:04

## 2020-09-06 RX ADMIN — TRAZODONE HYDROCHLORIDE 50 MG: 50 TABLET ORAL at 19:59

## 2020-09-06 RX ADMIN — ENOXAPARIN SODIUM 40 MG: 40 INJECTION SUBCUTANEOUS at 08:47

## 2020-09-06 RX ADMIN — Medication 2 PUFF: at 09:02

## 2020-09-06 RX ADMIN — Medication 2 PUFF: at 10:37

## 2020-09-06 RX ADMIN — PANTOPRAZOLE SODIUM 40 MG: 40 TABLET, DELAYED RELEASE ORAL at 09:01

## 2020-09-06 RX ADMIN — Medication 10 ML: at 20:04

## 2020-09-06 RX ADMIN — METHYLPREDNISOLONE SODIUM SUCCINATE 125 MG: 125 INJECTION, POWDER, FOR SOLUTION INTRAMUSCULAR; INTRAVENOUS at 02:40

## 2020-09-06 RX ADMIN — DEXTROSE MONOHYDRATE 500 MG: 50 INJECTION, SOLUTION INTRAVENOUS at 08:47

## 2020-09-06 RX ADMIN — METOPROLOL TARTRATE 25 MG: 50 TABLET, FILM COATED ORAL at 20:00

## 2020-09-06 RX ADMIN — LACTULOSE 10 G: 10 SOLUTION ORAL at 18:30

## 2020-09-06 RX ADMIN — Medication 10 ML: at 08:48

## 2020-09-06 RX ADMIN — SODIUM CHLORIDE: 9 INJECTION, SOLUTION INTRAVENOUS at 07:30

## 2020-09-06 RX ADMIN — LEVOFLOXACIN 500 MG: 5 INJECTION, SOLUTION INTRAVENOUS at 20:01

## 2020-09-06 RX ADMIN — MAGNESIUM SULFATE IN WATER 2 G: 40 INJECTION, SOLUTION INTRAVENOUS at 04:29

## 2020-09-06 RX ADMIN — FINASTERIDE 5 MG: 5 TABLET, FILM COATED ORAL at 10:36

## 2020-09-06 ASSESSMENT — PAIN SCALES - GENERAL: PAINLEVEL_OUTOF10: 0

## 2020-09-06 NOTE — ED PROVIDER NOTES
[address] ED  eMERGENCY dEPARTMENT eNCOUnter      Pt Name: Teetee Benoit  MRN: [id number]  [place] 1940  Date of evaluation: 9/6/2020  Provider: Pa Stanley MD  PCP: SONAL Carpio - [facility]      CHIEF COMPLAINT       Chief Complaint   Patient presents with    Respiratory Distress     nursing home patient with sudden onset of respiratory distress       HISTORY OFPRESENT ILLNESS   (Location/Symptom, Timing/Onset, Context/Setting, Quality, Duration, Modifying Factors,Severity)  Note limiting factors. Teetee Benoit is a 70 y.o. male presents with complaints of shortness of breath he has a history of COPD and has had episodes of acute respiratory distress he has not complained of any chest pain. He denies any nausea vomiting. .    Nursing Notes were all reviewed and agreed with or any disagreements were addressed  in the HPI. REVIEW OF SYSTEMS    (2-9 systems for level 4, 10 or more for level 5)     Review of Systems    Positives and Pertinent negatives as per HPI. Except as noted above in the ROS, all other systems were reviewed andnegative.        PASTMEDICAL HISTORY     Past Medical History:   Diagnosis Date    Acute respiratory failure with hypoxia ([address] 6/14/2020    Atrial fibrillation with RVR (HCC)     BPH     Bronchitis chronic     Cerebral artery occlusion with cerebral infarction (HCC)     Disorder of electrolytes     Elevated troponin     Emphysema of lung (HCC)     Hyperlipidemia     Hypertension     Lung cancer ([address]     Pneumonia     Shortness of breath     T2 vertebral fracture ([address]          SURGICAL HISTORY       Past Surgical History:   Procedure Laterality Date    ANKLE FRACTURE SURGERY      COLONOSCOPY  05/07/2013    Diverticulosis    LUNG REMOVAL, PARTIAL  May 2009    resection of lung cancer         CURRENT MEDICATIONS       Previous Medications    ACETAMINOPHEN (TYLENOL) 325 MG TABLET    Take 2 tablets by mouth every 4 hours as needed for Fever (Fever >100.5 (38 C), pain)    ALBUTEROL SULFATE  (90 BASE) MCG/ACT INHALER    INHALE 2 PUFFS EVERY 6 HOURS AS NEEDED    AMLODIPINE (NORVASC) 5 MG TABLET    Take 1 tablet by mouth daily    ASPIRIN 81 MG TABLET    Take 81 mg by mouth daily    ATORVASTATIN (LIPITOR) 20 MG TABLET    TAKE (1) TABLET DAILY    ESCITALOPRAM (LEXAPRO) 10 MG TABLET    TAKE (1) TABLET DAILY    FINASTERIDE (PROSCAR) 5 MG TABLET    TAKE (1) TABLET DAILY    FLUTICASONE (FLONASE) 50 MCG/ACT NASAL SPRAY    USE 2 SPRAYS INTO EACH NOSTRIL DAILY    FLUTICASONE-VILANTEROL (BREO ELLIPTA) 100-25 MCG/INH AEPB INHALER    INHALE 1 PUFF DAILY    HYDROCHLOROTHIAZIDE (HYDRODIURIL) 25 MG TABLET    TAKE (1) TABLET DAILY    IPRATROPIUM-ALBUTEROL (DUONEB) 0.5-2.5 (3) MG/3ML SOLN NEBULIZER SOLUTION    Inhale 3 mLs into the lungs every 6 hours as needed for Shortness of Breath    LACTULOSE (CHRONULAC) 10 GM/15ML SOLUTION    Take 15 mLs by mouth every evening    LISINOPRIL (PRINIVIL;ZESTRIL) 10 MG TABLET    TAKE (1) TABLET DAILY    METOPROLOL TARTRATE (LOPRESSOR) 25 MG TABLET    TAKE 1 TABLET TWICE DAILY    MULTIPLE VITAMINS-MINERALS (THERAPEUTIC MULTIVITAMIN-MINERALS) TABLET    Take 1 tablet by mouth daily    NEBULIZERS (COMPRESSOR/NEBULIZER) MISC    Nebulizer as directed. Diagnosis: Chronic COPD with acute exacerbation    PANTOPRAZOLE (PROTONIX) 40 MG TABLET    TAKE (1) TABLET DAILY    POLYETHYLENE GLYCOL (GLYCOLAX) POWDER    Take 17 g by mouth daily    POTASSIUM CHLORIDE (KLOR-CON M) 20 MEQ EXTENDED RELEASE TABLET    Take 1 tablet by mouth daily (with breakfast)    PREDNISONE (DELTASONE) 10 MG TABLET    40 mg x 2 days, 30 mg x 3 days, 20 mg x 3 days, 10 mg x 3 days then stop    TAMSULOSIN (FLOMAX) 0.4 MG CAPSULE    TAKE 2 CAPSULES AT BEDTIME    TRAZODONE (DESYREL) 50 MG TABLET    Take 1 tablet by mouth nightly       ALLERGIES     Patient has no known allergies.     FAMILY HISTORY       Family History   Problem Relation Age of Onset  Emphysema Mother     Cancer Brother         lung cancer    Asthma Neg Hx     Heart Failure Neg Hx           SOCIAL HISTORY       Social History     Socioeconomic History    Marital status:      Spouse name: None    Number of children: None    Years of education: None    Highest education level: None   Occupational History    None   Social Needs    Financial resource strain: None    Food insecurity     Worry: None     Inability: None    Transportation needs     Medical: None     Non-medical: None   Tobacco Use    Smoking status: Former Smoker     Packs/day: 1.00     Years: 52.00     Pack years: 52.00     Types: Cigarettes     Last attempt to quit: 2000     Years since quittin.3    Smokeless tobacco: Never Used   Substance and Sexual Activity    Alcohol use: Not Currently     Alcohol/week: 0.0 standard drinks     Comment: occasionally    Drug use: No    Sexual activity: Not Currently     Partners: Female   Lifestyle    Physical activity     Days per week: None     Minutes per session: None    Stress: None   Relationships    Social connections     Talks on phone: None     Gets together: None     Attends Hinduism service: None     Active member of club or organization: None     Attends meetings of clubs or organizations: None     Relationship status: None    Intimate partner violence     Fear of current or ex partner: None     Emotionally abused: None     Physically abused: None     Forced sexual activity: None   Other Topics Concern    None   Social History Narrative    None       SCREENINGS      @FLOW([id number])@      PHYSICAL EXAM    (up to 7 for level 4, 8 or more for level 5)     ED Triage Vitals [20 0152]   BP Temp Temp Source Pulse Resp SpO2 Height Weight   125/68 99.1 °F (37.3 °C) Oral 93 23 100 % 5' 7\" (1.702 m) 220 lb (99.8 kg)       Physical Exam      General Appearance:  Alert, cooperative, no distress, appears stated age.    Head:  Normocephalic, without obviousabnormality, atraumatic. Eyes:  conjunctiva/corneas clear, EOM's intact. Sclera anicteric. ENT: Mucous membranes moist.   Neck: Supple, symmetrical, trachea midline, no adenopathy. No jugular venous distention. Lungs: In bilateral   Chest Wall:  No tenderness. Heart:  Regular rate and rhythm, S1 and S2 normal, no murmur, rub or gallop. Abdomen:   Soft, non-tender, bowel sounds active,   no masses, no organomegaly. Extremities: No edema, cords or calf tenderness. Full range of motion. Pulses: 2+ and symmetric   Skin: Turgor is normal, no rashes or lesions. Neurologic: Alert and oriented X 3. No focal findings.   Motor grossly normal.  Speech clear, no drift, CN III-XII grossly intact,        DIAGNOSTIC RESULTS   LABS:    Labs Reviewed   CBC WITH AUTO DIFFERENTIAL - Abnormal; Notable for the following components:       Result Value    WBC 11.2 (*)     Hemoglobin 12.8 (*)     Hematocrit 38.8 (*)     Neutrophils Absolute 9.9 (*)     Lymphocytes Absolute 0.8 (*)     All other components within normal limits    Narrative:     Performed at:  [place] [address],  [facility]   Phone [phone]   COMPREHENSIVE METABOLIC PANEL - Abnormal; Notable for the following components:    Sodium 130 (*)     Potassium 3.0 (*)     Chloride 86 (*)     CO2 39 (*)     Glucose 131 (*)     BUN 21 (*)     Total Protein 6.2 (*)     Total Bilirubin 1.5 (*)     ALT 8 (*)     All other components within normal limits    Narrative:     Performed at:  [facility],  [facility]   Phone [phone]   TROPONIN - Abnormal; Notable for the following components:    Troponin 0.04 (*)     All other components within normal limits    Narrative:     Performed at:  [place] [address],  [facility]   Phone [phone]   BRAIN NATRIURETIC PEPTIDE - Abnormal; Physician who eithersigns or Co-signs this chart in the absence of a cardiologist.    The Ekg interpreted by me in the absence of a cardiologist shows. Normal Sinus rhythm   Rate of  93   Axis is   Normal  QTc is  within an acceptable range  Intervals and Durations are unremarkable. Nonspecific ST-T wave changes appreciated. No evidence of acute ischemia. RADIOLOGY:   Non-plain film images such as CT, Ultrasound and MRI are read by the radiologist. Plain radiographic images are visualized by myself. *    Interpretation per the Radiologist below, if available at the time of this note:    XR CHEST PORTABLE   Final Result   Generalized interstitial prominence without superimposed focal airspace   consolidation, sizeable pleural effusion, or pneumothorax.                PROCEDURES   Unless otherwise noted below, none     Procedures    *    CRITICAL CARE TIME   N/A      EMERGENCY DEPARTMENT COURSE and DIFFERENTIALDIAGNOSIS/MDM:   Vitals:    Vitals:    09/06/20 0242 09/06/20 0317 09/06/20 0404 09/06/20 0449   BP: (!) 135/105 112/63 (!) 103/55 112/64   Pulse: 96 94 90 87   Resp: 20 25 25 21   Temp:       TempSrc:       SpO2: 93% 98% 91% 91%   Weight:       Height:           Patient was given thefollowing medications:  Medications   magnesium sulfate 1 g in dextrose 5% 100 mL IVPB (has no administration in time range)   potassium chloride (KLOR-CON M) extended release tablet 40 mEq (has no administration in time range)     Or   potassium bicarb-citric acid (EFFER-K) effervescent tablet 40 mEq (has no administration in time range)     Or   potassium chloride 10 mEq/100 mL IVPB (Peripheral Line) (has no administration in time range)   magnesium sulfate 2 g in 50 mL IVPB premix (2 g Intravenous New Bag 9/6/20 4719)   ipratropium-albuterol (DUONEB) nebulizer solution 1 ampule (1 ampule Inhalation Given 9/6/20 0208)   ipratropium-albuterol (DUONEB) nebulizer solution 1 ampule (1 ampule Inhalation Given 9/6/20 0208)   methylPREDNISolone sodium (SOLU-MEDROL) injection 125 mg (125 mg Intravenous Given 9/6/20 0240)           The patient tolerated their visit well. The patient and / or the familywere informed of the results of any tests, a time was given to answer questions. FINAL IMPRESSION      1. Chronic obstructive pulmonary disease with acute exacerbation (HCC)    2. Elevated troponin          DISPOSITION/PLAN   DISPOSITION Admitted 09/06/2020 04:55:52 AM  Patient was treated with albuterol aerosols initial blood gas showed a normal pH with a PCO2 of around 60 repeat showed about the same the patient did have an episode of desaturation after the first aerosol when he was laying flat I have discussed the case with the hospitalist the patient will be admitted his troponin was initially elevated it was repeated and was no change.     PATIENT REFERRED TO:  SONAL Muniz - [facility]  [address] [place]  [phone]            DISCHARGE MEDICATIONS:  New Prescriptions    No medications on file       DISCONTINUED MEDICATIONS:  Discontinued Medications    No medications on file              (Please note that portions of this note were completed with a voice recognition program.  Efforts were made to edit the dictations but occasionally words are mis-transcribed.)    France Pacheco MD (electronically signed)      France Pacheco MD  09/06/20 6909

## 2020-09-06 NOTE — ED NOTES
Readjusted pt in bed and provided with warm blanket. Coughed upon request. Pt returned to sleeping at this time. Will continue to monitor.       Fany Neely RN  09/06/20 9222

## 2020-09-06 NOTE — PROGRESS NOTES
4 Eyes Skin Assessment     The patient is being assess for   Admission    I agree that 2 RN's have performed a thorough Head to Toe Skin Assessment on the patient. ALL assessment sites listed below have been assessed. Areas assessed by both nurses:   [x]   Head, Face, and Ears   [x]   Shoulders, Back, and Chest, Abdomen  [x]   Arms, Elbows, and Hands   [x]   Coccyx, Sacrum, and Ischium  [x]   Legs, Feet, and Heels        Buttocks red    **SHARE this note so that the co-signing nurse is able to place an eSignature**    Co-signer eSignature: Electronically signed by Clrae Talley RN on 9/6/20 at 11:15 PM EDT    Does the Patient have Skin Breakdown?   No          Christopher Prevention initiated:  No   Wound Care Orders initiated:  No      WOC nurse consulted for Pressure Injury (Stage 3,4, Unstageable, DTI, NWPT, Complex wounds)and New or Established Ostomies:  No      Primary Nurse eSignature: Electronically signed by Maria oLpez RN on 9/6/20 at 7:42 PM EDT

## 2020-09-06 NOTE — ED NOTES
0800 - called consult for pulmonary   Pt is a HOLD in [address] [place] [address]  09/06/20 6238

## 2020-09-06 NOTE — PROGRESS NOTES
RESPIRATORY THERAPY ASSESSMENT    Name:  Anna Luna Record Number:  [id number]  Age: 70 y.o. Gender: male  : 1940  Today's Date:  2020  Room:  3013/3013-01    Assessment     Is the patient being admitted for a COPD or Asthma exacerbation? No   (If yes the patient will be seen every 4 hours for the first 24 hours and then reassessed)    Patient Admission Diagnosis      Allergies  No Known Allergies    Minimum Predicted Vital Capacity:                Actual Vital Capacity:                     Pulmonary History:COPD  Home Oxygen Therapy:  2 liters/min via nasal cannula prn  Home Respiratory Therapy:Albuterol, Albuterol/Ipratropium Bromide HHN and Vilanterol/Fluticasone Furoate   Current Respiratory Therapy:  Albuterol atrovent q 4 w/a  Treatment Type: HHN  Medications: Albuterol/Ipratropium    Respiratory Severity Index(RSI)   Patients with orders for inhalation medications, oxygen, or any therapeutic treatment modality will be placed on Respiratory Protocol. They will be assessed with the first treatment and at least every 72 hours thereafter. The following severity scale will be used to determine frequency of treatment intervention.     Smoking History: Pulmonary Disease or Smoking History, Greater than 15 pack year = 2    Social History  Social History     Tobacco Use    Smoking status: Former Smoker     Packs/day: 1.00     Years: 52.00     Pack years: 52.00     Types: Cigarettes     Last attempt to quit: 2000     Years since quittin.3    Smokeless tobacco: Never Used   Substance Use Topics    Alcohol use: Not Currently     Alcohol/week: 0.0 standard drinks     Comment: occasionally    Drug use: No       Recent Surgical History: None = 0  Past Surgical History  Past Surgical History:   Procedure Laterality Date    ANKLE FRACTURE SURGERY      COLONOSCOPY  2013    Diverticulosis    LUNG REMOVAL, PARTIAL  May 2009    resection of lung cancer       Level of Consciousness: Alert, Follows Commands but Disoriented = 1    Level of Activity: Walking with assistance = 1    Respiratory Pattern: Regular Pattern; RR 8-20 = 0    Breath Sounds: Absent bilaterally and/or with wheezes = 3    Sputum   ,  ,    Cough: Weak, productive = 2    Vital Signs   BP (!) 120/59   Pulse 65   Temp 97.7 °F (36.5 °C) (Oral)   Resp 16   Ht 5' 7\" (1.702 m)   Wt 220 lb (99.8 kg)   SpO2 93%   BMI 34.46 kg/m²   SPO2 (COPD values may differ): 86-87% on room air or greater than 92% on FiO2 35- 50% = 3    Peak Flow (asthma only): not applicable = 0    RSI: 12-05 = Q6H or QID and Q4HPRN for dyspnea        Plan       Goals: medication delivery, mobilize retained secretions, volume expansion and improve oxygenation    Patient/caregiver was educated on the proper method of use for Respiratory Care Devices:  Yes      Level of patient/caregiver understanding able to:   ? Verbalize understanding   ? Demonstrate understanding       ? Teach back        ? Needs reinforcement       ? No available caregiver               ? Other:     Response to education:  Jesus Peterson     Is patient being placed on Home Treatment Regimen? No     Does the patient have everything they need prior to discharge? NA     Comments: chart reviewed and patient assessed    Plan of Care: as ordered     Electronically signed by Maxim Garvey RCP on 9/6/2020 at 5:46 PM    Respiratory Protocol Guidelines     1. Assessment and treatment by Respiratory Therapy will be initiated for medication and therapeutic interventions upon initiation of aerosolized medication. 2. Physician will be contacted for respiratory rate (RR) greater than 35 breaths per minute. Therapy will be held for heart rate (HR) greater than 140 beats per minute, pending direction from physician. 3. Bronchodilators will be administered via Metered Dose Inhaler (MDI) with spacer when the following criteria are met:  a.  Alert and cooperative     b. HR < 140 bpm  c. RR < 30 bpm d. Can demonstrate a 2-3 second inspiratory hold  4. Bronchodilators will be administered via Hand Held Nebulizer [facility] to patients when ANY of the following criteria are met  a. Incognizant or uncooperative          b. Patients treated with HHN at Home        c. Unable to demonstrate proper use of MDI with spacer     d. RR > 30 bpm   5. Bronchodilators will be delivered via Metered Dose Inhaler (MDI), HHN, Aerogen to intubated patients on mechanical ventilation. 6. Inhalation medication orders will be delivered and/or substituted as outlined below. Aerosolized Medications Ordering and Administration Guidelines:    1. All Medications will be ordered by a physician, and their frequency and/or modality will be adjusted as defined by the patients Respiratory Severity Index (RSI) score. 2. If the patient does not have documented COPD, consider discontinuing anticholinergics when RSI is less than 9.  3. If the bronchospasm worsens (increased RSI), then the bronchodilator frequency can be increased to a maximum of every 4 hours. If greater than every 4 hours is required, the physician will be contacted. 4. If the bronchospasm improves, the frequency of the bronchodilator can be decreased, based on the patient's RSI, but not less than home treatment regimen frequency. 5. Bronchodilator(s) will be discontinued if patient has a RSI less than 9 and has received no scheduled or as needed treatment for 72  Hrs. Patients Ordered on a Mucolytic Agent:    1. Must always be administered with a bronchodilator. 2. Discontinue if patient experiences worsened bronchospasm, or secretions have lessened to the point that the patient is able to clear them with a cough. Anti-inflammatory and Combination Medications:    1.  If the patient lacks prior history of lung disease, is not using inhaled anti-inflammatory medication at home, and lacks wheezing by examination or by history for at least 24 hours, contact physician for possible discontinuation.

## 2020-09-06 NOTE — PLAN OF CARE
70 yoM w/ SOB, resp distress. He is on 3L at home and was on NRB at time of admit. Acute on chronic resp fail w/ hypoxia and hypercapnia  aeCOPD  Hypo Mg  Hypo K    Admitted to ICU 2/2 resp distress per ED.

## 2020-09-06 NOTE — ED NOTES
Admission questions answered by [facility] nurse Cristina Quivers from [facility].       Audelia Feldman RN  09/06/20 9020

## 2020-09-06 NOTE — CONSULTS
AC    tamsulosin  0.4 mg Oral Daily    traZODone  50 mg Oral Nightly    sodium chloride flush  10 mL Intravenous 2 times per day    enoxaparin  40 mg Subcutaneous Daily    azithromycin  500 mg Intravenous Q24H     Continuous Infusions:   sodium chloride 75 mL/hr at 09/06/20 0730     PRN Meds:  magnesium sulfate, potassium chloride **OR** potassium alternative oral replacement **OR** potassium chloride, albuterol sulfate HFA, sodium chloride flush, acetaminophen **OR** acetaminophen, polyethylene glycol, promethazine **OR** ondansetron    ALLERGIES:  Patient has No Known Allergies. REVIEW OF SYSTEMS:  Constitutional: Negative for fever  HENT: Negative for sore throat  Eyes: Negative for redness   Respiratory: +  dyspnea, cough  Cardiovascular: Negative for chest pain  Gastrointestinal: Negative for vomiting, diarrhea   Genitourinary: Negative for hematuria   Musculoskeletal: Negative for arthralgias   Skin: Negative for rash  Neurological: Negative for syncope  Hematological: Negative for adenopathy  Psychiatric/Behavorial: Negative for anxiety    PHYSICAL EXAM:  Blood pressure (!) 108/55, pulse 81, temperature 99.1 °F (37.3 °C), temperature source Oral, resp. rate 18, height 5' 7\" (1.702 m), weight 220 lb (99.8 kg), SpO2 94 %.' on RA  Gen: + distress. Ill-appearing  Eyes: PERRL. No sclera icterus. No conjunctival injection. ENT: No discharge. Pharynx clear. Neck: Trachea midline. No obvious mass. Resp: + accessory muscle use. No crackles. Bilateral wheezes. Few rhonchi. No dullness on percussion. CV: Regular rate. Regular rhythm. No murmur or rub. No edema. GI: Non-tender. Non-distended. No hernia. Skin: Warm and dry. No nodule on exposed extremities. Lymph: No cervical LAD. No supraclavicular LAD. M/S: No cyanosis. No joint deformity. No clubbing. Neuro: Lethargic. Moves all four extremities. Psych: Oriented x 3. No anxiety.      LABS:  CBC:   Recent Labs     09/06/20  0147   WBC 11.2* HGB 12.8*   HCT 38.8*   MCV 91.4        BMP:   Recent Labs     09/06/20  0147   *   K 3.0*   CL 86*   CO2 39*   BUN 21*   CREATININE 0.8     LIVER PROFILE:   Recent Labs     09/06/20  0147   AST 17   ALT 8*   BILITOT 1.5*   ALKPHOS 70     PT/INR: No results for input(s): PROTIME, INR in the last 72 hours. APTT: No results for input(s): APTT in the last 72 hours. UA:No results for input(s): NITRITE, COLORU, PHUR, LABCAST, WBCUA, RBCUA, MUCUS, TRICHOMONAS, YEAST, BACTERIA, CLARITYU, SPECGRAV, LEUKOCYTESUR, UROBILINOGEN, BILIRUBINUR, BLOODU, GLUCOSEU, AMORPHOUS in the last 72 hours.     Invalid input(s): KETONESU  Recent Labs     09/06/20  0200 09/06/20  0305   PHART 7.369 7.353   [id number] 64.4* 63.8*   PO2ART 73.6* 200.4*       Chest x-ray 9/6 imaging was reviewed by me and showed   Bibasilar airspace disease    ASSESSMENT:  · Acute hypoxemic respiratory failure  · Severe COPD with acute exacerbation  · Tracheobronchitis with possible lower lobe pneumonia  · CVA with residual left-sided weakness  · Electrolytes disorder  · Elevated troponin-EKG no significant changes from prior    PLAN:  Supplemental oxygen to maintain SaO2 >92%; wean as tolerated  Vapotherm if needed  Avoid BiPAP until COVID is ruled out  Droplet plus isolation (surgical mask, eye protection, gown, glove)  Emergent Covid 19 testing  IV antibiotics to include Levaquin for now  Blood culture and sputum culture  Urine legionella and streptococcal pneumonia antige  Nasopharyngeal swab for influenza A&B  Moved to negative pressure room in case needs aerosolized procedure  Avoid NEBs as able-albuterol MDI strongly preferred   IV Solu-Medrol 40 every 12  Electrolytes replacement  DVT prophylaxis: Lovenox  MRSA prophylaxis: Bactroban

## 2020-09-06 NOTE — H&P
Hospital Medicine History & Physical      PCP: SONAL Alexis CNP    Date of Admission: 9/6/2020    Date of Service: Pt seen/examined on 9/6/20    Chief Complaint: sob      History Of Present Illness:   70 y.o. male who past medical history significant for severe COPD history of atrial fibrillation and history of stroke with residual left-sided weakness, walks with a walker from [facility]?   previous history of lung cancer   presented to ER with sob sudden I\onse\   pt appeared to  Be bitconfused  He thinks he  Is being dced  On 2lit o2 at home   No fever   No chest pain    No covid exposure  o2 sat were down  To  80   Pt on o2 admitted toICU    Past Medical History:          Diagnosis Date    Acute respiratory failure with hypoxia ([address] 6/14/2020    Atrial fibrillation with RVR ([facility]     BPH     Bronchitis chronic     Cerebral artery occlusion with cerebral infarction ([address]     Disorder of electrolytes     Elevated troponin     Emphysema of lung (HCC)     Hyperlipidemia     Hypertension     Lung cancer ([facility]     Pneumonia     Shortness of breath     T2 vertebral fracture ([facility]        Past Surgical History:          Procedure Laterality Date    ANKLE FRACTURE SURGERY      COLONOSCOPY  05/07/2013    Diverticulosis    LUNG REMOVAL, PARTIAL  May 2009    resection of lung cancer       Medications Prior to Admission:      Prior to Admission medications    Medication Sig Start Date End Date Taking?  Authorizing Provider   amLODIPine (NORVASC) 5 MG tablet Take 1 tablet by mouth daily 6/20/20  Yes Aleah Thomas MD   atorvastatin (LIPITOR) 20 MG tablet TAKE (1) TABLET DAILY 5/21/20  Yes SONAL Alexis CNP   metoprolol tartrate (LOPRESSOR) 25 MG tablet TAKE 1 TABLET TWICE DAILY 5/21/20  Yes SONAL Alexis CNP   hydroCHLOROthiazide (HYDRODIURIL) 25 MG tablet TAKE (1) TABLET DAILY 5/14/20  Yes SONAL Alexis CNP   Nebulizers (COMPRESSOR/NEBULIZER) MISC Nebulizer as directed.  Diagnosis: Chronic COPD with acute exacerbation 5/11/20  Yes SONAL Rosas CNP   ipratropium-albuterol (DUONEB) 0.5-2.5 (3) MG/3ML SOLN nebulizer solution Inhale 3 mLs into the lungs every 6 hours as needed for Shortness of Breath 5/11/20 9/6/20 Yes SONAL Rosas CNP   albuterol sulfate  (90 Base) MCG/ACT inhaler INHALE 2 PUFFS EVERY 6 HOURS AS NEEDED 5/4/20  Yes Nilton Fleming MD   tamsulosin (FLOMAX) 0.4 MG capsule TAKE 2 CAPSULES AT BEDTIME 4/10/20  Yes SONAL Rosas CNP   fluticasone-vilanterol (BREO ELLIPTA) 100-25 MCG/INH AEPB inhaler INHALE 1 PUFF DAILY 3/17/20  Yes Nilton Fleming MD   lisinopril (PRINIVIL;ZESTRIL) 10 MG tablet TAKE (1) TABLET DAILY 3/12/20  Yes SONAL Rosas CNP   escitalopram (LEXAPRO) 10 MG tablet TAKE (1) TABLET DAILY 2/20/20  Yes SONAL Rosas CNP   finasteride (PROSCAR) 5 MG tablet TAKE (1) TABLET DAILY 2/20/20  Yes SONAL Rosas CNP   traZODone (DESYREL) 50 MG tablet Take 1 tablet by mouth nightly 9/24/19  Yes SONAL Rosas CNP   Multiple Vitamins-Minerals (THERAPEUTIC MULTIVITAMIN-MINERALS) tablet Take 1 tablet by mouth daily   Yes Historical Provider, MD   pantoprazole (PROTONIX) 40 MG tablet TAKE (1) TABLET DAILY 1/4/19  Yes Radha Mccord MD   aspirin 81 MG tablet Take 81 mg by mouth daily   Yes Historical Provider, MD   polyethylene glycol (GLYCOLAX) powder Take 17 g by mouth daily   Yes Historical Provider, MD   acetaminophen (TYLENOL) 325 MG tablet Take 2 tablets by mouth every 4 hours as needed for Fever (Fever >100.5 (38 C), pain) 4/3/18  Yes Antonio Lima MD   potassium chloride (KLOR-CON M) 20 MEQ extended release tablet Take 1 tablet by mouth daily (with breakfast) 4/4/18  Yes Antonio Lima MD   lactulose (CHRONULAC) 10 GM/15ML solution Take 15 mLs by mouth every evening 2/19/18  Yes Radha Mccord MD   predniSONE (DELTASONE) 10 MG tablet 40 mg x 2 days, 30 mg x 3 days, 20 mg x 3 days, 10 mg x 3 days then stop 6/19/20   Jeffrey Simmonds, MD   fluticasone Jarad Ash) 50 MCG/ACT nasal spray USE 2 SPRAYS INTO EACH NOSTRIL DAILY 9/24/19   Clem Leyden, APRN - CNP       Allergies:  Patient has no known allergies. Social History:      The patient currently lives at home    TOBACCO:   reports that he quit smoking about 20 years ago. His smoking use included cigarettes. He has a 52.00 pack-year smoking history. He has never used smokeless tobacco.  ETOH:   reports previous alcohol use. E-Cigarettes Vaping or Juuling     Questions Responses    Vaping Use Never User    Start Date     Does device contain nicotine? Never    Quit Date     Vaping Type            Reviewed in detail and negative for DM, CAD, Cancer, CVA. Positive as follows:        Problem Relation Age of Onset    Emphysema Mother     Cancer Brother         lung cancer    Asthma Neg Hx     Heart Failure Neg Hx        REVIEW OF SYSTEMS:   Pertinent positives as noted in the HPI. All other systems reviewed and negative. PHYSICAL EXAM PERFORMED:    BP (!) 103/58   Pulse 59   Temp 97.6 °F (36.4 °C) (Oral)   Resp 19   Ht 5' 7\" (1.702 m)   Wt 220 lb (99.8 kg)   SpO2 91%   BMI 34.46 kg/m²     General appearance: Mod resp distress, appears stated age and cooperative. HEENT:  Normal cephalic, atraumatic without obvious deformity. Pupils equal, round, and reactive to light. Extra ocular muscles intact. Conjunctivae/corneas clear. Neck: Supple, with full range of motion. No jugular venous distention. Trachea midline. Respiratory:  Decreased AE anterior congestion. Cardiovascular:  Regular rate and rhythm with normal S1/S2 without murmurs, rubs or gallops. Abdomen: Soft, non-tender, non-distended with normal bowel sounds. Musculoskeletal:  No clubbing, cyanosis or edema bilaterally. Full range of motion without deformity.   Skin: Skin color, texture, turgor normal.  No rashes or lesions. Neurologic:  Neurovascularly intact without any focal sensory/motor deficits. Cranial nerves: II-XII intact, grossly non-focal.  Psychiatric:  Alert and oriented, thought content appropriate, normal insight  Capillary Refill: Brisk,< 3 seconds   Peripheral Pulses: +2 palpable, equal bilaterally       Labs:     Recent Labs     09/06/20 0147   WBC 11.2*   HGB 12.8*   HCT 38.8*        Recent Labs     09/06/20 0147   *   K 3.0*   CL 86*   CO2 39*   BUN 21*   CREATININE 0.8   CALCIUM 9.1     Recent Labs     09/06/20  0147   AST 17   ALT 8*   BILITOT 1.5*   ALKPHOS 70     No results for input(s): INR in the last 72 hours. Recent Labs     09/06/20 0147 09/06/20  0410   TROPONINI 0.04* 0.04*       Radiology:     XR CHEST PORTABLE   Final Result   Generalized interstitial prominence without superimposed focal airspace   consolidation, sizeable pleural effusion, or pneumothorax. ASSESSMENT:    Active Hospital Problems    Diagnosis Date Noted    Acute on chronic respiratory failure with hypoxia and hypercapnia (HCC) [Q86.90, J96.22] 09/06/2020     1. Acute on chronic reps failure with probable COPD component,  Steroid atb,  o2   HHN  covid ruleout      2. Hx PAF   3. NSR  On ekg    4. Hypokalemia,repalce    5. Indeterminate troponin, 0.04,  related to sob,tachy   . 6. History of  dCHF stable   Continue  Home meds ACE/bblocker   Hold norvasc prn   7 history of CVA with left-sided residual weakness  8 previous history of lung cancer with resection several years ago by history  9 dementia  PLAN:        DVT Prophylaxis: lovonex  Diet: Diet NPO Effective Now Exceptions are: Ice Chips, Sips with Meds  Code Status: Full Code      Dispo -icu       Severo Galli, MD    Thank you SONAL Lincoln CNP for the opportunity to be involved in this patient's care. If you have any questions or concerns please feel free to contact me at [phone].

## 2020-09-06 NOTE — ED NOTES
Patients oxygen dropped to 83% on 4L per NC. Patient placed on NRB, sats increased to 96%. Dr. Sung Overton at bedside.       Orin Maynard RN  09/06/20 [id number]

## 2020-09-06 NOTE — ED NOTES
Pt alert and verbal, am medications given. Pt unable to void for urine specimen, refusing to be straight cathed. COVID swab obtained. Will continue to monitor. [address], RN  09/06/20 8310

## 2020-09-07 LAB
A/G RATIO: 1.3 (ref 1.1–2.2)
ALBUMIN SERPL-MCNC: 3.3 G/DL (ref 3.4–5)
ALP BLD-CCNC: 59 U/L (ref 40–129)
ALT SERPL-CCNC: 8 U/L (ref 10–40)
ANION GAP SERPL CALCULATED.3IONS-SCNC: 3 MMOL/L (ref 3–16)
AST SERPL-CCNC: 13 U/L (ref 15–37)
BASOPHILS ABSOLUTE: 0 K/UL (ref 0–0.2)
BASOPHILS RELATIVE PERCENT: 0 %
BILIRUB SERPL-MCNC: 0.5 MG/DL (ref 0–1)
BILIRUBIN URINE: NEGATIVE
BLOOD, URINE: NEGATIVE
BUN BLDV-MCNC: 19 MG/DL (ref 7–20)
CALCIUM SERPL-MCNC: 9.3 MG/DL (ref 8.3–10.6)
CHLORIDE BLD-SCNC: 90 MMOL/L (ref 99–110)
CLARITY: CLEAR
CO2: 40 MMOL/L (ref 21–32)
COLOR: YELLOW
CREAT SERPL-MCNC: 0.6 MG/DL (ref 0.8–1.3)
EOSINOPHILS ABSOLUTE: 0 K/UL (ref 0–0.6)
EOSINOPHILS RELATIVE PERCENT: 0 %
GFR AFRICAN AMERICAN: >60
GFR NON-AFRICAN AMERICAN: >60
GLOBULIN: 2.5 G/DL
GLUCOSE BLD-MCNC: 139 MG/DL (ref 70–99)
GLUCOSE URINE: NEGATIVE MG/DL
HCT VFR BLD CALC: 35.5 % (ref 40.5–52.5)
HEMOGLOBIN: 11.9 G/DL (ref 13.5–17.5)
KETONES, URINE: NEGATIVE MG/DL
LEUKOCYTE ESTERASE, URINE: NEGATIVE
LYMPHOCYTES ABSOLUTE: 0.5 K/UL (ref 1–5.1)
LYMPHOCYTES RELATIVE PERCENT: 5.4 %
MAGNESIUM: 2.1 MG/DL (ref 1.8–2.4)
MCH RBC QN AUTO: 30.4 PG (ref 26–34)
MCHC RBC AUTO-ENTMCNC: 33.7 G/DL (ref 31–36)
MCV RBC AUTO: 90.2 FL (ref 80–100)
MICROSCOPIC EXAMINATION: NORMAL
MONOCYTES ABSOLUTE: 0.3 K/UL (ref 0–1.3)
MONOCYTES RELATIVE PERCENT: 3.3 %
NEUTROPHILS ABSOLUTE: 8.9 K/UL (ref 1.7–7.7)
NEUTROPHILS RELATIVE PERCENT: 91.3 %
NITRITE, URINE: NEGATIVE
PDW BLD-RTO: 14 % (ref 12.4–15.4)
PH UA: 7.5 (ref 5–8)
PLATELET # BLD: 198 K/UL (ref 135–450)
PMV BLD AUTO: 7.5 FL (ref 5–10.5)
POTASSIUM REFLEX MAGNESIUM: 3.1 MMOL/L (ref 3.5–5.1)
PROTEIN UA: NEGATIVE MG/DL
RAPID INFLUENZA  B AGN: NEGATIVE
RAPID INFLUENZA A AGN: NEGATIVE
RBC # BLD: 3.93 M/UL (ref 4.2–5.9)
S PYO AG THROAT QL: NEGATIVE
SARS-COV-2, PCR: NOT DETECTED
SODIUM BLD-SCNC: 133 MMOL/L (ref 136–145)
SPECIFIC GRAVITY UA: 1.01 (ref 1–1.03)
TOTAL PROTEIN: 5.8 G/DL (ref 6.4–8.2)
TROPONIN: <0.01 NG/ML
URINE REFLEX TO CULTURE: NORMAL
URINE TYPE: NORMAL
UROBILINOGEN, URINE: 1 E.U./DL
WBC # BLD: 9.8 K/UL (ref 4–11)

## 2020-09-07 PROCEDURE — 87081 CULTURE SCREEN ONLY: CPT

## 2020-09-07 PROCEDURE — 94761 N-INVAS EAR/PLS OXIMETRY MLT: CPT

## 2020-09-07 PROCEDURE — 36415 COLL VENOUS BLD VENIPUNCTURE: CPT

## 2020-09-07 PROCEDURE — 80053 COMPREHEN METABOLIC PANEL: CPT

## 2020-09-07 PROCEDURE — 87880 STREP A ASSAY W/OPTIC: CPT

## 2020-09-07 PROCEDURE — 6360000002 HC RX W HCPCS: Performed by: INTERNAL MEDICINE

## 2020-09-07 PROCEDURE — 84484 ASSAY OF TROPONIN QUANT: CPT

## 2020-09-07 PROCEDURE — 83735 ASSAY OF MAGNESIUM: CPT

## 2020-09-07 PROCEDURE — 99233 SBSQ HOSP IP/OBS HIGH 50: CPT | Performed by: INTERNAL MEDICINE

## 2020-09-07 PROCEDURE — 2700000000 HC OXYGEN THERAPY PER DAY

## 2020-09-07 PROCEDURE — 6370000000 HC RX 637 (ALT 250 FOR IP): Performed by: INTERNAL MEDICINE

## 2020-09-07 PROCEDURE — 85025 COMPLETE CBC W/AUTO DIFF WBC: CPT

## 2020-09-07 PROCEDURE — 2580000003 HC RX 258: Performed by: INTERNAL MEDICINE

## 2020-09-07 PROCEDURE — 94640 AIRWAY INHALATION TREATMENT: CPT

## 2020-09-07 PROCEDURE — 1200000000 HC SEMI PRIVATE

## 2020-09-07 PROCEDURE — 2580000003 HC RX 258

## 2020-09-07 PROCEDURE — 87804 INFLUENZA ASSAY W/OPTIC: CPT

## 2020-09-07 PROCEDURE — 81003 URINALYSIS AUTO W/O SCOPE: CPT

## 2020-09-07 RX ORDER — SODIUM CHLORIDE 9 MG/ML
INJECTION, SOLUTION INTRAVENOUS
Status: COMPLETED
Start: 2020-09-07 | End: 2020-09-07

## 2020-09-07 RX ORDER — ALBUTEROL SULFATE 2.5 MG/3ML
2.5 SOLUTION RESPIRATORY (INHALATION) 3 TIMES DAILY
Status: DISCONTINUED | OUTPATIENT
Start: 2020-09-07 | End: 2020-09-09 | Stop reason: HOSPADM

## 2020-09-07 RX ORDER — ALBUTEROL SULFATE 90 UG/1
2 AEROSOL, METERED RESPIRATORY (INHALATION) EVERY 4 HOURS PRN
Status: DISCONTINUED | OUTPATIENT
Start: 2020-09-07 | End: 2020-09-07

## 2020-09-07 RX ORDER — PREDNISONE 20 MG/1
40 TABLET ORAL DAILY
Status: DISCONTINUED | OUTPATIENT
Start: 2020-09-07 | End: 2020-09-09 | Stop reason: HOSPADM

## 2020-09-07 RX ADMIN — ATORVASTATIN CALCIUM 20 MG: 10 TABLET, FILM COATED ORAL at 09:23

## 2020-09-07 RX ADMIN — PREDNISONE 40 MG: 20 TABLET ORAL at 18:40

## 2020-09-07 RX ADMIN — Medication 2 PUFF: at 07:57

## 2020-09-07 RX ADMIN — SODIUM CHLORIDE 250 ML: 9 INJECTION, SOLUTION INTRAVENOUS at 20:27

## 2020-09-07 RX ADMIN — METHYLPREDNISOLONE SODIUM SUCCINATE 40 MG: 40 INJECTION, POWDER, FOR SOLUTION INTRAMUSCULAR; INTRAVENOUS at 09:23

## 2020-09-07 RX ADMIN — AMLODIPINE BESYLATE 5 MG: 5 TABLET ORAL at 09:22

## 2020-09-07 RX ADMIN — LISINOPRIL 10 MG: 10 TABLET ORAL at 09:23

## 2020-09-07 RX ADMIN — Medication 2 PUFF: at 12:00

## 2020-09-07 RX ADMIN — Medication 2 PUFF: at 07:58

## 2020-09-07 RX ADMIN — Medication 2 PUFF: at 15:52

## 2020-09-07 RX ADMIN — ESCITALOPRAM OXALATE 10 MG: 10 TABLET ORAL at 09:22

## 2020-09-07 RX ADMIN — FINASTERIDE 5 MG: 5 TABLET, FILM COATED ORAL at 09:22

## 2020-09-07 RX ADMIN — Medication 10 ML: at 20:28

## 2020-09-07 RX ADMIN — ENOXAPARIN SODIUM 40 MG: 40 INJECTION SUBCUTANEOUS at 09:21

## 2020-09-07 RX ADMIN — TRAZODONE HYDROCHLORIDE 50 MG: 50 TABLET ORAL at 20:27

## 2020-09-07 RX ADMIN — POTASSIUM BICARBONATE 40 MEQ: 782 TABLET, EFFERVESCENT ORAL at 14:08

## 2020-09-07 RX ADMIN — LACTULOSE 10 G: 10 SOLUTION ORAL at 18:40

## 2020-09-07 RX ADMIN — ALBUTEROL SULFATE 2.5 MG: 2.5 SOLUTION RESPIRATORY (INHALATION) at 18:48

## 2020-09-07 RX ADMIN — TAMSULOSIN HYDROCHLORIDE 0.4 MG: 0.4 CAPSULE ORAL at 09:22

## 2020-09-07 RX ADMIN — LEVOFLOXACIN 500 MG: 5 INJECTION, SOLUTION INTRAVENOUS at 20:27

## 2020-09-07 RX ADMIN — Medication 10 ML: at 09:22

## 2020-09-07 RX ADMIN — POTASSIUM BICARBONATE 40 MEQ: 782 TABLET, EFFERVESCENT ORAL at 00:16

## 2020-09-07 RX ADMIN — ASPIRIN 81 MG CHEWABLE TABLET 81 MG: 81 TABLET CHEWABLE at 09:22

## 2020-09-07 RX ADMIN — METOPROLOL TARTRATE 25 MG: 50 TABLET, FILM COATED ORAL at 09:22

## 2020-09-07 RX ADMIN — METOPROLOL TARTRATE 25 MG: 50 TABLET, FILM COATED ORAL at 20:27

## 2020-09-07 ASSESSMENT — PAIN SCALES - GENERAL: PAINLEVEL_OUTOF10: 0

## 2020-09-07 NOTE — PROGRESS NOTES
Hospitalist Progress Note      PCP: SONAL Wright - CNP    Date of Admission: 9/6/2020      Hospital Course: Pleasant 53-year-old gentleman admitted  Admitted with sudden onset of respiratory distress reviewing previous records it appears patient had a history of stroke with residual left-sided weakness and also some cognitive decline he has had history of some aspiration episodes in the past  Oxygen saturation was low at 80% yesterday  Subjective:   Is admitted to intensive care unit ruled out COVID-19 he was treated with steroid and antibiotic is feeling better today      Medications:  Reviewed    Infusion Medications   Scheduled Medications    predniSONE  40 mg Oral Daily    albuterol sulfate HFA  2 puff Inhalation Q4H While awake    And    ipratropium  2 puff Inhalation Q4H While awake    amLODIPine  5 mg Oral Daily    aspirin  81 mg Oral Daily    atorvastatin  20 mg Oral Daily    escitalopram  10 mg Oral Daily    finasteride  5 mg Oral Daily    lactulose  10 g Oral QPM    metoprolol tartrate  25 mg Oral BID    lisinopril  10 mg Oral Daily    pantoprazole  40 mg Oral QAM AC    tamsulosin  0.4 mg Oral Daily    traZODone  50 mg Oral Nightly    sodium chloride flush  10 mL Intravenous 2 times per day    enoxaparin  40 mg Subcutaneous Daily    levofloxacin  500 mg Intravenous Q24H     PRN Meds: magnesium sulfate, potassium chloride **OR** potassium alternative oral replacement **OR** potassium chloride, albuterol sulfate HFA, sodium chloride flush, acetaminophen **OR** acetaminophen, polyethylene glycol, promethazine **OR** ondansetron      Intake/Output Summary (Last 24 hours) at 9/7/2020 1452  Last data filed at 9/7/2020 0055  Gross per 24 hour   Intake --   Output 675 ml   Net -675 ml       Physical Exam Performed:    /62   Pulse 52   Temp 97.6 °F (36.4 °C) (Oral)   Resp 13   Ht 5' 7\" (1.702 m)   Wt 220 lb (99.8 kg)   SpO2 97%   BMI 34.46 kg/m²     General cannot rule out an aspiration episode will ask speech therapist to evaluate again  oxygen wean down to baseline  Continue antibiotic  Aspiration precaution  Physical therapy  Can move out of ICU    History of paroxysmal atrial fibrillation currently normal sinus rhythm    hxOf diastolic CHF appears stable continue home medication    Past medical history of CVA intracranial hemorrhage with residual left-sided weakness  Patient lives in extended-care facility    Dementia is apparent    Previous history of lung cancer with resection several years ago        DVT Prophylaxis:   Diet: DIET GENERAL;  Code Status: Full Code    PT/OT Eval Status: p    Dispo -to MedSurg expected   discharge soon  Eben Weston MD

## 2020-09-07 NOTE — PROGRESS NOTES
Shift Summary    0920 Shift assessment completed with documentation to follow. Remains confused at times. No hostility, impulsiveness noted this morning since \"sun has come up. \" Very pleasant and cooperative. 5839 Nasal flu swab and buccal strept swab obtained without difficulty and sent to lab. [place] 5 with Dr. Luh Weeks and RT. Events overnight and labs reviewed. See orders. 200 Dr. Cheng White here. Exam completed. Agrees with plan to transfer to .  1408 NS infusion stopped per order. 1700 Ate 50% of dinner and tolerated. Ready for transfer to . 1940 Report to Venus Rivera, HANK 9X. Transport called to take to .

## 2020-09-07 NOTE — PROGRESS NOTES
RESPIRATORY THERAPY ASSESSMENT    Name:  Anna Luna Record Number:  [id number]  Age: 70 y.o. Gender: male  : 1940  Today's Date:  2020  Room:  3013/3013-01    Assessment     Is the patient being admitted for a COPD or Asthma exacerbation? No   (If yes the patient will be seen every 4 hours for the first 24 hours and then reassessed)    Patient Admission Diagnosis      Allergies  No Known Allergies    Minimum Predicted Vital Capacity:               Actual Vital Capacity:                    Pulmonary History:COPD  Home Oxygen Therapy:   none  Home Respiratory Therapy: duoneb Q6 PRN   Current Respiratory Therapy:  Albuterol and Atrovent MDI Q4WA  Treatment Type: MDI  Medications: Ipratropium Bromide    Respiratory Severity Index(RSI)   Patients with orders for inhalation medications, oxygen, or any therapeutic treatment modality will be placed on Respiratory Protocol. They will be assessed with the first treatment and at least every 72 hours thereafter. The following severity scale will be used to determine frequency of treatment intervention.     Smoking History: Mild Exacerbation = 3    Social History  Social History     Tobacco Use    Smoking status: Former Smoker     Packs/day: 1.00     Years: 52.00     Pack years: 52.00     Types: Cigarettes     Last attempt to quit: 2000     Years since quittin.3    Smokeless tobacco: Never Used   Substance Use Topics    Alcohol use: Not Currently     Alcohol/week: 0.0 standard drinks     Comment: occasionally    Drug use: No       Recent Surgical History: None = 0  Past Surgical History  Past Surgical History:   Procedure Laterality Date    ANKLE FRACTURE SURGERY      COLONOSCOPY  2013    Diverticulosis    LUNG REMOVAL, PARTIAL  May 2009    resection of lung cancer       Level of Consciousness: Alert, Oriented, and Cooperative = 0    Level of Activity: Walking with assistance = 1    Respiratory Pattern: Increased; RR 21-30 = 1    Breath Sounds: Diminshed bilaterally and/or crackles = 2    Sputum   ,  ,    Cough: Strong, spontaneous, non-productive = 0    Vital Signs   /62   Pulse 52   Temp 97.6 °F (36.4 °C) (Oral)   Resp 13   Ht 5' 7\" (1.702 m)   Wt 220 lb (99.8 kg)   SpO2 97%   BMI 34.46 kg/m²   SPO2 (COPD values may differ): 90-91% on room air or greater than 92% on FiO2 24- 28% = 1    Peak Flow (asthma only): not applicable = 0    RSI: 7-8 = BID and Q4HPRN (every four hours as needed) for dyspnea        Plan       Goals: medication delivery, mobilize retained secretions, volume expansion and improve oxygenation    Patient/caregiver was educated on the proper method of use for Respiratory Care Devices:  Yes      Level of patient/caregiver understanding able to:   ? Verbalize understanding   ? Demonstrate understanding       ? Teach back        ? Needs reinforcement       ? No available caregiver               ? Other:     Response to education:  Very Good     Is patient being placed on Home Treatment Regimen? NA     Does the patient have everything they need prior to discharge? NA     Comments: chart reviewed and pt assesed    Plan of Care: pt to go on TID regiment    Electronically signed by Kt Parrish RCP on 9/7/2020 at 4:47 PM    Respiratory Protocol Guidelines     1. Assessment and treatment by Respiratory Therapy will be initiated for medication and therapeutic interventions upon initiation of aerosolized medication. 2. Physician will be contacted for respiratory rate (RR) greater than 35 breaths per minute. Therapy will be held for heart rate (HR) greater than 140 beats per minute, pending direction from physician. 3. Bronchodilators will be administered via Metered Dose Inhaler (MDI) with spacer when the following criteria are met:  a. Alert and cooperative     b. HR < 140 bpm  c. RR < 30 bpm                d. Can demonstrate a 2-3 second inspiratory hold  4.  Bronchodilators will be administered via Hand

## 2020-09-07 NOTE — PROGRESS NOTES
Shift Summary    1652 Admitted to ICU 13 from ED via stretcher. Moved into ICU bed. Chlorhexadine wipes bath by Sebastian Bailey, HANK and [facility], RN. IV's started left and right forearms by [facility], [address] Urinated via urinal. See I/O.  3831 IV fluid started left forearm vein per order. 1940 Shift report and care hand off to Denise Schuster [facility].

## 2020-09-07 NOTE — PROGRESS NOTES
Pulmonary & Critical Care Medicine ICU Progress Note    CC: COPD exacerbation    Events of Last 24 hours:   2LPM   NS 75 cc/hr   Feels better       Invasive Lines: IV: PIVs          / / /   Recent Labs     20  0200 20  0305   PHART 7.369 7.353   SYE4ODX 64.4* 63.8*   PO2ART 73.6* 200.4*       IV:   sodium chloride 75 mL/hr at 20 1838       Vitals:  Blood pressure (!) 87/41, pulse 52, temperature 97.9 °F (36.6 °C), temperature source Oral, resp. rate 16, height 5' 7\" (1.702 m), weight 220 lb (99.8 kg), SpO2 99 %. on RA  Temp  Av °F (36.7 °C)  Min: 97.6 °F (36.4 °C)  Max: 99.1 °F (37.3 °C)    Intake/Output Summary (Last 24 hours) at 2020 0742  Last data filed at 2020 0055  Gross per 24 hour   Intake --   Output 675 ml   Net -675 ml     EXAM:  Gen: No distress. Ill-appearing  Eyes: PERRL. No sclera icterus. No conjunctival injection. ENT: No discharge. Pharynx clear. Neck: Trachea midline. No obvious mass. Resp: + accessory muscle use. No crackles. Few wheezes. Few rhonchi. No dullness on percussion. CV: Regular rate. Regular rhythm. No murmur or rub. No edema. GI: Non-tender. Non-distended. No hernia. Skin: Warm and dry. No nodule on exposed extremities. Lymph: No cervical LAD. No supraclavicular LAD. M/S: No cyanosis. No joint deformity. No clubbing. Neuro: AAOx3. Followed commands. Psych: Oriented x 3. No anxiety.      Scheduled Meds:   albuterol sulfate HFA  2 puff Inhalation Q4H While awake    And    ipratropium  2 puff Inhalation Q4H While awake    amLODIPine  5 mg Oral Daily    aspirin  81 mg Oral Daily    atorvastatin  20 mg Oral Daily    escitalopram  10 mg Oral Daily    finasteride  5 mg Oral Daily    lactulose  10 g Oral QPM    metoprolol tartrate  25 mg Oral BID    lisinopril  10 mg Oral Daily    pantoprazole  40 mg Oral QAM AC    tamsulosin  0.4 mg Oral Daily    traZODone  50 mg Oral Nightly    sodium chloride flush  10 mL Intravenous 2 times per day    enoxaparin  40 mg Subcutaneous Daily    methylPREDNISolone  40 mg Intravenous Q12H    levofloxacin  500 mg Intravenous Q24H     PRN Meds:  magnesium sulfate, potassium chloride **OR** potassium alternative oral replacement **OR** potassium chloride, albuterol sulfate HFA, sodium chloride flush, acetaminophen **OR** acetaminophen, polyethylene glycol, promethazine **OR** ondansetron    Results:  CBC:   Recent Labs     09/06/20 0147 09/07/20  0503   WBC 11.2* 9.8   HGB 12.8* 11.9*   HCT 38.8* 35.5*   MCV 91.4 90.2    198     BMP:   Recent Labs     09/06/20  0147 09/06/20  1929 09/07/20  0503   * 128* 133*   K 3.0* 2.7* 3.1*   CL 86* 87* 90*   CO2 39* 35* 40*   BUN 21* 23* 19   CREATININE 0.8 0.6* 0.6*     LIVER PROFILE:   Recent Labs     09/06/20 0147 09/07/20  0503   AST 17 13*   ALT 8* 8*   BILITOT 1.5* 0.5   ALKPHOS 70 59       Cultures:  9/6 [facility]  9/6 Resp     Films:  Chest x-ray 9/6 imaging was reviewed by me and showed   Bibasilar airspace disease     ASSESSMENT:  · Acute hypoxemic respiratory failure  · Severe COPD with acute exacerbation  · Tracheobronchitis with possible lower lobe pneumonia  · CVA with residual left-sided weakness  · Electrolytes disorder  · Elevated troponin-EKG no significant changes from prior     PLAN:  · Supplemental oxygen to maintain SaO2 >92%; wean as tolerated.  Vapotherm if needed  · Avoid BiPAP until COVID is ruled out  · Droplet plus isolation (surgical mask, eye protection, gown, glove)  · Emergent Covid 19 testing  · IV antibiotics to include Levaquin D#2  · Follow up Cx   · Moved to negative pressure room in case needs aerosolized procedure  · Avoid NEBs as able-albuterol MDI strongly preferred   · D/C Solu Medrol and start Prednisone taper   · Electrolytes replacement per protocol   · D/C IVF  · Ok for diet  · DVT prophylaxis: Lovenox  · MRSA prophylaxis: Bactroban  · Okay to move out of the ICU from our perspective

## 2020-09-07 NOTE — FLOWSHEET NOTE
09/07/20 0505   Vitals   Temp 97.9 °F (36.6 °C)   Temp Source Oral   Pulse 64   Resp 18   /66   MAP (mmHg) 77   Level of Consciousness 0   MEWS Score 1   Oxygen Therapy   SpO2 94 %       Pt given a bath and linen change after urinating in the bed. Pt still confused and thinks that I am his step daughter. Pt made inapproprate comments when being changed. He did allow lab to draw his blood the second time around. O2 stayed WNL overnight with 2 L O2 N.C. Pt denies any shortness of breath and or pain. Call light within reach, bed in lowest position. Will continue to monitor.

## 2020-09-07 NOTE — PROGRESS NOTES
Patient is able to demonstrated the ability to move from a reclining position to an upright position within the recliner.  [facility]

## 2020-09-07 NOTE — PROGRESS NOTES
Pt refusing to continue with K+ peripherally. Rate was decreased and ran with NS but pt still could not tolerate; pt angry yelling profanities and requesting I leave his room. Perfect serve sent to see if we can switch to K+ PO.

## 2020-09-07 NOTE — FLOWSHEET NOTE
09/06/20 2325   Vitals   Temp 97.7 °F (36.5 °C)   Temp Source Bladder   Pulse 57   Heart Rate Source Monitor   Resp 18   /63   Level of Consciousness 0   MEWS Score 1   Oxygen Therapy   SpO2 97 %   O2 Device Nasal cannula   O2 Flow Rate (L/min) 2 L/min     Pt resting in bed. Gave pt 40 mEq of K+ PO per order-pt tolerated, VSS. Pt appears to be confused and had to be reoriented to place and situation. Call light within reach, bed alarm on. Will continue to monitor.

## 2020-09-08 LAB
ANION GAP SERPL CALCULATED.3IONS-SCNC: 6 MMOL/L (ref 3–16)
BUN BLDV-MCNC: 16 MG/DL (ref 7–20)
CALCIUM SERPL-MCNC: 9.1 MG/DL (ref 8.3–10.6)
CHLORIDE BLD-SCNC: 94 MMOL/L (ref 99–110)
CO2: 36 MMOL/L (ref 21–32)
CREAT SERPL-MCNC: 0.6 MG/DL (ref 0.8–1.3)
GFR AFRICAN AMERICAN: >60
GFR NON-AFRICAN AMERICAN: >60
GLUCOSE BLD-MCNC: 135 MG/DL (ref 70–99)
MAGNESIUM: 2.2 MG/DL (ref 1.8–2.4)
POTASSIUM SERPL-SCNC: 3.4 MMOL/L (ref 3.5–5.1)
REPORT: NORMAL
SODIUM BLD-SCNC: 136 MMOL/L (ref 136–145)
TROPONIN: <0.01 NG/ML

## 2020-09-08 PROCEDURE — 97530 THERAPEUTIC ACTIVITIES: CPT

## 2020-09-08 PROCEDURE — 80048 BASIC METABOLIC PNL TOTAL CA: CPT

## 2020-09-08 PROCEDURE — 6360000002 HC RX W HCPCS: Performed by: INTERNAL MEDICINE

## 2020-09-08 PROCEDURE — 36415 COLL VENOUS BLD VENIPUNCTURE: CPT

## 2020-09-08 PROCEDURE — 83735 ASSAY OF MAGNESIUM: CPT

## 2020-09-08 PROCEDURE — 6370000000 HC RX 637 (ALT 250 FOR IP): Performed by: INTERNAL MEDICINE

## 2020-09-08 PROCEDURE — 97162 PT EVAL MOD COMPLEX 30 MIN: CPT

## 2020-09-08 PROCEDURE — 97535 SELF CARE MNGMENT TRAINING: CPT

## 2020-09-08 PROCEDURE — 99232 SBSQ HOSP IP/OBS MODERATE 35: CPT | Performed by: INTERNAL MEDICINE

## 2020-09-08 PROCEDURE — 1200000000 HC SEMI PRIVATE

## 2020-09-08 PROCEDURE — 97166 OT EVAL MOD COMPLEX 45 MIN: CPT

## 2020-09-08 PROCEDURE — 99233 SBSQ HOSP IP/OBS HIGH 50: CPT | Performed by: INTERNAL MEDICINE

## 2020-09-08 PROCEDURE — 94640 AIRWAY INHALATION TREATMENT: CPT

## 2020-09-08 PROCEDURE — 92610 EVALUATE SWALLOWING FUNCTION: CPT

## 2020-09-08 PROCEDURE — 97110 THERAPEUTIC EXERCISES: CPT

## 2020-09-08 PROCEDURE — 2700000000 HC OXYGEN THERAPY PER DAY

## 2020-09-08 PROCEDURE — 84484 ASSAY OF TROPONIN QUANT: CPT

## 2020-09-08 PROCEDURE — 97116 GAIT TRAINING THERAPY: CPT

## 2020-09-08 PROCEDURE — 94761 N-INVAS EAR/PLS OXIMETRY MLT: CPT

## 2020-09-08 PROCEDURE — 2580000003 HC RX 258: Performed by: INTERNAL MEDICINE

## 2020-09-08 PROCEDURE — 92526 ORAL FUNCTION THERAPY: CPT

## 2020-09-08 RX ADMIN — METOPROLOL TARTRATE 25 MG: 50 TABLET, FILM COATED ORAL at 08:31

## 2020-09-08 RX ADMIN — ALBUTEROL SULFATE 2.5 MG: 2.5 SOLUTION RESPIRATORY (INHALATION) at 12:54

## 2020-09-08 RX ADMIN — LEVOFLOXACIN 500 MG: 5 INJECTION, SOLUTION INTRAVENOUS at 18:36

## 2020-09-08 RX ADMIN — TRAZODONE HYDROCHLORIDE 50 MG: 50 TABLET ORAL at 22:10

## 2020-09-08 RX ADMIN — ASPIRIN 81 MG CHEWABLE TABLET 81 MG: 81 TABLET CHEWABLE at 08:31

## 2020-09-08 RX ADMIN — Medication 10 ML: at 08:35

## 2020-09-08 RX ADMIN — ENOXAPARIN SODIUM 40 MG: 40 INJECTION SUBCUTANEOUS at 08:32

## 2020-09-08 RX ADMIN — PREDNISONE 40 MG: 20 TABLET ORAL at 08:32

## 2020-09-08 RX ADMIN — ALBUTEROL SULFATE 2.5 MG: 2.5 SOLUTION RESPIRATORY (INHALATION) at 07:00

## 2020-09-08 RX ADMIN — ATORVASTATIN CALCIUM 20 MG: 10 TABLET, FILM COATED ORAL at 08:31

## 2020-09-08 RX ADMIN — LISINOPRIL 10 MG: 10 TABLET ORAL at 08:32

## 2020-09-08 RX ADMIN — POTASSIUM CHLORIDE 40 MEQ: 1500 TABLET, EXTENDED RELEASE ORAL at 08:32

## 2020-09-08 RX ADMIN — LACTULOSE 10 G: 10 SOLUTION ORAL at 18:36

## 2020-09-08 RX ADMIN — AMLODIPINE BESYLATE 5 MG: 5 TABLET ORAL at 08:32

## 2020-09-08 RX ADMIN — METOPROLOL TARTRATE 25 MG: 50 TABLET, FILM COATED ORAL at 22:10

## 2020-09-08 RX ADMIN — Medication 10 ML: at 22:09

## 2020-09-08 RX ADMIN — PANTOPRAZOLE SODIUM 40 MG: 40 TABLET, DELAYED RELEASE ORAL at 06:14

## 2020-09-08 RX ADMIN — ALBUTEROL SULFATE 2.5 MG: 2.5 SOLUTION RESPIRATORY (INHALATION) at 19:30

## 2020-09-08 RX ADMIN — FINASTERIDE 5 MG: 5 TABLET, FILM COATED ORAL at 08:32

## 2020-09-08 RX ADMIN — ESCITALOPRAM OXALATE 10 MG: 10 TABLET ORAL at 08:32

## 2020-09-08 RX ADMIN — TAMSULOSIN HYDROCHLORIDE 0.4 MG: 0.4 CAPSULE ORAL at 08:31

## 2020-09-08 ASSESSMENT — PAIN SCALES - GENERAL
PAINLEVEL_OUTOF10: 0

## 2020-09-08 NOTE — PROGRESS NOTES
Inpatient Occupational Therapy  Evaluation and Treatment    Unit: 2 [place]  Date:  9/8/2020  Patient Name:    Skyler Jaime  Admitting diagnosis:  Acute on chronic respiratory failure with hypoxia and hypercapnia ([facility] [J96.21, J96.22]  Acute on chronic respiratory failure with hypoxia and hypercapnia ([facility] [J96.21, J96.22]  Admit Date:  9/6/2020  Precautions/Restrictions/WB Status/ Lines/ Wounds/ Oxygen:   Acute on chronic respiratory failure with hypoxia and hypercapnia (HCC) [J96.21, J96.22]  Acute on chronic respiratory failure with hypoxia and hypercapnia (HCC) [J96.21, J96.22]  Treatment Time:  830-820  Treatment Number: 1   Timed code treatment minutes  40minutes   Total Treatment minutes:   50  minutes    Patient Goals for Therapy:  \" go home  \"      Discharge Recommendations: return to F with OT   DME needs for discharge: NA       Therapy recommendations for staff:   Assist of 1 with use of rolling walker (RW) for all transfers to/from [facility]  to/from [facility]    History of Present Illness: Per H&P on 9-6-20   a 70 y.o. male presents with complaints of shortness of breath he has a history of COPD and has had episodes of acute respiratory distress he has not complained of any chest pain. He denies any nausea vomiting. .    Patient admitted in June 2020 treated for COPD exacerbation A. fib with RVR  history of stroke with residual left-sided weakness  previous history of lung cancer with resection several years ago by history, dementia    Home Health S4 Level Recommendation:  NA  AM-PAC Score: 16    Preadmission Environment    Pt. [facility] spouse - spouse has \"broken back\" and is limited physically, she gets and aid             Pt went to nursing home after previous admission in June 2020 since his wife could not help him. States he wasn't getting therapy there. Goal is to return home when he and his wife are better.  (Per chart review pt transitioned to LTC at [facility] on 8/18/20)  Home environment:    apartment   Steps to enter first floor:   5 steps to enter and hand rail unilateral - exterior             Steps to second floor: N/A  Bathroom:       Tub/Shower unit, Tub Transfer Bench and Raised toilet seat w/ arms  Equipment owned:      Rolling Walker (RW), manual W/C, SPC, home O2 (2L) continuous, pulse ox, reacher and sock aid, LH shoehorn, adjustable bed - manual w/c on order to use for energy conservation  Has lift chair at NH (wife had it shipped there)     Preadmission Status / PLOF:  History of falls             No  Pt. Able to drive          No - wife usually drives  Pt Fully independent with ADL's         No - HHA assists with bathing 1x/week, typically able to manage dressing independently (only wears t-shirt and boxer shorts)   Pt. Required assistance from wife and  (1x/week) for:  Cleaning, Cooking and Laundry     Pt. Fully independent for transfers and gait and walked with: Walker  At nursing facility pt has been completing pivot transfer to w/c with assist of 1-2. Pt pushes the w/c himself. Staff assists with toileting. Pt states he has been at [facility] for \"2-3 months, maybe 6 months, I don't know\"     Subjective  Patient lying supine in bed with no family present. Pt agreeable to this OT eval & tx.      Upper Extremity ROM:    WFL on the right   significant limitation on the left shoulder movement    Upper Extremity Strength:    Strength Assessment (measured on a 0-5 scale):   Right UE - WFL    left UE - 2-/5 shoulder   Upper Extremity Sensation    WFL    Upper Extremity Proprioception:  Impaired    Coordination and Tone  WFL on the right   Diminished on the left     Balance  Functional Sitting Balance:  WFL  Functional Standing Balance:Diminished    Bed mobility:    Supine to sit:   CGA  Sit to supine:   Not Tested  Rolling:    Independent  Scooting in sitting:  Independent  Scooting to head of bed:   Not Tested    Bridging:   Not Tested    Transfers:    Sit to stand:  CGA  Stand to sit:  CGA  Bed to chair:   CGA  Standard toilet: Not Tested  Bed to [facility]:  Not Tested    Dressing:      UE:   Not Tested  LE:    Min A  to don pants and max assist to don socks     Bathing:    UE:  Not Tested  LE:  Not Tested    Eating:   Not Tested    Toileting:  CGA to stand and use the urinal    Activity Tolerance   Pt completed therapy session with SOB   Pt completed therapy session with SOB noted with ambulation and transfers   Supine (at rest) SpO2: 91% on 2L, up to 95% while resting  HR: 67 bpm   Sitting EOB SpO2: 93-95% on 2L sitting EOB  After standing SpO2: 89% on 2L was lowest,  Positioning Needs:   Pt reclined in chair, alarm set, positioned in proper neutral alignment and pressure relief provided. Exercise / Activities Initiated:   N/A    Patient/Family Education:   Role of OT    Assessment of Deficits: Pt seen for Occupational therapy evaluation in acute care setting. Pt demonstrated decreased Activity tolerance, ADLs, Balance , Bed mobility, ROM, Strength and Transfers. Pt functioning below baseline and will likely benefit from skilled occupational therapy services to maximize safety and independence. Goal(s) : To be met in 3 Visits:  1). Bed to toilet/BSC: SBA with RW     To be met in 5 Visits:  1). Supine to/from Sit:  SBA  2). Upper Body Bathing:   SBA  3). Lower Body Bathing:   Min A  and Mod A   4). Upper Body Dressing:  SBA  5). Lower Body Dressing:  CGA and Min A   6). Pt to demonstrate UE exs x 15 reps with minimal cues    Rehabilitation Potential:  Fair for goals listed above. Strengths for achieving goals include: Pt cooperative  Barriers to achieving goals include:  Complexity of condition     Plan: To be seen 5 x/wk while in acute care setting for therapeutic exercises, bed mobility, transfers, dressing, bathing, family/patient education, ADL/IADL retraining, energy conservation training.      Edmar Charles OTR/L [id number]          If patient discharges from this facility prior to next visit, this note will serve as the Discharge Summary

## 2020-09-08 NOTE — FLOWSHEET NOTE
09/08/20 1548   Encounter Summary   Services provided to: Patient   Referral/Consult From: [facility] Family members   Continue Visiting   (9/8/Phone support.  Prayed with patient.)   Complexity of Encounter Moderate   Length of Encounter 15 minutes   Spiritual/Buddhism   Type Spiritual support   Assessment Approachable   Intervention Active listening;Nurtured hope;Prayer   Outcome Expressed gratitude;Engaged in conversation

## 2020-09-08 NOTE — PROGRESS NOTES
Pulmonary & Critical Care MedicineProgress Note    CC: COPD exacerbation    Events of Last 24 hours:   Patient feels better, less sob. Invasive Lines: PIVs       Vitals:  Blood pressure (!) 128/58, pulse 58, temperature 98.2 °F (36.8 °C), temperature source Oral, resp. rate 20, height 5' 7\" (1.702 m), weight 188 lb 9.6 oz (85.5 kg), SpO2 96 %. on 2l NC  Temp  Av.6 °F (36.4 °C)  Min: 97.3 °F (36.3 °C)  Max: 98.2 °F (36.8 °C)    Intake/Output Summary (Last 24 hours) at 2020 1156  Last data filed at 2020 1102  Gross per 24 hour   Intake 240 ml   Output 1600 ml   Net -1360 ml     EXAM:  Gen: No distress. Ill-appearing  Eyes: PERRL. No sclera icterus. No conjunctival injection. ENT: No discharge. Pharynx clear. Neck: Trachea midline. No obvious mass. Resp: No accessory muscle use. No crackles. Few wheezes. Few rhonchi. No dullness on percussion. CV: Regular rate. Regular rhythm. No murmur or rub. No edema. GI: Non-tender. Non-distended. No hernia. Skin: Warm and dry. No nodule on exposed extremities. Lymph: No cervical LAD. No supraclavicular LAD. M/S: No cyanosis. No joint deformity. No clubbing.    Neuro:  Awake, Alert, moving all extremities    Scheduled Meds:   predniSONE  40 mg Oral Daily    albuterol  2.5 mg Nebulization TID    amLODIPine  5 mg Oral Daily    aspirin  81 mg Oral Daily    atorvastatin  20 mg Oral Daily    escitalopram  10 mg Oral Daily    finasteride  5 mg Oral Daily    lactulose  10 g Oral QPM    metoprolol tartrate  25 mg Oral BID    lisinopril  10 mg Oral Daily    pantoprazole  40 mg Oral QAM AC    tamsulosin  0.4 mg Oral Daily    traZODone  50 mg Oral Nightly    sodium chloride flush  10 mL Intravenous 2 times per day    enoxaparin  40 mg Subcutaneous Daily    levofloxacin  500 mg Intravenous Q24H     PRN Meds:  magnesium sulfate, potassium chloride **OR** potassium alternative oral replacement **OR** potassium chloride, albuterol sulfate HFA, sodium chloride flush, acetaminophen **OR** acetaminophen, polyethylene glycol, promethazine **OR** ondansetron    Results:  CBC:   Recent Labs     09/06/20  0147 09/07/20  0503   WBC 11.2* 9.8   HGB 12.8* 11.9*   HCT 38.8* 35.5*   MCV 91.4 90.2    198     BMP:   Recent Labs     09/06/20  1929 09/07/20  0503 09/08/20  0720   * 133* 136   K 2.7* 3.1* 3.4*   CL 87* 90* 94*   CO2 35* 40* 36*   BUN 23* 19 16   CREATININE 0.6* 0.6* 0.6*     LIVER PROFILE:   Recent Labs     09/06/20  0147 09/07/20  0503   AST 17 13*   ALT 8* 8*   BILITOT 1.5* 0.5   ALKPHOS 70 59       Cultures:  9/6 BC-positive for staph  9/6 Resp   sars Cov2- neg      Films:  Chest x-ray 9/6 imaging was reviewed by me and showed   Bibasilar airspace disease     ASSESSMENT:  · Acute hypoxemic respiratory failure  · Severe COPD with acute exacerbation  · Tracheobronchitis with possible lower lobe pneumonia  · CVA with residual left-sided weakness  · Electrolytes disorder  · Elevated troponin-EKG no significant changes from prior     PLAN:  · Supplemental oxygen to maintain SaO2 >92%; wean as tolerated.  Vapotherm if needed  · IV antibiotics to include Levaquin D#3  · Inhaled bronchodilators   · Prednisone taper

## 2020-09-08 NOTE — PLAN OF CARE
SLP Evaluation Completed.  All note are found in [place], [place], [place]  Speech-Language Pathologist  Phone: [phone]  Speech Desk: [phone]

## 2020-09-08 NOTE — PROGRESS NOTES
Telemetry order placed on admission \"until discontinued\" and pt now transferred to 2W with no report of telelmetry discontinued. Reordered under the original provider to be clarified by attending provider during morning rounds.  Joseph Brown Clinical

## 2020-09-08 NOTE — PLAN OF CARE
Problem: Falls - Risk of:  Goal: Will remain free from falls  Description: Will remain free from falls  9/8/2020 1106 by Yuimko León RN  Outcome: Ongoing  9/7/2020 2238 by Royal Chente RN  Outcome: Ongoing     Problem: Falls - Risk of:  Goal: Absence of physical injury  Description: Absence of physical injury  Outcome: Ongoing     Problem: Skin Integrity:  Goal: Will show no infection signs and symptoms  Description: Will show no infection signs and symptoms  9/8/2020 1106 by Yumiko León RN  Outcome: Ongoing  9/7/2020 2238 by Royal Chente RN  Outcome: Ongoing     Problem: Skin Integrity:  Goal: Absence of new skin breakdown  Description: Absence of new skin breakdown  9/7/2020 2238 by Royal Chente RN  Outcome: Ongoing     Problem: Infection:  Goal: Will remain free from infection  Description: Will remain free from infection  Outcome: Ongoing

## 2020-09-08 NOTE — PROGRESS NOTES
Hospitalist Progress Note      PCP: Donavan Knott, APRN - CNP    Date of Admission: 9/6/2020      Hospital Course:   Pleasant 72-year-old gentleman admitted with sudden onset of respiratory distress. reviewing previous records it appears patient had a history of stroke with residual left-sided weakness and also some cognitive decline. he has had history of some aspiration episodes in the past    Subjective:   Federico Araujo is feeling better. Breathing improved     Medications:  Reviewed    Infusion Medications   Scheduled Medications    predniSONE  40 mg Oral Daily    albuterol  2.5 mg Nebulization TID    amLODIPine  5 mg Oral Daily    aspirin  81 mg Oral Daily    atorvastatin  20 mg Oral Daily    escitalopram  10 mg Oral Daily    finasteride  5 mg Oral Daily    lactulose  10 g Oral QPM    metoprolol tartrate  25 mg Oral BID    lisinopril  10 mg Oral Daily    pantoprazole  40 mg Oral QAM AC    tamsulosin  0.4 mg Oral Daily    traZODone  50 mg Oral Nightly    sodium chloride flush  10 mL Intravenous 2 times per day    enoxaparin  40 mg Subcutaneous Daily    levofloxacin  500 mg Intravenous Q24H     PRN Meds: magnesium sulfate, potassium chloride **OR** potassium alternative oral replacement **OR** potassium chloride, albuterol sulfate HFA, sodium chloride flush, acetaminophen **OR** acetaminophen, polyethylene glycol, promethazine **OR** ondansetron      Intake/Output Summary (Last 24 hours) at 9/8/2020 1308  Last data filed at 9/8/2020 1102  Gross per 24 hour   Intake 240 ml   Output 1600 ml   Net -1360 ml       Physical Exam Performed:    BP (!) 128/58   Pulse 58   Temp 98.2 °F (36.8 °C) (Oral)   Resp 20   Ht 5' 7\" (1.702 m)   Wt 188 lb 9.6 oz (85.5 kg)   SpO2 94%   BMI 29.54 kg/m²     Gen: No distress. Alert. Eyes: PERRL. No sclera icterus. No conjunctival injection. ENT: No discharge. Pharynx clear. Neck: No JVD. Trachea midline. Resp: No accessory muscle use.  No distress. Alert. Eyes: PERRL. No sclera icterus. No conjunctival injection. ENT: No discharge. Pharynx clear. Neck: Trachea midline. Normal thyroid. Resp: No accessory muscle use. No crackles. + wheezes. No rhonchi. No dullness on percussion. CV: Regular rate. Regular rhythm. No murmur or rub. No edema.        Assessment/Plan:    #Acute on chronic hypoxic respiratory failure  - he is now stable on 2L NC O2 which is what he wears at all times     #Severe COPD with acute exacerbation   #Tracheobronchitis with possible PNA  - steroids  - Levaquin D#3  - IBD    #Hypokalemia  - replaced    #CVA with residual left sided weakness  - ASA  - supportive care, he lives at an ECF  - PT    #History of paroxysmal atrial fibrillation   - currently normal sinus rhythm    #HTN  - cont norvasc, lisinopril, lopressor    #chronic diastolic CHF  - appears stable continue home medication    #Previous history of lung cancer with resection several years ago    DVT Prophylaxis:   Diet: DIET GENERAL;  Code Status: Full Code    Dispo: cont care, likely dc 1-2 days      Stefanie Alexander PA-C  9/8/2020 1:15 PM        MARIA ESTHER THAO 9/8/2020 1:19 PM

## 2020-09-08 NOTE — PROGRESS NOTES
Speech Language Pathology  Facility/Department: [facility]   CLINICAL BEDSIDE SWALLOW EVALUATION    NAME: Yefri Toledo  : 1940  MRN: [id number]    ADMISSION DATE: 2020  ADMITTING DIAGNOSIS: has BPH (benign prostatic hyperplasia); Hyperlipidemia with target LDL less than 130; Hypertension; COPD (chronic obstructive pulmonary disease) ([address] Colon polyps; Paroxysmal atrial fibrillation ([address] Vertebral compression fracture ([address] Dysphagia; History of CVA (cerebrovascular accident); History of lung cancer; Reactive depression; Abnormal chest x-ray; Acute on chronic respiratory failure with hypoxia and hypercapnia ([address] Acute hypoxemic respiratory failure ([address] Tracheobronchitis; and Disorder of electrolytes on their problem list.  ONSET DATE: 20    Recent Chest Xray/CT of Chest: (20)  Impression    Generalized interstitial prominence without superimposed focal airspace    consolidation, sizeable pleural effusion, or pneumothorax.           Date of Eval: 2020  Evaluating Therapist: Rafa Martinez    Current Diet level:  Current Diet : Regular  Current Liquid Diet : Thin    Primary Complaint  Patient Complaint: n/a    Pain:  Pain Assessment  Pain Assessment: 0-10  Pain Level: 0    Reason for Referral  Yefri Toledo was referred for a bedside swallow evaluation to assess the efficiency of his swallow function, identify signs and symptoms of aspiration and make recommendations regarding safe dietary consistencies, effective compensatory strategies, and safe eating environment. Impression  Dysphagia Diagnosis: Mild oral stage dysphagia;Mild pharyngeal stage dysphagia  Dysphagia Outcome Severity Scale: Level 5: Mild dysphagia- Distant supervision. May need one diet consistency restricted   The pt was seen this AM for a clinical bedside swallowing evaluation. The pt's nurse approved the therapist seeing the pt.  Today's evaluation revealed mild oropharyngeal dysphagia. The pt did have some coughing prior to PO trials that subsided prior to PO being initiated. Watery eyes were present throughout and tended to partially increase with PO as the session progressed. All PO textures were given. The pt tolerated all without s/s of aspiration. The only exception was the onset of delayed throat clearing when thin water was taking in successive sips via straw. This was not noted when taking individual sips via straw. Avoid straws is recommended. The pt was resistant to not using straws due to his reported fear of spilling his drinks. Vocal quality remained dry and consistent throughout. RR remained steady b/t 16-20 and O2 sats steady between 96 and 97%. Today's results are consistent with his 6/17/20 MBS results (see below). At this time continuing a regular diet with thin liquids is recommended as tolerated. If the pt is unable to consistently tolerate this diet then a repeat MBS is recommended. See the rest of this report for more details. Treatment Plan  Requires SLP Intervention: Yes  Duration/Frequency of Treatment: 2-4 x wk for LOS  D/C Recommendations: To be determined     Recommended Diet and Intervention  Diet Solids Recommendation: Regular  Liquid Consistency Recommendation: Thin  Recommended Form of Meds: Whole with water(1 pill at a time)  Recommendations: Dysphagia treatment  Therapeutic Interventions: Laryngeal exercises; Patient/Family education;Effortful swallow;Pharyngeal exercises    Compensatory Swallowing Strategies  Compensatory Swallowing Strategies: Alternate solids and liquids;Small bites/sips;Upright as possible for all oral intake;Remain upright for 30-45 minutes after meals; Other (comments)(Avoid straws)    Treatment/Goals  Short-term Goals  Timeframe for Short-term Goals: 1 week (9/15/20)  Long-term Goals  Timeframe for Long-term Goals: 1 week (9/15/20)  Goal 1: The pt will consistenlty tolerate his safest and least restrictive diet  Dysphagia Goals: The patient will tolerate recommended diet without observed clinical signs of aspiration; The patient will recall and perform compensatory strategies, with no cues. General  Chart Reviewed: Yes  Subjective  Subjective: The pt required some encouragement to participate initially but overall was able to adequately participate. He denies any dysphagia symptoms. He was well aware that he was seen in the past by Speech Therapy with previous MBS completed in June of 2020. Behavior/Cognition: Alert; Cooperative;Pleasant mood  Communication Observation: Functional  Follows Directions: Simple  Dentition: Dentures top;Dentures bottom  Patient Positioning: Upright in chair  Baseline Vocal Quality: Normal  Prior Dysphagia History: Yes; The pt was seen previously in June of 2020 by Speech Therapy. An MBS was completed on 6/17/20 that revealed some flash penetration when taking consecutive sips of thin water via straw. There was no actual aspiration. Dental soft bite sized with thin liquids was recommended at that time  Consistencies Administered: All    Vision/Hearing  Hearing  Hearing: Exceptions to [facility]  Hearing Exceptions: Hard of hearing/hearing concerns    Oral Motor Deficits  Oral/Motor  Oral Motor: Exceptions to [facility]  Lingual ROM: Reduced left; Reduced right(partially reduced)    Oral Phase Dysfunction  Oral Phase  Oral Phase: Exceptions  Oral Phase Dysfunction  Impaired Mastication: Reg Solid(Mildly prolonged but functional)     Indicators of Pharyngeal Phase Dysfunction   Pharyngeal Phase  Pharyngeal Phase: Exceptions  Indicators of Pharyngeal Phase Dysfunction  Decreased Laryngeal Elevation: All(partially reduced per palpitation)  Throat Clearing - Delayed:  Thin - straw(x 1 when taking large and successive sips via straw)    Prognosis  Prognosis  Prognosis for safe diet advancement: good  Barriers to reach goals: motivation;behavior  Individuals consulted  Consulted and agree with results and recommendations: Patient;RN    Education  Patient Education: Education was given re: reults, recommendations, swallowing precautions and swallowing exercises. The following swallowing exercises were trained with a handout given summarizing them: Jut jaw forward and hold for count of 5; Press tongue to roof of mouth for count of 5; Lower Jaw against resistance; Falsetto EE; vowl prolongations and hard glottal attacks.   Patient Education Response: Demonstrated understanding  Safety Devices in place: Yes  Type of devices: Left in chair;Nurse notified       Therapy Time  SLP Individual Minutes  Time In: 5875  Time Out: 0527  Minutes: [address] [facility]  Speech-Language Pathologist  Phone: [phone]  Fax: [phone]   9/8/2020 10:18 AM

## 2020-09-08 NOTE — PLAN OF CARE
Problem: Falls - Risk of:  Goal: Will remain free from falls  Description: Will remain free from falls  Outcome: Ongoing     Problem: Skin Integrity:  Goal: Will show no infection signs and symptoms  Description: Will show no infection signs and symptoms  Outcome: Ongoing  Goal: Absence of new skin breakdown  Description: Absence of new skin breakdown  Outcome: Ongoing     Problem: Daily Care:  Goal: Daily care needs are met  Description: Daily care needs are met  Outcome: Ongoing     Problem: Pain:  Goal: Patient's pain/discomfort is manageable  Description: Patient's pain/discomfort is manageable  Outcome: Ongoing     Problem: Discharge Planning:  Goal: Patients continuum of care needs are met  Description: Patients continuum of care needs are met  Outcome: Ongoing

## 2020-09-08 NOTE — PROGRESS NOTES
Awake, in bed. o2 was off of patient face. o2 sat was 86% on room air. Replaced at 2L. o2 sat up to 9%. Potasium is 3.4. replaced with 40meq KDUR as ordered. Physical therapy in to see patient.

## 2020-09-08 NOTE — PROGRESS NOTES
Inpatient Physical Therapy Evaluation and Treatment    Unit: 2 [place]  Date:  9/8/2020  Patient Name:    Federico Araujo  Admitting diagnosis:  Acute on chronic respiratory failure with hypoxia and hypercapnia ([facility] [J96.21, J96.22]  Acute on chronic respiratory failure with hypoxia and hypercapnia ([facility] [J96.21, J96.22]  Admit Date:  9/6/2020  Precautions/Restrictions/WB Status/ Lines/ Wounds/ Oxygen: Fall risk, Bed/chair alarm, Lines -Supplemental O2 (2L) and Telemetry, mildly thick (nectar) liquids - No straws, residual L UE weakness from CVA    Treatment Time:  08:30-09:22  Treatment Number:  1   Timed Code Treatment Minutes: 42 minutes  Total Treatment Minutes:  52  minutes    Patient Goals for Therapy: \" go home when my wife and I are better \"          Discharge Recommendations: Return to ECF with PT (per chart pt has transitioned to LTC however pt does not seem to be aware of this or does not remember)  DME needs for discharge: defer to facility       Therapy recommendation for EMS Transport: can transport by wheelchair    Therapy recommendations for staff:   Assist of 1 with use of rolling walker (RW) and gait belt for all transfers and ambulation to/from [facility]  to/from chair    History of Present Illness: 71 yo male admitted to [facility] for SOB and hypoxia (SpO2 80%). Per chart pt lives at [facility] and ambulates with a walker. Pt has hx of CVA with left sided weakness and some cognitive decline. He also has hx of aspiration. COVID negative. PMH: lung CA (lung resection), HTN, HDL, emphysema, CVA, T2 fx, BPH, A-fib with RVR, ankle fx repair    Home Health S4 Level Recommendation:  NA  AM-PAC Mobility Score    AM-PAC Inpatient Mobility Raw Score : 17       Preadmission Environment    Pt. Angelita Saravia spouse - spouse has \"broken back\" and is limited physically, she gets and aid   Pt went to nursing home after previous admission in June 2020 since his wife could not help him. States he wasn't getting therapy there.  Goal is Tone  WNL    Balance  Sitting:  Good ; Independent  Comments: 6 minutes unsupported at EOB for static and dynamic activities    Standing: Fair +; CGA  Comments: 1 + 2 minute with posterior lean against bed without UE support, decreased posterior lean with use of UE on RW however pt still had mild posterior sway    Bed Mobility   Supine to Sit:    CGA with HOB elevated and hand over hand assist for use of rail  Sit to Supine:   Not Tested  Rolling:   CGA  Scooting in sitting: Independent  Scooting in supine:  Not Tested    Transfer Training     Sit to stand:   CGA from bed and chair  Stand to sit:   CGA to chair (2 trials)  Bed to Chair:   CGA with use of gait belt and rolling walker (RW)    Gait gait completed as indicated below  Distance:      8 ft  Deviations (firm surface/linoleum):  decreased veronica, decreased step length bilaterally and decreased step height bilaterally, lacks heel strike bilaterally, excessive pronation bilaterally  Assistive Device Used:    gait belt and rolling walker (RW)  Level of Assist:    CGA  Comment: therapist managed O2 line, pt moves slowly but is steady, pt was limited by fatigue and requested to sit    Stair Training deferred, pt unsafe/ not appropriate to complete stairs at this time    Activity Tolerance   Pt completed therapy session with SOB noted with ambulation and transfers   Supine (at rest) SpO2: 91% on 2L, up to 95% while resting  HR: 67 bpm   Sitting EOB SpO2: 93-95% on 2L sitting EOB  After ambulation SpO2: 89% on 2L was lowest, recovered well with PLB to >90%  HR: 109 bpm    Positioning Needs   Pt reclined in chair, alarm set, positioned in proper neutral alignment and pressure relief provided.    Call light provided and all needs within reach    Exercises Initiated  all completed bilaterally unless indicated  SLR x 5 reps  Hip abduction: x 10 reps   Discussed Ankle pumps as part of HEP, pt verbalized understanding    Other  See OT note for assist with lower body

## 2020-09-09 VITALS
HEIGHT: 67 IN | WEIGHT: 185.4 LBS | SYSTOLIC BLOOD PRESSURE: 126 MMHG | HEART RATE: 73 BPM | DIASTOLIC BLOOD PRESSURE: 69 MMHG | RESPIRATION RATE: 18 BRPM | OXYGEN SATURATION: 92 % | TEMPERATURE: 97.9 F | BODY MASS INDEX: 29.1 KG/M2

## 2020-09-09 PROBLEM — J96.22 ACUTE ON CHRONIC RESPIRATORY FAILURE WITH HYPOXIA AND HYPERCAPNIA (HCC): Status: RESOLVED | Noted: 2020-09-06 | Resolved: 2020-09-09

## 2020-09-09 PROBLEM — J96.21 ACUTE ON CHRONIC RESPIRATORY FAILURE WITH HYPOXIA AND HYPERCAPNIA (HCC): Status: RESOLVED | Noted: 2020-09-06 | Resolved: 2020-09-09

## 2020-09-09 LAB
ANION GAP SERPL CALCULATED.3IONS-SCNC: 8 MMOL/L (ref 3–16)
BUN BLDV-MCNC: 15 MG/DL (ref 7–20)
CALCIUM SERPL-MCNC: 9 MG/DL (ref 8.3–10.6)
CHLORIDE BLD-SCNC: 97 MMOL/L (ref 99–110)
CO2: 33 MMOL/L (ref 21–32)
CREAT SERPL-MCNC: 0.6 MG/DL (ref 0.8–1.3)
GFR AFRICAN AMERICAN: >60
GFR NON-AFRICAN AMERICAN: >60
GLUCOSE BLD-MCNC: 113 MG/DL (ref 70–99)
MAGNESIUM: 2.1 MG/DL (ref 1.8–2.4)
POTASSIUM REFLEX MAGNESIUM: 3.2 MMOL/L (ref 3.5–5.1)
S PYO THROAT QL CULT: NORMAL
SARS-COV-2, NAAT: NOT DETECTED
SODIUM BLD-SCNC: 138 MMOL/L (ref 136–145)

## 2020-09-09 PROCEDURE — 6360000002 HC RX W HCPCS: Performed by: INTERNAL MEDICINE

## 2020-09-09 PROCEDURE — 94761 N-INVAS EAR/PLS OXIMETRY MLT: CPT

## 2020-09-09 PROCEDURE — 99232 SBSQ HOSP IP/OBS MODERATE 35: CPT | Performed by: INTERNAL MEDICINE

## 2020-09-09 PROCEDURE — 94640 AIRWAY INHALATION TREATMENT: CPT

## 2020-09-09 PROCEDURE — 36415 COLL VENOUS BLD VENIPUNCTURE: CPT

## 2020-09-09 PROCEDURE — 2700000000 HC OXYGEN THERAPY PER DAY

## 2020-09-09 PROCEDURE — 99239 HOSP IP/OBS DSCHRG MGMT >30: CPT | Performed by: INTERNAL MEDICINE

## 2020-09-09 PROCEDURE — 92526 ORAL FUNCTION THERAPY: CPT

## 2020-09-09 PROCEDURE — U0002 COVID-19 LAB TEST NON-CDC: HCPCS

## 2020-09-09 PROCEDURE — 83735 ASSAY OF MAGNESIUM: CPT

## 2020-09-09 PROCEDURE — 80048 BASIC METABOLIC PNL TOTAL CA: CPT

## 2020-09-09 PROCEDURE — 6370000000 HC RX 637 (ALT 250 FOR IP): Performed by: INTERNAL MEDICINE

## 2020-09-09 PROCEDURE — 2580000003 HC RX 258: Performed by: INTERNAL MEDICINE

## 2020-09-09 RX ORDER — LEVOFLOXACIN 500 MG/1
500 TABLET, FILM COATED ORAL DAILY
Qty: 7 TABLET | Refills: 0
Start: 2020-09-09 | End: 2020-09-16

## 2020-09-09 RX ORDER — PREDNISONE 10 MG/1
TABLET ORAL
Qty: 18 TABLET | Refills: 0
Start: 2020-09-09

## 2020-09-09 RX ADMIN — PANTOPRAZOLE SODIUM 40 MG: 40 TABLET, DELAYED RELEASE ORAL at 05:54

## 2020-09-09 RX ADMIN — PREDNISONE 40 MG: 20 TABLET ORAL at 08:52

## 2020-09-09 RX ADMIN — ALBUTEROL SULFATE 2.5 MG: 2.5 SOLUTION RESPIRATORY (INHALATION) at 07:34

## 2020-09-09 RX ADMIN — AMLODIPINE BESYLATE 5 MG: 5 TABLET ORAL at 08:52

## 2020-09-09 RX ADMIN — METOPROLOL TARTRATE 25 MG: 50 TABLET, FILM COATED ORAL at 08:52

## 2020-09-09 RX ADMIN — POTASSIUM CHLORIDE 40 MEQ: 1500 TABLET, EXTENDED RELEASE ORAL at 06:39

## 2020-09-09 RX ADMIN — LEVOFLOXACIN 500 MG: 5 INJECTION, SOLUTION INTRAVENOUS at 17:49

## 2020-09-09 RX ADMIN — LISINOPRIL 10 MG: 10 TABLET ORAL at 08:52

## 2020-09-09 RX ADMIN — Medication 10 ML: at 08:55

## 2020-09-09 RX ADMIN — ATORVASTATIN CALCIUM 20 MG: 10 TABLET, FILM COATED ORAL at 08:52

## 2020-09-09 RX ADMIN — ASPIRIN 81 MG CHEWABLE TABLET 81 MG: 81 TABLET CHEWABLE at 08:52

## 2020-09-09 RX ADMIN — Medication 2 PUFF: at 03:14

## 2020-09-09 RX ADMIN — ALBUTEROL SULFATE 2.5 MG: 2.5 SOLUTION RESPIRATORY (INHALATION) at 20:16

## 2020-09-09 RX ADMIN — ESCITALOPRAM OXALATE 10 MG: 10 TABLET ORAL at 08:52

## 2020-09-09 RX ADMIN — ALBUTEROL SULFATE 2.5 MG: 2.5 SOLUTION RESPIRATORY (INHALATION) at 14:57

## 2020-09-09 RX ADMIN — FINASTERIDE 5 MG: 5 TABLET, FILM COATED ORAL at 08:52

## 2020-09-09 RX ADMIN — ENOXAPARIN SODIUM 40 MG: 40 INJECTION SUBCUTANEOUS at 08:53

## 2020-09-09 RX ADMIN — TAMSULOSIN HYDROCHLORIDE 0.4 MG: 0.4 CAPSULE ORAL at 08:52

## 2020-09-09 ASSESSMENT — PAIN SCALES - GENERAL
PAINLEVEL_OUTOF10: 0
PAINLEVEL_OUTOF10: 0

## 2020-09-09 NOTE — CARE COORDINATION
DISCHARGE ORDER  Date/Time 2020 3:21 PM  Completed by: Antoinette Ames, Case Management    Patient Name: Marletta Nissen    : 1940      Admit order Date and Status: IP 2020  Noted discharge order. (verify MD's last order for status of admission/Traditional Medicare 3 [place] Inpatient qualifying stay required for SNF)    Confirmed discharge plan with:              Patient:  Yes              When pt confirms DC plan does any support person need to be contacted by CM YES- Royal Brandt                   Discharge to Facility:  [address] [facility] phone number for staff giving report: [phone]  Pre-certification completed: [address] [place] [address] Dr Notification ([facility]) completed: LTC- Transfer of PAS[facility] from [facility] to [facility],  notified [facility] of pt's/ families wish to change facilities. Discharge orders and Continuity of Care faxed to facility: YES      Transportation:               Medical Transport explained with choice list offered to pt/family. Choice: (no preference)  Agency used: [address] up time:  20:00     Pt/family/Nursing/Facility aware of  time: wife, Royal Brandt and [address] ([facility] admissions)  Ambulance form completed: YES      Comments:    Pt is being d/c'd to [facility] (LTC) today. Pt's O2 sats are 94% on RA. Discharge timeout done with [facility] . All discharge needs and concerns addressed. Discharging nurse to complete LACHELLE, reconcile AVS, and place final copy with patient's discharge packet. Discharging RN to ensure that written prescriptions for  Level II medications are sent with patient to the facility as per protocol.
prior to discharge if accepted. ADDENDUM: 15:00 CHARO Cornelius: Received VM from [address] [place] [address]. at [facility] stating will accept pt at [place]. Wife updated.

## 2020-09-09 NOTE — PROGRESS NOTES
Speech Language Pathology  Facility/Department: [address] [place] [address] [address] [place] [facility]ICAL  Dysphagia Daily Treatment Note    NAME: Federico Araujo  : 1940  MRN: [id number]     Recommend continue regular solids/thin liquids, straws okay; meds whole with thin, 1 pill at a time. Recommend upright position during and 30 minutes after meals, small bites/sips, slow rate of po intake, oral care before and after meals, and encourage self-feeding. Patient Diagnosis(es):   Patient Active Problem List    Diagnosis Date Noted    Paroxysmal atrial fibrillation [facility])      Priority: High    Acute on chronic respiratory failure with hypoxia and hypercapnia ([facility] 2020    Acute hypoxemic respiratory failure (HCC)     Tracheobronchitis     Disorder of electrolytes     Abnormal chest x-ray     Elevated troponin     Reactive depression 2019    History of CVA (cerebrovascular accident) 2019    History of lung cancer 2019    Dysphagia 2019    Vertebral compression fracture ([facility] 2019    Colon polyps 04/15/2013    COPD (chronic obstructive pulmonary disease) ([facility] 2010    BPH (benign prostatic hyperplasia) 2010    Hyperlipidemia with target LDL less than 130 2010    Hypertension 2010     Allergies: No Known Allergies  Onset Date:  admit  Subjective: Pt sitting up in bed with son present. O2 per NC at 2L. Pain: no c/o pain    Current Diet: DIET GENERAL;    Diet Tolerance:  Patient tolerating current diet level without signs/symptoms aspiration. P.O. Trials: Thin   x 3 oz by straw   Nectar / Mildly Thick       Honey / Moderately Thick       Pudding / Extremely Thick       Puree       Solid         Dysphagia Treatment and Impressions:  Pt seen sitting up in bed. Son present. Pt states he feels better this day. Pt reports no difficulty swallowing pills or breakfast this day. Pt assessed with thin liquids by straw.  Pt reports he lucas not position during and 30 minutes after meals, small bites/sips, slow rate of po intake, oral care before and after meals, straws okay, and encourage self-feeding. Plan:  D/C from ST at this time. Reconsult ST if new need arise. Continued Dysphagia treatment with goals per plan of care. Discharge Recommendations: [facility]    If pt discharges from hospital prior to Speech/Swallowing discharge, this note serves as tx and discharge summary. Total Treatment Time / Charges     Time in Time out Total Time / units   Cognitive Tx         Speech Tx      Dysphagia Tx 1148 1200 12 min/1 unit     Signature:  Belinda Garcia. Mariely Leong M.A.  [address] [facility]  Speech-Language Pathologist

## 2020-09-09 NOTE — PLAN OF CARE
Problem: Falls - Risk of:  Goal: Will remain free from falls  Description: Will remain free from falls  9/9/2020 0056 by Jael Pate RN  Outcome: Ongoing  9/8/2020 1106 by Hannah Lay RN  Outcome: Ongoing  Goal: Absence of physical injury  Description: Absence of physical injury  9/9/2020 0056 by Jael Pate RN  Outcome: Ongoing  9/8/2020 1106 by Hannah Lay RN  Outcome: Ongoing     Problem: Skin Integrity:  Goal: Will show no infection signs and symptoms  Description: Will show no infection signs and symptoms  9/9/2020 0056 by Jael Pate RN  Outcome: Ongoing  9/8/2020 1106 by Hannah Lay RN  Outcome: Ongoing  Goal: Absence of new skin breakdown  Description: Absence of new skin breakdown  Outcome: Ongoing     Problem: Infection:  Goal: Will remain free from infection  Description: Will remain free from infection  9/9/2020 0056 by Jael Pate RN  Outcome: Ongoing  9/8/2020 1106 by Hannah Lay RN  Outcome: Ongoing     Problem: Safety:  Goal: Free from accidental physical injury  Description: Free from accidental physical injury  Outcome: Ongoing  Goal: Free from intentional harm  Description: Free from intentional harm  Outcome: Ongoing     Problem: Daily Care:  Goal: Daily care needs are met  Description: Daily care needs are met  Outcome: Ongoing     Problem: Pain:  Goal: Patient's pain/discomfort is manageable  Description: Patient's pain/discomfort is manageable  Outcome: Ongoing     Problem: Skin Integrity:  Goal: Skin integrity will stabilize  Description: Skin integrity will stabilize  Outcome: Ongoing     Problem: Discharge Planning:  Goal: Patients continuum of care needs are met  Description: Patients continuum of care needs are met  Outcome: Ongoing

## 2020-09-09 NOTE — FLOWSHEET NOTE
09/08/20 1910   Vital Signs   Temp 97.8 °F (36.6 °C)   Temp Source Oral   Pulse 70   Heart Rate Source Monitor   Resp 17   /62   BP Location Right upper arm   Patient Position Semi fowlers   Level of Consciousness 0   MEWS Score 1   Oxygen Therapy   SpO2 95 %   O2 Device Nasal cannula   O2 Flow Rate (L/min) 2 L/min       Pt A/Ox4 at this time. VSS. Pt unlabored, respirations even & easy. No distress noted. Shift assessment complete. End expiratory wheezing noted upon lung ausculation. See flowsheet. PM meds given. See MAR. Denies needs at this time. Bed in low position. Bed alarm on. Call light within reach. Will continue to monitor.

## 2020-09-09 NOTE — PROGRESS NOTES
Pulmonary & Critical Care Medicine Progress Note    CC: COPD exacerbation    Events of Last 24 hours:   Patient feels better. Invasive Lines: PIVs       Vitals:  Blood pressure 120/63, pulse 65, temperature 97 °F (36.1 °C), temperature source Oral, resp. rate 21, height 5' 7\" (1.702 m), weight 185 lb 6.4 oz (84.1 kg), SpO2 95 %. on 2l NC  Temp  Av.5 °F (36.4 °C)  Min: 97 °F (36.1 °C)  Max: 97.8 °F (36.6 °C)    Intake/Output Summary (Last 24 hours) at 2020 0834  Last data filed at 2020 0825  Gross per 24 hour   Intake 890 ml   Output 1125 ml   Net -235 ml     EXAM:  Gen: No distress. Comfortable. Eyes: PERRL. No sclera icterus. No conjunctival injection. ENT: No discharge. Pharynx clear. Neck: Trachea midline. No obvious mass. Resp: No accessory muscle use. No crackles. Few wheezes. No rhonchi. No dullness on percussion. CV: Regular rate. Regular rhythm. No murmur or rub. No edema. GI: Non-tender. Non-distended. No hernia. Skin: Warm and dry. No nodule on exposed extremities. Lymph: No cervical LAD. No supraclavicular LAD. M/S: No cyanosis. No joint deformity. No clubbing.    Neuro:  Awake, Alert, moving all extremities    Scheduled Meds:   predniSONE  40 mg Oral Daily    albuterol  2.5 mg Nebulization TID    amLODIPine  5 mg Oral Daily    aspirin  81 mg Oral Daily    atorvastatin  20 mg Oral Daily    escitalopram  10 mg Oral Daily    finasteride  5 mg Oral Daily    lactulose  10 g Oral QPM    metoprolol tartrate  25 mg Oral BID    lisinopril  10 mg Oral Daily    pantoprazole  40 mg Oral QAM AC    tamsulosin  0.4 mg Oral Daily    traZODone  50 mg Oral Nightly    sodium chloride flush  10 mL Intravenous 2 times per day    enoxaparin  40 mg Subcutaneous Daily    levofloxacin  500 mg Intravenous Q24H     PRN Meds:  magnesium sulfate, potassium chloride **OR** potassium alternative oral replacement **OR** potassium chloride, albuterol sulfate HFA, sodium chloride flush, acetaminophen **OR** acetaminophen, polyethylene glycol, promethazine **OR** ondansetron    Results:  CBC:   Recent Labs     09/07/20  0503   WBC 9.8   HGB 11.9*   HCT 35.5*   MCV 90.2        BMP:   Recent Labs     09/07/20  0503 09/08/20  0720 09/09/20  0524   * 136 138   K 3.1* 3.4* 3.2*   CL 90* 94* 97*   CO2 40* 36* 33*   BUN 19 16 15   CREATININE 0.6* 0.6* 0.6*     LIVER PROFILE:   Recent Labs     09/07/20  0503   AST 13*   ALT 8*   BILITOT 0.5   ALKPHOS 59       Cultures:  9/6 BC-positive for staph  9/6 Resp   9/6 sars Cov2- neg      Films:  Chest x-ray 9/6 imaging was reviewed by me and showed   Bibasilar airspace disease     ASSESSMENT:  · Acute hypoxemic respiratory failure  · Severe COPD with acute exacerbation  · Tracheobronchitis with possible lower lobe pneumonia  · CVA with residual left-sided weakness  · Electrolytes disorder  · Elevated troponin-EKG no significant changes from prior     PLAN:  · Supplemental oxygen to maintain SaO2 >92%; wean as tolerated.    · IV antibiotics to include Levaquin D#4  · Inhaled bronchodilators   · Prednisone taper

## 2020-09-09 NOTE — DISCHARGE INSTR - COC
Continuity of Care Form    Patient Name: Irais Figueroa   :  1940  MRN:  [id number]    Admit date:  2020  Discharge date:  2020    Code Status Order: Full Code   Advance Directives:   Advance Care Flowsheet Documentation       Date/Time Healthcare Directive Type of Healthcare Directive Copy in [address] Agent's Name Healthcare Agent's Phone Number    20 [phone]  No, patient does not have an advance directive for healthcare treatment -- -- -- -- --            Admitting Physician:  Cortes Chandra MD  PCP: Felix Alvarez APRN - CNP    Discharging Nurse: India Justice RN  [address] Unit/Room#: 0217/0217-02  Discharging Unit Phone Number: [phone]    Emergency Contact:   Extended Emergency Contact Information  Primary Emergency Contact: Vandana Atwood  Address: [address] [place] [address] Phone: [phone]  Relation: Spouse  Secondary Emergency Contact: [facility] [address] Phone: [phone]  Relation: Child    Past Surgical History:  Past Surgical History:   Procedure Laterality Date    ANKLE FRACTURE SURGERY      COLONOSCOPY  2013    Diverticulosis    LUNG REMOVAL, PARTIAL  May 2009    resection of lung cancer       Immunization History:   Immunization History   Administered Date(s) Administered    Influenza A (G4V7-12) Vaccine IM 2010    Influenza Vaccine, unspecified formulation 2014, 2015    Influenza Virus Vaccine 10/01/2013    Influenza Whole 10/19/2010, 2011, 10/08/2011, 2013, 10/01/2013, 10/01/2014    Influenza, High Dose (Fluzone 65 yrs and older) 2015, 10/26/2016, 2017, 10/01/2018    Influenza, Triv, inactivated, subunit, adjuvanted, IM (Fluad 65 yrs and older) 2019    Pneumococcal Conjugate 13-valent ([id number]) 2015    Pneumococcal Polysaccharide ([id number]) 2008  Td (Adult), 5 Lf Tetanus Toxoid, Pf (Tenivac, Decavac) 05/23/2012    Tdap (Boostrix, Adacel) 05/23/2012       Active Problems:  Patient Active Problem List   Diagnosis Code    BPH (benign prostatic hyperplasia) N40.0    Hyperlipidemia with target LDL less than 130 E78.5    Hypertension I10    COPD (chronic obstructive pulmonary disease) ([facility]) J44.9    Colon polyps K63.5    Paroxysmal atrial fibrillation ([facility]) I48.0    Vertebral compression fracture ([facility]) M48.50XA    Dysphagia R13.10    History of CVA (cerebrovascular accident) Z80.78    History of lung cancer Z85. 118    Reactive depression F32.9    Abnormal chest x-ray R93.89    Elevated troponin R79.89    Acute on chronic respiratory failure with hypoxia and hypercapnia ([facility]) J96.21, J96.22    Acute hypoxemic respiratory failure ([facility]) J96.01    Tracheobronchitis J40    Disorder of electrolytes E87.8       Isolation/Infection:   Isolation            No Isolation          Patient Infection Status       Infection Onset Added Last Indicated Last Indicated By Review Planned Expiration Resolved Resolved By    None active    Resolved    COVID-19 Rule Out 09/06/20 09/06/20 09/06/20 COVID-19 (Ordered)   09/07/20 Rule-Out Test Resulted    COVID-19 Rule Out 06/14/20 06/14/20 06/14/20 COVID-19 (Ordered)   06/15/20 Rule-Out Test Resulted            Nurse Assessment:  Last Vital Signs: /67   Pulse 55   Temp 97.5 °F (36.4 °C) (Oral)   Resp 20   Ht 5' 7\" (1.702 m)   Wt 185 lb 6.4 oz (84.1 kg)   SpO2 94%   BMI 29.04 kg/m²     Last documented pain score (0-10 scale): Pain Level: 0  Last Weight:   Wt Readings from Last 1 Encounters:   09/09/20 185 lb 6.4 oz (84.1 kg)     Mental Status:  oriented, alert and thought processes intact    IV Access:  - None    Nursing Mobility/ADLs:  Walking   Assisted  Transfer  Assisted  Bathing  Assisted  Dressing  Assisted  Toileting  Assisted  Feeding  Independent  Med Admin  Assisted  Med Delivery   whole    Wound SECTION    Prognosis: Good    Condition at Discharge: Stable    Rehab Potential (if transferring to Rehab): Good    Recommended Labs or Other Treatments After Discharge: PT and OT    Physician Certification: I certify the above information and transfer of Cj Uriarte  is necessary for the continuing treatment of the diagnosis listed and that he requires [facility] for greater 30 days.      Update Admission H&P: No change in H&P    PHYSICIAN SIGNATURE:  Electronically signed by Clifton Carl MD on 9/9/20 at 2:41 PM EDT

## 2020-09-09 NOTE — PROGRESS NOTES
Handoff report and transfer of care given at bedside to  Niall Fang [facility]. Patient in stable condition, denies needs/concerns at this time. Call light within reach.

## 2020-09-09 NOTE — DISCHARGE SUMMARY
Name:  Scott Busch  Room:  0217/0217-02  MRN:    [id number]    Discharge Summary      This discharge summary is in conjunction with a complete physical exam done on the day of discharge. Discharging Physician: Dr. Christine Flaherty: 9/6/2020  Discharge:   9/9/2020     HPI taken from admission H&P:    70 y.o. male who past medical history significant for severe COPD history of atrial fibrillation and history of stroke with residual left-sided weakness, walks with a walker from formerly [facility]?   previous history of lung cancer   presented to ER with sob sudden I\onse\   pt appeared to  Be bitconfused  He thinks he  Is being dced  On 2lit o2 at home   No fever   No chest pain    No covid exposure  o2 sat were down  To  80   Pt on o2 admitted toICU    Diagnoses this Admission and Hospital Course     #Acute on chronic hypoxic respiratory failure  - he is now stable on 2L NC O2 which is what he wears at all times      #Severe COPD with acute exacerbation   #Tracheobronchitis with possible PNA  - prednisone taper complete on discharge   - Levaquin D#3/10- complete on discharge   - IBD     #Hypokalemia  - replaced     #CVA with residual left sided weakness  - ASA  - supportive care, he lives at an F  - PT     #History of paroxysmal atrial fibrillation   - currently normal sinus rhythm     #HTN  - cont norvasc, lisinopril, lopressor     #chronic diastolic CHF  - appears stable continue home medication     #Previous history of lung cancer with resection several years ago         Procedures (Please Review Full Report for Details)  None     Consults    Pulmnonary      Physical Exam at Discharge:    /67   Pulse 55   Temp 97.5 °F (36.4 °C) (Oral)   Resp 20   Ht 5' 7\" (1.702 m)   Wt 185 lb 6.4 oz (84.1 kg)   SpO2 94%   BMI 29.04 kg/m²     Gen: No distress. Alert. Eyes: PERRL. No sclera icterus. No conjunctival injection. ENT: No discharge. Pharynx clear. Neck: No JVD. Trachea midline.   Resp: No accessory muscle use. No crackles. minimal wheezes. No rhonchi. CV: Regular rate. Regular rhythm. No murmur. No rub. No edema. GI: Non-tender. Non-distended. Normal bowel sounds. Skin: Warm and dry. No nodule on exposed extremities. No rash on exposed extremities. M/S: No cyanosis. No joint deformity. No clubbing. Neuro: Awake. Moving all extremities   Psych: Oriented x 3. No anxiety or agitation. CBC:   Recent Labs     09/07/20  0503   WBC 9.8   HGB 11.9*   HCT 35.5*   MCV 90.2        BMP:   Recent Labs     09/07/20  0503 09/08/20  0720 09/09/20  0524   * 136 138   K 3.1* 3.4* 3.2*   CL 90* 94* 97*   CO2 40* 36* 33*   BUN 19 16 15   CREATININE 0.6* 0.6* 0.6*     LIVER PROFILE:   Recent Labs     09/07/20  0503   AST 13*   ALT 8*   BILITOT 0.5   ALKPHOS 59     UA:  Recent Labs     09/07/20  0509   COLORU Yellow   PHUR 7.5   CLARITYU Clear   SPECGRAV 1.010   LEUKOCYTESUR Negative   UROBILINOGEN 1.0   BILIRUBINUR Negative   BLOODU Negative   GLUCOSEU Negative       CULTURES  Blood coag neg staph in 1 out of 2 bottles, suspect contaminant    RADIOLOGY  XR CHEST PORTABLE   Final Result   Generalized interstitial prominence without superimposed focal airspace   consolidation, sizeable pleural effusion, or pneumothorax.                Discharge Medications     Medication List      START taking these medications    levoFLOXacin 500 MG tablet  Commonly known as:  LEVAQUIN  Take 1 tablet by mouth daily for 7 days        CHANGE how you take these medications    predniSONE 10 MG tablet  Commonly known as:  DELTASONE  3 tabs per day for 3 days, then 2 tabs per day for 3 days, then 1 tab per day for 3 days  What changed:  additional instructions        CONTINUE taking these medications    acetaminophen 325 MG tablet  Commonly known as:  TYLENOL  Take 2 tablets by mouth every 4 hours as needed for Fever (Fever >100.5 (38 C), pain)     albuterol sulfate  (90 Base) MCG/ACT inhaler  INHALE 2 PUFFS EVERY 6 HOURS AS NEEDED     amLODIPine 5 MG tablet  Commonly known as:  NORVASC  Take 1 tablet by mouth daily     aspirin 81 MG tablet     atorvastatin 20 MG tablet  Commonly known as:  LIPITOR  TAKE (1) TABLET DAILY     Breo Ellipta 100-25 MCG/INH Aepb inhaler  Generic drug:  fluticasone-vilanterol  INHALE 1 PUFF DAILY     Compressor/Nebulizer Misc  Nebulizer as directed.  Diagnosis: Chronic COPD with acute exacerbation     escitalopram 10 MG tablet  Commonly known as:  LEXAPRO  TAKE (1) TABLET DAILY     finasteride 5 MG tablet  Commonly known as:  PROSCAR  TAKE (1) TABLET DAILY     fluticasone 50 MCG/ACT nasal spray  Commonly known as:  FLONASE  USE 2 SPRAYS INTO EACH NOSTRIL DAILY     hydroCHLOROthiazide 25 MG tablet  Commonly known as:  HYDRODIURIL  TAKE (1) TABLET DAILY     ipratropium-albuterol 0.5-2.5 (3) MG/3ML Soln nebulizer solution  Commonly known as:  DUONEB  Inhale 3 mLs into the lungs every 6 hours as needed for Shortness of Breath     lactulose 10 GM/15ML solution  Commonly known as:  CHRONULAC  Take 15 mLs by mouth every evening     lisinopril 10 MG tablet  Commonly known as:  PRINIVIL;ZESTRIL  TAKE (1) TABLET DAILY     metoprolol tartrate 25 MG tablet  Commonly known as:  LOPRESSOR  TAKE 1 TABLET TWICE DAILY     pantoprazole 40 MG tablet  Commonly known as:  PROTONIX  TAKE (1) TABLET DAILY     polyethylene glycol 17 GM/SCOOP powder  Commonly known as:  GLYCOLAX     potassium chloride 20 MEQ extended release tablet  Commonly known as:  KLOR-CON M  Take 1 tablet by mouth daily (with breakfast)     tamsulosin 0.4 MG capsule  Commonly known as:  FLOMAX  TAKE 2 CAPSULES AT BEDTIME     therapeutic multivitamin-minerals tablet     traZODone 50 MG tablet  Commonly known as:  DESYREL  Take 1 tablet by mouth nightly           Where to Get Your Medications      Information about where to get these medications is not yet available    Ask your nurse or doctor about these medications  · levoFLOXacin 500 MG tablet  · predniSONE 10 MG tablet           Discharged in stable condition to NH    Follow Up: Follow up with PCP in 1 week     More than 30 mts spent.     Armando Herring PA-C  9/9/2020 2:57 PM        Becky Aavlos 9/9/2020 3:20 PM

## 2020-09-09 NOTE — PLAN OF CARE
Pt discharging to [facility] pickup scheduled for 8 pm. Pt aware of discharge.

## 2020-09-10 LAB
BLOOD CULTURE, ROUTINE: NORMAL
CULTURE, BLOOD 2: ABNORMAL
CULTURE, BLOOD 2: ABNORMAL
ORGANISM: ABNORMAL

## 2020-09-10 NOTE — PROGRESS NOTES
Patient in bed alert and oriented. Transportation here to bring up patient to transfer to [facility]. Denies pain and SOB. PIV was removed by Am nurse and Heart monitor removed tonight. [employer] company arrived x2 attendance.

## 2020-09-24 NOTE — PROGRESS NOTES
Pulmonary & Critical Care Medicine Progress Note  CC:acute resp failure    Events of Last 24 hours: patient still somewhat dyspneic. Invasive Lines:   IV Line: PIvs    EXAM:  BP (!) 182/85   Pulse 74   Temp 97 °F (36.1 °C) (Oral)   Resp 18   Ht 5' 7\" (1.702 m)   Wt 224 lb 3.2 oz (101.7 kg)   SpO2 95%   BMI 35.11 kg/m²  on 2l NC  Tmax: 98.1  CVP:      Intake/Output Summary (Last 24 hours) at 6/18/2020 1118  Last data filed at 6/18/2020 0928  Gross per 24 hour   Intake 2215.07 ml   Output 1000 ml   Net 1215.07 ml     General:No resp distress NCAT  Eyes: PERRL. No sclera icterus. No conjunctival injection. ENT: No discharge. Pharynx clear. Neck: Trachea midline. Normal thyroid. Resp: No accessory muscle use. No crackles, diffuse bilateral wheezing. No rhonchi. No dullness on percussion. CV: colt rate. Regular rhythm. No mumur or rub. trace edema. GI: Non-tender. Non-distended. No anton  Skin: Warm and dry. No nodule on exposed extremities. No rash on exposed extremities. Lymph: No cervical LAD. No supraclavicular LAD. M/S: No cyanosis. No joint deformity. No clubbing. Neuro: Awake. Moves all extremities, conversant.      Medications:   amLODIPine  5 mg Oral Daily    ipratropium-albuterol  1 ampule Inhalation Q4H WA    enoxaparin  40 mg Subcutaneous Daily    aspirin  81 mg Oral Daily    atorvastatin  20 mg Oral Nightly    escitalopram  10 mg Oral Daily    finasteride  5 mg Oral Daily    cefTRIAXone (ROCEPHIN) IV  1 g Intravenous Q24H    sodium chloride flush  10 mL Intravenous 2 times per day    budesonide-formoterol  2 puff Inhalation BID    lactulose  10 g Oral QPM    metoprolol tartrate  25 mg Oral BID    therapeutic multivitamin-minerals  1 tablet Oral Daily    polyethylene glycol  17 g Oral Daily    potassium chloride  20 mEq Oral Daily with breakfast    tamsulosin  0.8 mg Oral Daily    traZODone  50 mg Oral Nightly    methylPREDNISolone  40 mg Intravenous Q12H    Subsequent Stages Histo Method Verbiage: Using a similar technique to that described above, a thin layer of tissue was removed from all areas where tumor was visible on the previous stage.  The tissue was again oriented, mapped, dyed, and processed as above.

## 2020-10-19 ENCOUNTER — HOSPITAL ENCOUNTER (OUTPATIENT)
Dept: CT IMAGING | Age: 80
Discharge: HOME | End: 2020-10-19
Payer: MEDICARE

## 2020-10-19 PROCEDURE — 71250 CT THORAX DX C-: CPT

## 2020-10-22 ENCOUNTER — TELEPHONE (OUTPATIENT)
Dept: PULMONOLOGY | Age: 80
End: 2020-10-22

## 2020-10-22 NOTE — TELEPHONE ENCOUNTER
Patient did not show for 1 year CT follow-up appointment  with  on 10/22/20    n/a to change to VV and earlier time in am [place]19[place] [facility]  [place]/a to change to VV and earlier time in am 10[place]19/2020 [facility]    Patient was also no show on: 1/14/19    LOV   Assessment:10/17/19   · ROLLY squamous cell lung cancer. Resection 8/5637 and complicated by PNA, pleural effusion and SQ emphysema  · Severe COPD  · LLL near diaphragm pulmonary nodule stable since 9/2009  · Subsegmental atelectasis in the posteromedial segment of the left lower lobe with severe narrowing of its feeding bronchus. Likely mucus impaction. Patient is still refusing bronchoscopy. Identical on repeated CT 10/29/2013 to CT from 2010. · New 7 mm ROLLY nodule on CT chest 10/31/2016. Resolved on CT chest 1/9/2017  · New RLL 9 mm nodule on CT 7/11/2019.  Resolved on repeat CT chest 10/11/2019   · 60 pack year smoking- quit smoking 2006  · CVA with residual L sided weakness uses cane/walker and wheelchair                 Plan:   · Continue Breo Ellipta and Albuterol 2 puffs Q4-6 hrs PRN  · Medications refills   · Patient is up to date with Pneumococcal vaccine and influenza vaccines   · Acapella and Mucinex   · CT chest 1 year   · O2 2LPM at night

## 2020-10-22 NOTE — TELEPHONE ENCOUNTER
Transport brought patient to office and requested that office call [facility] to rescheduled appointment to irwin Krishnan Crimes [phone] [facility]

## 2020-10-26 NOTE — TELEPHONE ENCOUNTER
Okay for next available 2/2021?  Pt did complete CT    10/19/20    Impression    Stable chest CT. Chago Ramirez is underlying emphysema, stable pulmonary nodules,    and post wedge resection changes on the left

## 2020-10-27 NOTE — TELEPHONE ENCOUNTER
The number to call is [phone] ext [phone]. I called this number multiple times and no one answers will try again tomorrow. There are a few open spots in December to schedule pt.

## 2020-12-09 ENCOUNTER — TELEPHONE (OUTPATIENT)
Dept: PULMONOLOGY | Age: 80
End: 2020-12-09

## 2020-12-09 ENCOUNTER — VIRTUAL VISIT (OUTPATIENT)
Dept: PULMONOLOGY | Age: 80
End: 2020-12-09
Payer: MEDICARE

## 2020-12-09 PROCEDURE — 99214 OFFICE O/P EST MOD 30 MIN: CPT | Performed by: INTERNAL MEDICINE

## 2020-12-09 RX ORDER — PREDNISONE 10 MG/1
10 TABLET ORAL DAILY
COMMUNITY

## 2020-12-09 RX ORDER — CYCLOBENZAPRINE HCL 10 MG
10 TABLET ORAL 3 TIMES DAILY PRN
COMMUNITY

## 2020-12-09 RX ORDER — GUAIFENESIN 600 MG/1
600 TABLET, EXTENDED RELEASE ORAL 2 TIMES DAILY
Qty: 60 TABLET | Refills: 0
Start: 2020-12-09 | End: 2021-01-08

## 2020-12-09 NOTE — TELEPHONE ENCOUNTER
limited to the following:    Your Provider(s) may not able to provide medical treatment for your particular condition and you may be required to seek alternative healthcare or emergency care services.  The electronic systems or other security protocols or safeguards used in the Service could fail, causing a breach of privacy of your medical or other information.  Given regulatory requirements in certain jurisdictions, your Provider(s) diagnosis and/or treatment options, especially pertaining to certain prescriptions, may be limited. Acceptance   1. You understand that Services will be provided via Telehealth. This process involves the use of HIPAA compliant and secure, real-time audio-visual interfacing with a qualified and appropriately trained provider located at [facility]. 2. You understand that, under no circumstances, will this session be recorded. 3. You understand that the Provider(s) at [facility] and other clinical participants will be party to the information obtained during the Telehealth session in accordance with best medical practices. 4. You understand that the information obtained during the Telehealth session will be used to help determine the most appropriate treatment options. 5. You understand that You have the right to revoke this consent at any point in time. 6. You understand that Telehealth is voluntary, and that continued treatment is not dependent upon consent. 7. You understand that, in the event of non-consent to Telehealth services and/or technical difficulties, you will obtain services as typically provided in the absence of Telehealth technology. 8. You understand that this consent will be kept in Your medical record. 9. No potential benefits from the use of Telehealth or specific results can be guaranteed. Your condition may not be cured or improved and, in some cases, may get worse.    10. There are limitations in the provision of medical care and treatment via Telehealth and the Service and you may not be able to receive diagnosis and/or treatment through the Service for every condition for which you seek diagnosis and/or treatment. 11. There are potential risks to the use of Telehealth, including but not limited to the risks described in this Telehealth Consent. 12. Your Provider(s) have discussed the use of Telehealth and the Service with you, including the benefits and risks of such and you have provided oral consent to your Provider(s) for the use of Telehealth and the Service. 15. You understand that it is your duty to provide your Provider(s) truthful, accurate and complete information, including all relevant information regarding care that you may have received or may be receiving from other healthcare providers outside of the Service. 14. You understand that each of your Provider(s) may determine in his or sole discretion that your condition is not suitable for diagnosis and/or treatment using the Service, and that you may need to seek medical care and treatment a specialist or other healthcare provider, outside of the Service. 15. You understand that you are fully responsible for payment for all services provided by Provider(s) or through use of the Service and that you may not be able to use third-party insurance. 16. You represent that (a) you have read this Telehealth Consent carefully, (b) you understand the risks and benefits of the Service and the use of Telehealth in the medical care and treatment provided to you by Provider(s) using the Service, and (c) you have the legal capacity and authority to provide this consent for yourself and/or the minor for which you are consenting under applicable federal and state laws, including laws relating to the age of [de-identified] and/or parental/guardian consent.    17. You give your informed consent to the use of Telehealth by Provider(s) using the Service under the terms described in the Terms of Service and this Telehealth Consent. The patient was read the following statement and has consented to the visit as of 12/9/20. The patient has been scheduled for their first telehealth visit on 12/9/20 with . Completed by Carola Duverney patients nurse.

## 2020-12-09 NOTE — PROGRESS NOTES
times daily as needed for Muscle spasms, Disp: , Rfl:     amLODIPine (NORVASC) 5 MG tablet, Take 1 tablet by mouth daily, Disp: 30 tablet, Rfl: 3    atorvastatin (LIPITOR) 20 MG tablet, TAKE (1) TABLET DAILY, Disp: 30 tablet, Rfl: 3    metoprolol tartrate (LOPRESSOR) 25 MG tablet, TAKE 1 TABLET TWICE DAILY, Disp: 60 tablet, Rfl: 3    hydroCHLOROthiazide (HYDRODIURIL) 25 MG tablet, TAKE (1) TABLET DAILY, Disp: 30 tablet, Rfl: 3    Nebulizers (COMPRESSOR/NEBULIZER) MISC, Nebulizer as directed.  Diagnosis: Chronic COPD with acute exacerbation, Disp: 1 each, Rfl: 3    albuterol sulfate  (90 Base) MCG/ACT inhaler, INHALE 2 PUFFS EVERY 6 HOURS AS NEEDED, Disp: 18 g, Rfl: 5    tamsulosin (FLOMAX) 0.4 MG capsule, TAKE 2 CAPSULES AT BEDTIME, Disp: 60 capsule, Rfl: 0    fluticasone-vilanterol (BREO ELLIPTA) 100-25 MCG/INH AEPB inhaler, INHALE 1 PUFF DAILY, Disp: 1 each, Rfl: 5    escitalopram (LEXAPRO) 10 MG tablet, TAKE (1) TABLET DAILY, Disp: 30 tablet, Rfl: 5    finasteride (PROSCAR) 5 MG tablet, TAKE (1) TABLET DAILY, Disp: 30 tablet, Rfl: 5    fluticasone (FLONASE) 50 MCG/ACT nasal spray, USE 2 SPRAYS INTO EACH NOSTRIL DAILY, Disp: 16 g, Rfl: 5    traZODone (DESYREL) 50 MG tablet, Take 1 tablet by mouth nightly, Disp: 30 tablet, Rfl: 5    Multiple Vitamins-Minerals (THERAPEUTIC MULTIVITAMIN-MINERALS) tablet, Take 1 tablet by mouth daily, Disp: , Rfl:     pantoprazole (PROTONIX) 40 MG tablet, TAKE (1) TABLET DAILY, Disp: 30 tablet, Rfl: 5    aspirin 81 MG tablet, Take 81 mg by mouth daily, Disp: , Rfl:     polyethylene glycol (GLYCOLAX) powder, Take 17 g by mouth daily, Disp: , Rfl:     acetaminophen (TYLENOL) 325 MG tablet, Take 2 tablets by mouth every 4 hours as needed for Fever (Fever >100.5 (38 C), pain), Disp: 120 tablet, Rfl: 3    potassium chloride (KLOR-CON M) 20 MEQ extended release tablet, Take 1 tablet by mouth daily (with breakfast), Disp: 60 tablet, Rfl: 3    lactulose (CHRONULAC) 10 longer appreciated. Stable region of scarring and/or atelectasis at the posteromedial aspect of the left lower lobe. CT chest 10/19/2020 imaging reviewed by me and showed  Stable CT chest  Stable pulmonary nodules  Underlying emphysema  Post wedge resection on the left    Assessment:      · Severe COPD   · ROLLY squamous cell lung cancer. Resection 1/4501 and complicated by PNA, pleural effusion and SQ emphysema. No recurrence on repeat CT 10/19/2020. · LLL near diaphragm pulmonary nodule stable since 9/2009  · Subsegmental atelectasis in the posteromedial segment of the left lower lobe with severe narrowing of its feeding bronchus. Likely mucus impaction. Patient is still refusing bronchoscopy. Identical on repeated CT 10/29/2013 to CT from 2010. · New 7 mm ROLLY nodule on CT chest 10/31/2016. Resolved on CT chest 1/9/2017  · New RLL 9 mm nodule on CT 7/11/2019. Resolved on repeat CT chest 10/11/2019   · 60 pack year smoking- quit smoking 2006  · CVA with residual L sided weakness uses cane/walker and wheelchair   · NH resident since Sep 2020 bedridden from CVA       Plan:      Continue Breo Ellipta and DuoNeb Q 4 hrs PRN   Advised to titrate O2 using her pulse oximeter- target O2 sat 90-92%. Patient is up to date with Pneumococcal vaccine and influenza vaccines   Acapella and Mucinex   O2 2LPM at night   Follow up in 6 months           Mardeen Litten is a 68 y.o. male being evaluated by a Virtual Visit (video visit) encounter to address concerns as mentioned above. A caregiver was present when appropriate. Due to this being a TeleHealth encounter (During VHQYY-87 public health emergency), evaluation of the following organ systems was limited: Vitals/Constitutional/EENT/Resp/CV/GI//MS/Neuro/Skin/Heme-Lymph-Imm.   Pursuant to the emergency declaration under the [facility], [address] [place] [address] [facility] authority and the [facility], this Virtual Visit was conducted with patient's (and/or legal guardian's) consent, to reduce the patient's risk of exposure to COVID-19 and provide necessary medical care. The patient (and/or legal guardian) has also been advised to contact this office for worsening conditions or problems, and seek emergency medical treatment and/or call [phone] if deemed necessary. Services were provided through a video synchronous discussion virtually to substitute for in-person clinic visit. Patient was located in her home, provider was located in his office. --Layo Quiles MD on 12/9/2020 at 8:59 AM    An electronic signature was used to authenticate this note.

## 2021-01-01 ENCOUNTER — TELEPHONE (OUTPATIENT)
Dept: PULMONOLOGY | Age: 81
End: 2021-01-01

## 2021-01-01 ENCOUNTER — TELEPHONE (OUTPATIENT)
Dept: FAMILY MEDICINE CLINIC | Age: 81
End: 2021-01-01

## 2021-11-08 NOTE — TELEPHONE ENCOUNTER
tablet TAKE (1) TABLET DAILY 30 tablet 3    Nebulizers (COMPRESSOR/NEBULIZER) MISC Nebulizer as directed. Diagnosis: Chronic COPD with acute exacerbation 1 each 3    ipratropium-albuterol (DUONEB) 0.5-2.5 (3) MG/3ML SOLN nebulizer solution Inhale 3 mLs into the lungs every 6 hours as needed for Shortness of Breath 360 mL 1    albuterol sulfate  (90 Base) MCG/ACT inhaler INHALE 2 PUFFS EVERY 6 HOURS AS NEEDED 18 g 5    tamsulosin (FLOMAX) 0.4 MG capsule TAKE 2 CAPSULES AT BEDTIME 60 capsule 0    fluticasone-vilanterol (BREO ELLIPTA) 100-25 MCG/INH AEPB inhaler INHALE 1 PUFF DAILY 1 each 5    escitalopram (LEXAPRO) 10 MG tablet TAKE (1) TABLET DAILY 30 tablet 5    finasteride (PROSCAR) 5 MG tablet TAKE (1) TABLET DAILY 30 tablet 5    fluticasone (FLONASE) 50 MCG/ACT nasal spray USE 2 SPRAYS INTO EACH NOSTRIL DAILY 16 g 5    traZODone (DESYREL) 50 MG tablet Take 1 tablet by mouth nightly 30 tablet 5    Multiple Vitamins-Minerals (THERAPEUTIC MULTIVITAMIN-MINERALS) tablet Take 1 tablet by mouth daily      pantoprazole (PROTONIX) 40 MG tablet TAKE (1) TABLET DAILY 30 tablet 5    aspirin 81 MG tablet Take 81 mg by mouth daily      polyethylene glycol (GLYCOLAX) powder Take 17 g by mouth daily      acetaminophen (TYLENOL) 325 MG tablet Take 2 tablets by mouth every 4 hours as needed for Fever (Fever >100.5 (38 C), pain) 120 tablet 3    potassium chloride (KLOR-CON M) 20 MEQ extended release tablet Take 1 tablet by mouth daily (with breakfast) 60 tablet 3    lactulose (CHRONULAC) 10 GM/15ML solution Take 15 mLs by mouth every evening 240 mL 0     No current facility-administered medications for this visit.    Review of systems: Positive for some confusion and chronic shortness of breath and recurrent wheezing    Physical Examination: General appearance - chronically ill appearing  Mental status - affect appropriate to mood  Neck - supple, no significant adenopathy  Chest - clear to auscultation, no wheezes, rales or rhonchi, symmetric air entry  Heart - normal rate, regular rhythm, normal S1, S2, no murmurs, rubs, clicks or gallops  Abdomen - soft, nontender, nondistended, no masses or organomegaly  Extremities - no pedal edema noted    Labs: 10/28/2021 potassium low at 3.4 Cr 0.6 CBC hgb 12.3 TSH 0.947    Assessment:  Paroxysmal atrial fibrillation  BPH  COPD  History of CVA  History of lung cancer  History of depression  History of vertebral compression fracture  Recent hypokalemia treated by Dr. Talat Boyd with potassium supplement   Mild Anemia    Plan: We will continue current ordered medications and plan.   We will see on a monthly basis  Recheck potassium level and CBC      Michael Reeves MD

## 2021-11-19 NOTE — TELEPHONE ENCOUNTER
Gina Lopez   Patient: Purcell Meigs  : 1940  Date: 2021  [address] APRN, ROSA    Chief Complaint/HPI:    Nursing staff requested visit today due to patient intermittent shortness of breath and congestion. Patient reports mild increased shortness of breath and congestion over the past couple of days. Patient denies fever, chills, purulent sputum, difficulty breathing or any other associated upper respiratory symptoms. Patient with a personal history of COPD, lung cancer, A. fib, hypertension, hyperlipidemia, history of CVA, BPH, reactive depression and vertebral compression fractures. No Known Allergies     Current Outpatient Medications   Medication Sig Dispense Refill    predniSONE (DELTASONE) 10 MG tablet Take 10 mg by mouth daily      cyclobenzaprine (FLEXERIL) 10 MG tablet Take 10 mg by mouth 3 times daily as needed for Muscle spasms      predniSONE (DELTASONE) 10 MG tablet 3 tabs per day for 3 days, then 2 tabs per day for 3 days, then 1 tab per day for 3 days (Patient not taking: Reported on 2020) 18 tablet 0    amLODIPine (NORVASC) 5 MG tablet Take 1 tablet by mouth daily 30 tablet 3    atorvastatin (LIPITOR) 20 MG tablet TAKE (1) TABLET DAILY 30 tablet 3    metoprolol tartrate (LOPRESSOR) 25 MG tablet TAKE 1 TABLET TWICE DAILY 60 tablet 3    hydroCHLOROthiazide (HYDRODIURIL) 25 MG tablet TAKE (1) TABLET DAILY 30 tablet 3    Nebulizers (COMPRESSOR/NEBULIZER) MISC Nebulizer as directed.  Diagnosis: Chronic COPD with acute exacerbation 1 each 3    ipratropium-albuterol (DUONEB) 0.5-2.5 (3) MG/3ML SOLN nebulizer solution Inhale 3 mLs into the lungs every 6 hours as needed for Shortness of Breath 360 mL 1    albuterol sulfate  (90 Base) MCG/ACT inhaler INHALE 2 PUFFS EVERY 6 HOURS AS NEEDED 18 g 5    tamsulosin (FLOMAX) 0.4 MG capsule TAKE 2 CAPSULES AT BEDTIME 60 capsule 0    fluticasone-vilanterol (BREO ELLIPTA) 100-25 MCG/INH AEPB inhaler INHALE 1 PUFF DAILY 1 each 5    escitalopram (LEXAPRO) 10 MG tablet TAKE (1) TABLET DAILY 30 tablet 5    finasteride (PROSCAR) 5 MG tablet TAKE (1) TABLET DAILY 30 tablet 5    fluticasone (FLONASE) 50 MCG/ACT nasal spray USE 2 SPRAYS INTO EACH NOSTRIL DAILY 16 g 5    traZODone (DESYREL) 50 MG tablet Take 1 tablet by mouth nightly 30 tablet 5    Multiple Vitamins-Minerals (THERAPEUTIC MULTIVITAMIN-MINERALS) tablet Take 1 tablet by mouth daily      pantoprazole (PROTONIX) 40 MG tablet TAKE (1) TABLET DAILY 30 tablet 5    aspirin 81 MG tablet Take 81 mg by mouth daily      polyethylene glycol (GLYCOLAX) powder Take 17 g by mouth daily      acetaminophen (TYLENOL) 325 MG tablet Take 2 tablets by mouth every 4 hours as needed for Fever (Fever >100.5 (38 C), pain) 120 tablet 3    potassium chloride (KLOR-CON M) 20 MEQ extended release tablet Take 1 tablet by mouth daily (with breakfast) 60 tablet 3    lactulose (CHRONULAC) 10 GM/15ML solution Take 15 mLs by mouth every evening 240 mL 0     No current facility-administered medications for this visit. ROS:  General: negative for chills, fever or night sweats  Psychological: Negative for anxiety, depression or sleep disturbances  HEENT: Negative for headache, ear pain, ear drainage, nasal congestion, sore throat  Respiratory: negative for cough. Positive for chronic shortness of breath and intermittent wheezing that has slightly worsened over the past couple of days. No hemoptysis. Cardiovascular: Negative for chest pain or palpitations  Gastrointestinal: Denies nausea, vomiting, changes in bowel pattern, melena, blood in stool, appetite changes   Genitourinary: Negative for dysuria or hematuria  Integumentary: Negative for any rashes, wounds  Musculoskeletal: negative for joint erythema/warmth  Neurological: Negative for changes in speech or swallowing                   Physical Exam:  Constitutional: Oriented to person, place and time.   Patient is cooperative in no distress noted. HEENT: Head is normocephalic and atraumatic, conjunctiva clear, normal extraocular eye movements, no ear discharge, no nasal discharge, neck is supple, no thyromegaly or lymphadenopathy  Respiratory: Breath sounds are slightly diminished throughout with a few rales in bilat bases. chest rises symmetrically. Cardiovascular: Normal rate, regular rhythm and distal pulses intact. Trace of non-pitting edema of bilat feet/ankles with no recent change. Gastrointestinal: Abdomen is nontender without masses, bowel sounds are positive in all 4 quadrants  Genitourinary: Not examined  Integumentary: Skin intact, no rashes  Musculoskeletal: no deformity or tenderness noted on palpation, no warmth or erythema  Neurological: Alert, oriented and cooperative. No tremors noted. Vital Signs:   Temp- 98.2  Pulse- 51  Respiration- 18  B/P- 121/52  Pulse ox- 95%    Pertinent diagnostic tests: none    Assessment/Plan:    COPD:  Recommend albuterol breathing treatments every 6 hours routinely. Advised patient that if symptoms do not improve or worsen we may need to consider antibiotics and/or oral corticosteroids. Patient reports in the past they have done well with routine breathing treatments. Reviewed total plan of care and discuss plan of care with nursing staff. All other medical conditions are stable unless otherwise noted above.

## 2022-01-01 ENCOUNTER — TELEPHONE (OUTPATIENT)
Dept: FAMILY MEDICINE CLINIC | Age: 82
End: 2022-01-01

## 2022-01-22 NOTE — TELEPHONE ENCOUNTER
Avila Duggan   Patient: Trung Monique  : 1940  Date: 2022  Danni PRUITT CNP    Chief Complaint/HPI:    Nursing home visit completed today per PCP and nursing request.  Patient is currently under the care of Hospice. Patient with complaints upset stomach today. He reports he did have a large bowel movement this am which did improve his symptoms. Patient also has chronic shortness of breath however denies any recent worsening of symptoms. Patient medical history of COPD, history of lung cancer, atrial fib, hypokalemia, hypertension, hyperlipidemia, constipation, BPH, reactive depression, vertebral compression fractures and mild anemia. No Known Allergies     ROS:  General: no chills, fever. Fatigue. Psychological: reactive depression related to declining health  HEENT: Negative for headache, nasal congestion, sore throat  Respiratory: chronic shortness of breath. No wheezing or hemoptysis  Cardiovascular: Negative for chest pain or peripheral edema  Gastrointestinal: nausea however no vomiting or changes in bowel pattern  Genitourinary: Negative for dysuria or hematuria  Integumentary: Negative for rashes  Musculoskeletal: generalized weakness  Neurological: no changes in speech or swallowing                   Physical Exam:  Constitutional: Oriented to person, place and time. Patient is cooperative in no distress noted. HEENT: Head is normocephalic and atraumatic, conjunctiva clear, normal extraocular eye movements, no nasal discharge,  neck is supple  Respiratory: trachea midline, breath sounds are slightly diminished however no adventitious breath sounds heard on exam  Cardiovascular: distant sounds. No peripheral edema. Gastrointestinal: Abdomen is nontender without masses, bowel sounds are positive in all 4 quadrants  Genitourinary: Not examined  Integumentary:  no rashes  Musculoskeletal: Generalized weakness  Neurological: Alert, oriented and cooperative.   No tremors noted.      Vital Signs:   Temp- 98.0  Pulse- 59  Respiration- 18  B/P- 123/75  Pulse ox- 98% on oxygen 2L/NC    Assessment/Plan:    COPD with history of lung cancer: Stable. Continue Breo inhaler, oral prednisone, Ipratropium/albuterol HHN, and oxygen. Continue Pallative care. HTN; well controlled with current treatment. Cont. Hydrochlorathiazide and metoprolol. A. Fib: stable. No s/s of bleeding. Continue Eliquis. BPH: Stable. continue finasteride and tamsulosin. Nausea: added zofran 4 mg TID prn for nausea. Continue omeprazole as ordered. Call if symptoms are not improved. Reviewed total plan of care and discuss plan of care with nursing staff. All other medical conditions are stable unless otherwise noted above.

## 2022-02-07 ENCOUNTER — CARE COORDINATION (OUTPATIENT)
Dept: CASE MANAGEMENT | Age: 82
End: 2022-02-07

## 2022-02-07 NOTE — CARE COORDINATION
[facility] Transitions Follow Up Call    2022    Patient: Ashley Rosas  Patient : 1940   MRN: [id number]  Reason for Admission:   Discharge Date: 20 RARS: No data recorded       [facility] and was told that pt passed on 22        Care Transitions Subsequent and Final Call    Subsequent and Final Calls  Care Transitions Interventions  Other Interventions: Follow Up  No future appointments.     NAYA PatrickN, RN   [address] [place] [address] Transition Nurse  [phone]